# Patient Record
Sex: MALE | Race: BLACK OR AFRICAN AMERICAN | NOT HISPANIC OR LATINO | Employment: OTHER | URBAN - METROPOLITAN AREA
[De-identification: names, ages, dates, MRNs, and addresses within clinical notes are randomized per-mention and may not be internally consistent; named-entity substitution may affect disease eponyms.]

---

## 2019-07-31 ENCOUNTER — APPOINTMENT (INPATIENT)
Dept: RADIOLOGY | Facility: HOSPITAL | Age: 64
DRG: 064 | End: 2019-07-31
Payer: COMMERCIAL

## 2019-07-31 ENCOUNTER — APPOINTMENT (EMERGENCY)
Dept: RADIOLOGY | Facility: HOSPITAL | Age: 64
DRG: 064 | End: 2019-07-31
Payer: COMMERCIAL

## 2019-07-31 ENCOUNTER — HOSPITAL ENCOUNTER (INPATIENT)
Facility: HOSPITAL | Age: 64
LOS: 6 days | DRG: 064 | End: 2019-08-06
Attending: EMERGENCY MEDICINE | Admitting: ANESTHESIOLOGY
Payer: COMMERCIAL

## 2019-07-31 DIAGNOSIS — R77.8 ELEVATED TROPONIN: ICD-10-CM

## 2019-07-31 DIAGNOSIS — I16.1 HYPERTENSIVE EMERGENCY: ICD-10-CM

## 2019-07-31 DIAGNOSIS — N39.0 UTI (URINARY TRACT INFECTION): ICD-10-CM

## 2019-07-31 DIAGNOSIS — K59.00 CONSTIPATED: ICD-10-CM

## 2019-07-31 DIAGNOSIS — I63.81 LACUNAR INFARCT, ACUTE (HCC): ICD-10-CM

## 2019-07-31 DIAGNOSIS — I10 HYPERTENSION: ICD-10-CM

## 2019-07-31 DIAGNOSIS — N17.9 AKI (ACUTE KIDNEY INJURY) (HCC): ICD-10-CM

## 2019-07-31 DIAGNOSIS — K21.9 GERD (GASTROESOPHAGEAL REFLUX DISEASE): ICD-10-CM

## 2019-07-31 DIAGNOSIS — N18.9 CKD (CHRONIC KIDNEY DISEASE): ICD-10-CM

## 2019-07-31 DIAGNOSIS — K56.7 ILEUS (HCC): ICD-10-CM

## 2019-07-31 DIAGNOSIS — Z78.9 DEEP VEIN THROMBOSIS (DVT) PROPHYLAXIS PRESCRIBED AT DISCHARGE: ICD-10-CM

## 2019-07-31 DIAGNOSIS — R33.9 URINARY RETENTION: ICD-10-CM

## 2019-07-31 DIAGNOSIS — R52 PAIN: ICD-10-CM

## 2019-07-31 DIAGNOSIS — I63.9 CVA (CEREBRAL VASCULAR ACCIDENT) (HCC): Primary | ICD-10-CM

## 2019-07-31 PROBLEM — E78.5 HLD (HYPERLIPIDEMIA): Status: ACTIVE | Noted: 2019-07-31

## 2019-07-31 LAB
ABO GROUP BLD: NORMAL
ALBUMIN SERPL BCP-MCNC: 3.9 G/DL (ref 3.5–5)
ALP SERPL-CCNC: 79 U/L (ref 46–116)
ALT SERPL W P-5'-P-CCNC: 50 U/L (ref 12–78)
ANION GAP SERPL CALCULATED.3IONS-SCNC: 11 MMOL/L (ref 4–13)
APTT PPP: 29 SECONDS (ref 24–33)
AST SERPL W P-5'-P-CCNC: 29 U/L (ref 5–45)
BILIRUB DIRECT SERPL-MCNC: 0.2 MG/DL (ref 0–0.2)
BILIRUB SERPL-MCNC: 0.8 MG/DL (ref 0.2–1)
BLD GP AB SCN SERPL QL: NEGATIVE
BUN SERPL-MCNC: 13 MG/DL (ref 5–25)
CALCIUM SERPL-MCNC: 8.9 MG/DL (ref 8.3–10.1)
CHLORIDE SERPL-SCNC: 103 MMOL/L (ref 100–108)
CO2 SERPL-SCNC: 26 MMOL/L (ref 21–32)
CREAT SERPL-MCNC: 1.41 MG/DL (ref 0.6–1.3)
ERYTHROCYTE [DISTWIDTH] IN BLOOD BY AUTOMATED COUNT: 13.6 % (ref 11.6–15.1)
GFR SERPL CREATININE-BSD FRML MDRD: 61 ML/MIN/1.73SQ M
GLUCOSE SERPL-MCNC: 91 MG/DL (ref 65–140)
GLUCOSE SERPL-MCNC: 96 MG/DL (ref 65–140)
HCT VFR BLD AUTO: 50.2 % (ref 36.5–49.3)
HGB BLD-MCNC: 16.4 G/DL (ref 12–17)
INR PPP: 1.09 (ref 0.86–1.16)
MCH RBC QN AUTO: 30.8 PG (ref 26.8–34.3)
MCHC RBC AUTO-ENTMCNC: 32.7 G/DL (ref 31.4–37.4)
MCV RBC AUTO: 94 FL (ref 82–98)
PLATELET # BLD AUTO: 221 THOUSANDS/UL (ref 149–390)
PMV BLD AUTO: 12.1 FL (ref 8.9–12.7)
POTASSIUM SERPL-SCNC: 3.7 MMOL/L (ref 3.5–5.3)
PROT SERPL-MCNC: 7.8 G/DL (ref 6.4–8.2)
PROTHROMBIN TIME: 11.4 SECONDS (ref 9.4–11.7)
RBC # BLD AUTO: 5.33 MILLION/UL (ref 3.88–5.62)
RH BLD: POSITIVE
SODIUM SERPL-SCNC: 140 MMOL/L (ref 136–145)
SPECIMEN EXPIRATION DATE: NORMAL
TROPONIN I SERPL-MCNC: 0.3 NG/ML
TROPONIN I SERPL-MCNC: 0.32 NG/ML
WBC # BLD AUTO: 7.16 THOUSAND/UL (ref 4.31–10.16)

## 2019-07-31 PROCEDURE — 71045 X-RAY EXAM CHEST 1 VIEW: CPT

## 2019-07-31 PROCEDURE — 70496 CT ANGIOGRAPHY HEAD: CPT

## 2019-07-31 PROCEDURE — 99285 EMERGENCY DEPT VISIT HI MDM: CPT

## 2019-07-31 PROCEDURE — 96375 TX/PRO/DX INJ NEW DRUG ADDON: CPT

## 2019-07-31 PROCEDURE — 85610 PROTHROMBIN TIME: CPT | Performed by: EMERGENCY MEDICINE

## 2019-07-31 PROCEDURE — 80076 HEPATIC FUNCTION PANEL: CPT | Performed by: EMERGENCY MEDICINE

## 2019-07-31 PROCEDURE — 93005 ELECTROCARDIOGRAM TRACING: CPT

## 2019-07-31 PROCEDURE — 70551 MRI BRAIN STEM W/O DYE: CPT

## 2019-07-31 PROCEDURE — 70498 CT ANGIOGRAPHY NECK: CPT

## 2019-07-31 PROCEDURE — 85730 THROMBOPLASTIN TIME PARTIAL: CPT | Performed by: EMERGENCY MEDICINE

## 2019-07-31 PROCEDURE — 86850 RBC ANTIBODY SCREEN: CPT | Performed by: EMERGENCY MEDICINE

## 2019-07-31 PROCEDURE — 36415 COLL VENOUS BLD VENIPUNCTURE: CPT | Performed by: EMERGENCY MEDICINE

## 2019-07-31 PROCEDURE — 86900 BLOOD TYPING SEROLOGIC ABO: CPT | Performed by: EMERGENCY MEDICINE

## 2019-07-31 PROCEDURE — 84484 ASSAY OF TROPONIN QUANT: CPT | Performed by: EMERGENCY MEDICINE

## 2019-07-31 PROCEDURE — 99291 CRITICAL CARE FIRST HOUR: CPT | Performed by: ANESTHESIOLOGY

## 2019-07-31 PROCEDURE — 80048 BASIC METABOLIC PNL TOTAL CA: CPT | Performed by: EMERGENCY MEDICINE

## 2019-07-31 PROCEDURE — 85027 COMPLETE CBC AUTOMATED: CPT | Performed by: EMERGENCY MEDICINE

## 2019-07-31 PROCEDURE — 82948 REAGENT STRIP/BLOOD GLUCOSE: CPT

## 2019-07-31 PROCEDURE — 86901 BLOOD TYPING SEROLOGIC RH(D): CPT | Performed by: EMERGENCY MEDICINE

## 2019-07-31 PROCEDURE — 87081 CULTURE SCREEN ONLY: CPT | Performed by: FAMILY MEDICINE

## 2019-07-31 PROCEDURE — 96374 THER/PROPH/DIAG INJ IV PUSH: CPT

## 2019-07-31 RX ORDER — NEBIVOLOL 20 MG/1
20 TABLET ORAL DAILY
COMMUNITY

## 2019-07-31 RX ORDER — ASPIRIN 325 MG
325 TABLET ORAL ONCE
Status: COMPLETED | OUTPATIENT
Start: 2019-07-31 | End: 2019-07-31

## 2019-07-31 RX ORDER — ASPIRIN 81 MG/1
81 TABLET ORAL DAILY
COMMUNITY

## 2019-07-31 RX ORDER — ATORVASTATIN CALCIUM 80 MG/1
80 TABLET, FILM COATED ORAL
Status: DISCONTINUED | OUTPATIENT
Start: 2019-07-31 | End: 2019-08-06 | Stop reason: HOSPADM

## 2019-07-31 RX ORDER — ONDANSETRON 2 MG/ML
4 INJECTION INTRAMUSCULAR; INTRAVENOUS ONCE
Status: COMPLETED | OUTPATIENT
Start: 2019-07-31 | End: 2019-07-31

## 2019-07-31 RX ORDER — HYDRALAZINE HYDROCHLORIDE 20 MG/ML
10 INJECTION INTRAMUSCULAR; INTRAVENOUS ONCE
Status: COMPLETED | OUTPATIENT
Start: 2019-07-31 | End: 2019-07-31

## 2019-07-31 RX ORDER — SIMVASTATIN 10 MG
10 TABLET ORAL
COMMUNITY
End: 2019-08-06 | Stop reason: HOSPADM

## 2019-07-31 RX ORDER — MULTIVITAMIN
1 TABLET ORAL DAILY
COMMUNITY

## 2019-07-31 RX ORDER — AMLODIPINE AND OLMESARTAN MEDOXOMIL 10; 40 MG/1; MG/1
1 TABLET ORAL DAILY
COMMUNITY
End: 2019-08-06 | Stop reason: HOSPADM

## 2019-07-31 RX ORDER — LABETALOL 20 MG/4 ML (5 MG/ML) INTRAVENOUS SYRINGE
10 ONCE
Status: COMPLETED | OUTPATIENT
Start: 2019-07-31 | End: 2019-07-31

## 2019-07-31 RX ORDER — ASPIRIN 81 MG/1
81 TABLET ORAL DAILY
Status: DISCONTINUED | OUTPATIENT
Start: 2019-08-01 | End: 2019-08-06 | Stop reason: HOSPADM

## 2019-07-31 RX ORDER — SPIRONOLACTONE 25 MG/1
TABLET ORAL DAILY
COMMUNITY
End: 2019-08-06 | Stop reason: HOSPADM

## 2019-07-31 RX ORDER — NEBIVOLOL 10 MG/1
20 TABLET ORAL
Status: DISCONTINUED | OUTPATIENT
Start: 2019-07-31 | End: 2019-08-06 | Stop reason: HOSPADM

## 2019-07-31 RX ORDER — LOSARTAN POTASSIUM 50 MG/1
100 TABLET ORAL
Status: DISCONTINUED | OUTPATIENT
Start: 2019-07-31 | End: 2019-08-03

## 2019-07-31 RX ORDER — AMLODIPINE BESYLATE 10 MG/1
10 TABLET ORAL
Status: DISCONTINUED | OUTPATIENT
Start: 2019-07-31 | End: 2019-08-03

## 2019-07-31 RX ADMIN — ATORVASTATIN CALCIUM 80 MG: 80 TABLET ORAL at 18:59

## 2019-07-31 RX ADMIN — NEBIVOLOL HYDROCHLORIDE 20 MG: 10 TABLET ORAL at 21:24

## 2019-07-31 RX ADMIN — LABETALOL 20 MG/4 ML (5 MG/ML) INTRAVENOUS SYRINGE 10 MG: at 14:03

## 2019-07-31 RX ADMIN — AMLODIPINE BESYLATE 10 MG: 10 TABLET ORAL at 21:23

## 2019-07-31 RX ADMIN — SODIUM CHLORIDE 5 MG/HR: 0.9 INJECTION, SOLUTION INTRAVENOUS at 18:27

## 2019-07-31 RX ADMIN — LOSARTAN POTASSIUM 100 MG: 50 TABLET ORAL at 21:24

## 2019-07-31 RX ADMIN — IOHEXOL 85 ML: 350 INJECTION, SOLUTION INTRAVENOUS at 13:33

## 2019-07-31 RX ADMIN — ONDANSETRON 4 MG: 2 INJECTION INTRAMUSCULAR; INTRAVENOUS at 18:03

## 2019-07-31 RX ADMIN — ASPIRIN 325 MG: 325 TABLET, FILM COATED ORAL at 14:44

## 2019-07-31 RX ADMIN — HYDRALAZINE HYDROCHLORIDE 10 MG: 20 INJECTION INTRAMUSCULAR; INTRAVENOUS at 14:40

## 2019-07-31 RX ADMIN — HYDRALAZINE HYDROCHLORIDE 10 MG: 20 INJECTION INTRAMUSCULAR; INTRAVENOUS at 15:06

## 2019-07-31 NOTE — ASSESSMENT & PLAN NOTE
· No complaints of chest pain, EKG with LVH   Will need further work-up likely prior to discharge  · Echocardiogram ordered  · Cardiology consulted

## 2019-07-31 NOTE — QUICK NOTE
Stroke alert called: 116 PM  Neurology called back: 117 pm  Patient's last known normal: 5 pm yesterday refused to come to ED per family and then came today due to worsening symptoms  NIHSS per ED exam : at least a 5 with left UE And LE paresis, dysmetria    CT head not acute with remote right BG infarct noted  CTA H/N not acute- no LVO  IVtPA no- out of time window    Admit under stroke care protocol  Permissive HTN upto 180s /100-120s for 24 hours then gradually lower to 120-140 SBP   now PO or AR if fails swallow screen  MRI Brain routine- concern for acute ischemic brainstem infarct vs other subcortical ischemic infarction with vascular risk factors including untreated HTN  Lipitor 80 daily at least for a month then 40  ECHO

## 2019-07-31 NOTE — ED NOTES
Patient was just vomiting in room  Vomit yellow in color, thin consistency  Patient states feels better after vomiting        202 Stephanie Andino, RN  07/31/19 1800

## 2019-07-31 NOTE — ED PROVIDER NOTES
History  Chief Complaint   Patient presents with    Extremity Weakness     right sided weakness since last night at 160     43-year-old male with past medical history of hypertension, hyperlipidemia, presents to the ER with complaint of right upper arm and leg weakness that started around 5:00 p m  Last night  Patient refused to come to the ED last night  Weakness persistent patient is now having difficulty walking as a result  Patient came to the ER for further evaluation  Upon arrival to the ER stroke alert was activated as symptom onset is less than 24 hours  History provided by:  Patient and spouse      Prior to Admission Medications   Prescriptions Last Dose Informant Patient Reported? Taking? Multiple Vitamin (MULTIVITAMIN) tablet 7/30/2019 at Unknown time  Yes Yes   Sig: Take 1 tablet by mouth daily   amlodipine-olmesartan (TED) 10-40 MG 7/30/2019 at Unknown time  Yes Yes   Sig: Take 1 tablet by mouth daily   aspirin (ECOTRIN LOW STRENGTH) 81 mg EC tablet 7/30/2019 at Unknown time  Yes Yes   Sig: Take 81 mg by mouth daily   nebivolol (BYSTOLIC) 20 MG tablet 9/01/0985 at Unknown time  Yes Yes   Sig: Take 20 mg by mouth daily   simvastatin (ZOCOR) 10 mg tablet 7/30/2019 at Unknown time  Yes Yes   Sig: Take 10 mg by mouth daily at bedtime   spironolactone (ALDACTONE) 25 mg tablet 7/30/2019 at Unknown time  Yes Yes   Sig: Take by mouth daily      Facility-Administered Medications: None       Past Medical History:   Diagnosis Date    Hyperlipidemia     Hypertension        History reviewed  No pertinent surgical history  History reviewed  No pertinent family history  I have reviewed and agree with the history as documented      Social History     Tobacco Use    Smoking status: Never Smoker    Smokeless tobacco: Never Used   Substance Use Topics    Alcohol use: Never     Frequency: Never    Drug use: Never        Review of Systems   Constitutional: Negative for activity change, fatigue and fever    HENT: Negative for congestion, ear discharge and sore throat  Eyes: Negative for pain and redness  Respiratory: Negative for cough, chest tightness, shortness of breath and wheezing  Cardiovascular: Negative for chest pain  Gastrointestinal: Negative for abdominal pain, diarrhea, nausea and vomiting  Endocrine: Negative for cold intolerance  Genitourinary: Negative for dysuria and urgency  Musculoskeletal: Negative for arthralgias and back pain  Neurological: Positive for weakness  Negative for dizziness and headaches  Psychiatric/Behavioral: Negative for agitation and behavioral problems  Physical Exam  Physical Exam   Constitutional: He is oriented to person, place, and time  He appears well-developed and well-nourished  HENT:   Head: Normocephalic and atraumatic  Nose: Nose normal    Mouth/Throat: Oropharynx is clear and moist    Eyes: Conjunctivae and EOM are normal    Neck: Normal range of motion  Neck supple  Cardiovascular: Normal rate, regular rhythm and normal heart sounds  Pulmonary/Chest: Effort normal and breath sounds normal    Abdominal: Soft  Bowel sounds are normal  He exhibits no distension  There is no tenderness  Musculoskeletal: Normal range of motion  Neurological: He is alert and oriented to person, place, and time  Patient is alert and oriented x3  Visual field intact bilateral   Finger-to-nose is weak on the right side  Heel-to-shin is weak on the right side  Sensory and motor strength intact bilateral   No focal neuro deficits noted  Skin: Skin is warm  Psychiatric: He has a normal mood and affect  His behavior is normal  Judgment and thought content normal    Nursing note and vitals reviewed        Vital Signs  ED Triage Vitals   Temperature Pulse Respirations Blood Pressure SpO2   07/31/19 1312 07/31/19 1312 07/31/19 1312 07/31/19 1312 07/31/19 1312   (!) 97 °F (36 1 °C) 88 20 (!) 260/127 98 %      Temp Source Heart Rate Source Patient Position - Orthostatic VS BP Location FiO2 (%)   07/31/19 1451 07/31/19 1327 07/31/19 1327 07/31/19 1451 --   Temporal Monitor Lying Left arm       Pain Score       07/31/19 1312       No Pain           Vitals:    07/31/19 1515 07/31/19 1545 07/31/19 1600 07/31/19 1615   BP: (!) 181/99 (!) 211/105 (!) 194/102 (!) 222/110   Pulse: 68 66 64 66   Patient Position - Orthostatic VS: Sitting  Lying Lying         Visual Acuity  Visual Acuity      Most Recent Value   L Pupil Size (mm)  3   R Pupil Size (mm)  3          ED Medications  Medications   niCARdipine (CARDENE) 25 mg (STANDARD CONCENTRATION) in sodium chloride 0 9% 250 mL (has no administration in time range)   iohexol (OMNIPAQUE) 350 MG/ML injection (MULTI-DOSE) 85 mL (85 mL Intravenous Given 7/31/19 1333)   Labetalol HCl (NORMODYNE) injection 10 mg (10 mg Intravenous Given 7/31/19 1403)   aspirin tablet 325 mg (325 mg Oral Given 7/31/19 1444)   hydrALAZINE (APRESOLINE) injection 10 mg (10 mg Intravenous Given 7/31/19 1440)   hydrALAZINE (APRESOLINE) injection 10 mg (10 mg Intravenous Given 7/31/19 1506)       Diagnostic Studies  Results Reviewed     Procedure Component Value Units Date/Time    Troponin I [[id number]]  (Abnormal) Collected:  07/31/19 1557    Lab Status:  Final result Specimen:  Blood from Arm, Right Updated:  07/31/19 1624     Troponin I 0 30 ng/mL     Troponin I [[id number]]  (Abnormal) Collected:  07/31/19 1324    Lab Status:  Final result Specimen:  Blood from Arm, Right Updated:  07/31/19 1355     Troponin I 0 32 ng/mL     Basic metabolic panel [[id number]]  (Abnormal) Collected:  07/31/19 1324    Lab Status:  Final result Specimen:  Blood from Arm, Right Updated:  07/31/19 1354     Sodium 140 mmol/L      Potassium 3 7 mmol/L      Chloride 103 mmol/L      CO2 26 mmol/L      ANION GAP 11 mmol/L      BUN 13 mg/dL      Creatinine 1 41 mg/dL      Glucose 96 mg/dL      Calcium 8 9 mg/dL      eGFR 61 ml/min/1 73sq m     Narrative:       National Kid[employer] Disease Foundation guidelines for Chronic Kidney Disease (CKD):     Stage 1 with normal or high GFR (GFR > 90 mL/min/1 73 square meters)    Stage 2 Mild CKD (GFR = 60-89 mL/min/1 73 square meters)    Stage 3A Moderate CKD (GFR = 45-59 mL/min/1 73 square meters)    Stage 3B Moderate CKD (GFR = 30-44 mL/min/1 73 square meters)    Stage 4 Severe CKD (GFR = 15-29 mL/min/1 73 square meters)    Stage 5 End Stage CKD (GFR <15 mL/min/1 73 square meters)  Note: GFR calculation is accurate only with a steady state creatinine    Hepatic function panel [[id number]]  (Normal) Collected:  07/31/19 1324    Lab Status:  Final result Specimen:  Blood from Arm, Right Updated:  07/31/19 1354     Total Bilirubin 0 80 mg/dL      Bilirubin, Direct 0 20 mg/dL      Alkaline Phosphatase 79 U/L      AST 29 U/L      ALT 50 U/L      Total Protein 7 8 g/dL      Albumin 3 9 g/dL     Protime-INR [[id number]]  (Normal) Collected:  07/31/19 1324    Lab Status:  Final result Specimen:  Blood from Arm, Right Updated:  07/31/19 1346     Protime 11 4 seconds      INR 1 09    APTT [[id number]]  (Normal) Collected:  07/31/19 1324    Lab Status:  Final result Specimen:  Blood from Arm, Right Updated:  07/31/19 1346     PTT 29 seconds     CBC and Platelet [[id number]]  (Abnormal) Collected:  07/31/19 1324    Lab Status:  Final result Specimen:  Blood from Arm, Right Updated:  07/31/19 1335     WBC 7 16 Thousand/uL      RBC 5 33 Million/uL      Hemoglobin 16 4 g/dL      Hematocrit 50 2 %      MCV 94 fL      MCH 30 8 pg      MCHC 32 7 g/dL      RDW 13 6 %      Platelets 960 Thousands/uL      MPV 12 1 fL     Fingerstick Glucose (POCT) [[id number]]  (Normal) Collected:  07/31/19 1314    Lab Status:  Final result Updated:  07/31/19 1317     POC Glucose 91 mg/dl                  X-ray chest 1 view portable   Final Result by Osiris Wang MD (07/31 1557)      No acute cardiopulmonary disease              Workstation performed: [id number]         CTA stroke alert (head/neck)   Final Result by Sergio Torres MD (07/31 8830)         1  No hemodynamically significant stenosis in the major arteries of the neck  2   No intracranial aneurysm or major intracranial arterial stenosis  3   Frothy soft tissue abnormality in the nondependent portion of the trachea with imaging features favoring aspiration  Differential diagnosis could include tracheal polyp or other soft tissue lesion  4   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  Findings were directly discussed with Dr Honey Weinberg on 7/31/2019 1:32 PM                      Workstation performed: [id number]         CT stroke alert brain   Final Result by Sergio Torres MD (07/31 0586)      No acute intracranial hemorrhage, mass effect or edema  Microangiopathic changes  Chronic appearing right basal ganglia lacunar infarctions  Findings were directly discussed with Dr Honey Weinberg on 7/31/2019 1:32 PM       Workstation performed: [id number]         MRI brain wo contrast    (Results Pending)              Procedures  ECG 12 Lead Documentation Only  Date/Time: 7/31/2019 1:30 PM  Performed by: Bradley Lozoya DO  Authorized by: Bradley Lozoya DO     Indications / Diagnosis:  Stroke  ECG reviewed by me, the ED Provider: yes    Patient location:  ED  Previous ECG:     Previous ECG:  Unavailable    Comparison to cardiac monitor: Yes    Interpretation:     Interpretation: abnormal    Comments:      Sinus rhythm, rate 58, normal axis, normal intervals, no acute ST elevations noted, left ventricular hypertrophy noted, T-wave inversions noted to V4 to V6 as well as lead 1 and aVL that is concerning for lateral ischemia  No previous EKG available for comparison    ECG 12 Lead Documentation Only  Date/Time: 7/31/2019 3:50 PM  Performed by: Bradley Lozoya DO  Authorized by: Bradley Lozoya DO     Indications / Diagnosis:  Weakness  ECG reviewed by me, the ED Provider: yes Patient location:  ED  Previous ECG:     Previous ECG:  Compared to current    Similarity:  No change    Comparison to cardiac monitor: Yes    Interpretation:     Interpretation: abnormal    Comments:      Sinus rhythm, rate 63, normal axis, normal intervals, left ventricular hypertrophy, T-wave inversions noted to lead for through lead 6, lead 1, and aVL showing concern for lateral ischemia that is unchanged from earlier EKG  CriticalCare Time  Performed by: Dwayne Blancas DO  Authorized by: Dwayne Blancas DO     Critical care provider statement:     Critical care time (minutes):  125    Critical care was necessary to treat or prevent imminent or life-threatening deterioration of the following conditions:  CNS failure or compromise    Critical care was time spent personally by me on the following activities:  Blood draw for specimens, obtaining history from patient or surrogate, development of treatment plan with patient or surrogate, discussions with consultants, discussions with primary provider, evaluation of patient's response to treatment, examination of patient, review of old charts, re-evaluation of patient's condition, ordering and review of radiographic studies, ordering and review of laboratory studies, ordering and performing treatments and interventions and interpretation of cardiac output measurements  Comments:      Stroke           ED Course  ED Course as of Jul 31 1709   Wed Jul 31, 2019   1317 Case discussed with neurologist on call, Dr Johanny Avelar, who agrees that patient is not a tPA candidate however if there is any large occlusions then patient may be and endovascular candidate  Dr Johanny Avelar will follow up with CT results  0 Neurologist reviewed CT brain as well as CTA which were negative for any acute events  At this time it is recommended to admit patient for further stroke workup with MRI brain  Full-dose aspirin for now        1429 Case discussed with cardiologist,   Jacobo Reinoso, who agrees the patient has elevated troponin may be a component of CVA versus LASHA  Patient does have ischemic changes in his EKG however patient is chest pain-free  At this time will hold off on heparin and trend troponin  Heparin can be started if troponin continues to rise  1433 Case discussed with hospitalist, Dr Vicki Lira, who will admit patient once blood pressure is stabilized  24-20-52-61 Patient re-examined at bedside  Patient complains of worsening right upper and lower extremity weakness  Patient is also diaphoretic however denies any chest pain  Patient's blood pressure is elevated again at 211/105  At this time will obtain 2nd EKG and troponin  Will start Cardene drip  ICU team notify who will, evaluate patient at bedside  [id number] Patient examined by ICU team and at this time he will be accepted to ICU with Cardene drip  80 Discussed patient's progress with neurologist again who agrees with Thai santamaria and recommends MRI brain today to assess stroke burden  Called and spoke to MRI tech who will be able to scan patient today  1705 Case discussed with radiologist who notes acute to subacute lacunar infarct in the left corona radiata  ICU team notified of MRI findings                  Stroke Assessment     Row Name 07/31/19 1350             NIH Stroke Scale    Interval  Baseline      Level of Consciousness (1a )  0      LOC Questions (1b )  0      LOC Commands (1c )  0      Best Gaze (2 )  0      Visual (3 )  0      Facial Palsy (4 )  0      Motor Arm, Left (5a )  0      Motor Arm, Right (5b )  1      Motor Leg, Left (6a )  0      Motor Leg, Right (6b )  0      Limb Ataxia (7 )  2      Sensory (8 )  0      Best Language (9 )  0      Dysarthria (10 )  0      Extinction and Inattention (11 ) (Formerly Neglect)  0      Total  3                          MDM  Number of Diagnoses or Management Options  LASHA (acute kidney injury) [facility]): new and requires workup  CVA (cerebral vascular accident) [facility]): new and requires workup  Elevated troponin: new and requires workup  Hypertension: new and requires workup  Diagnosis management comments: Stroke alert activated, blood work and CT brain, CTA head/neck ordered  Continue to monitor patient closely  Patient is hypertensive in the ED  Give labetalol and monitor BP  Amount and/or Complexity of Data Reviewed  Clinical lab tests: ordered and reviewed  Tests in the radiology section of CPT®: ordered and reviewed  Tests in the medicine section of CPT®: ordered and reviewed  Review and summarize past medical records: yes  Independent visualization of images, tracings, or specimens: yes    Risk of Complications, Morbidity, and/or Mortality  General comments: Patient's CT brain and CTA head/neck did not show any acute intracranial events  At this time patient was admitted for stroke workup with follow-up MRI brain  Patient's blood pressure was persistently elevated despite multiple doses of hydralazine and labetalol  Patient also complained of worsening right upper and lower extremity weakness throughout his ED stay  At this time patient's symptoms may be a component of uncontrolled hypertensive emergency as well as CVA symptoms  Patient was started on Cardene drip for better blood pressure control and evaluated by ICU team   At this time patient's admission was upgraded from telemetry to ICU for further monitoring  Patient went to MRI prior to going to ICU  MRI shows acute to subacute lacunar infarct in the left corona radiata  ICU team notified of these findings      Patient Progress  Patient progress: stable      Disposition  Final diagnoses:   CVA (cerebral vascular accident) ([address] )   Hypertension   LASHA (acute kidney injury) ([facility] [address] )   Elevated troponin     Time reflects when diagnosis was documented in both MDM as applicable and the Disposition within this note     Time User Action Codes Description Comment    7/31/2019  3:30 PM Stuart Richards Add [I63 9] CVA (cerebral vascular accident) ([facility] [address] )     7/31/2019  3:30 PM SoniyaAshleighone Add [I10] Hypertension     7/31/2019  3:30 PM Crista DennisonAshleigh Add [N17 9] LASHA (acute kidney injury) ([facility] [address] )     7/31/2019  3:31 PM Stuart Richards Add [R74 8] Elevated troponin       ED Disposition     ED Disposition Condition Date/Time Comment    Admit Stable Wed Jul 31, 2019  3:31 PM Case was discussed with Dr Sebastien Estrella and the patient's admission status was agreed to be Admission Status: inpatient status to the service of Dr Sebastien Estrella  Follow-up Information    None         Patient's Medications   Discharge Prescriptions    No medications on file     No discharge procedures on file      ED Provider  Electronically Signed by           Sagrario Mcgee, DO  07/31/19 409 [facility], DO  07/31/19 9930

## 2019-07-31 NOTE — H&P
History and Physical - Critical Care/ Stepdown   Silas Williamson 60 y o  male MRN: [id number]  Unit/Bed#: ED 04 Encounter: [id number]    Reason for Admission / Chief Complaint: stroke    History of Present Illness:  Silas Williamson is a 60 y o  male his reported history of hypertension hyperlipidemia who presents to the emergency department with right-sided weakness  Wife states that this began to start around 17:00 yesterday however the patient refused to come to the emergency department  The weakness worsened the patient was unable to ambulate and, prompting the wife to bring him to the emergency department for further evaluation  In the emergency department, given his late presentation, he was not a candidate for tPA  CT of his head did not show any acute bleed  Patient was hypertensive in the emergency department was given labetalol as well as hydralazine with minimal fact  His extremity weakness was reportedly getting worse while in the emergency department depending on his blood pressure  Given this, the patient was started on cardene and will be admitted to ICU  History obtained from chart review and the patient  Past Medical History:  Past Medical History:   Diagnosis Date    Hyperlipidemia     Hypertension        Past Surgical History:  History reviewed  No pertinent surgical history  Past Family History:  History reviewed  No pertinent family history      Social History:  Social History     Tobacco Use   Smoking Status Never Smoker   Smokeless Tobacco Never Used      Social History     Substance and Sexual Activity   Alcohol Use Never    Frequency: Never     Social History     Substance and Sexual Activity   Drug Use Never     Marital Status: /Civil Union  Exercise History:     Medications:  Current Facility-Administered Medications   Medication Dose Route Frequency    [START ON 8/1/2019] aspirin (ECOTRIN LOW STRENGTH) EC tablet 81 mg  81 mg Oral Daily    atorvastatin (LIPITOR) tablet 80 mg  80 mg Oral Daily With Dinner    [START ON 2019] enoxaparin (LOVENOX) subcutaneous injection 40 mg  40 mg Subcutaneous Daily    niCARdipine (CARDENE) 25 mg (STANDARD CONCENTRATION) in sodium chloride 0 9% 250 mL  1-15 mg/hr Intravenous Titrated     Home medications:  Prior to Admission medications    Medication Sig Start Date End Date Taking? Authorizing Provider   amlodipine-olmesartan (TED) 10-40 MG Take 1 tablet by mouth daily   Yes Historical Provider, MD   aspirin (ECOTRIN LOW STRENGTH) 81 mg EC tablet Take 81 mg by mouth daily   Yes Historical Provider, MD   Multiple Vitamin (MULTIVITAMIN) tablet Take 1 tablet by mouth daily   Yes Historical Provider, MD   nebivolol (BYSTOLIC) 20 MG tablet Take 20 mg by mouth daily   Yes Historical Provider, MD   simvastatin (ZOCOR) 10 mg tablet Take 10 mg by mouth daily at bedtime   Yes Historical Provider, MD   spironolactone (ALDACTONE) 25 mg tablet Take by mouth daily   Yes Historical Provider, MD     Allergies:  No Known Allergies    ROS:   Review of Systems   Endocrine: Positive for heat intolerance  Neurological: Positive for speech difficulty and weakness  All other systems reviewed and are negative  Vitals:  Vitals:    19 1645 19 1700 19 1708 19 1712   BP: (!) 176/114 (!) 175/111 (!) 210/97 (!) 172/99   BP Location: Right arm Left arm Left arm Right arm   Pulse: 72 73 72    Resp: 18 18 17    Temp:       TempSrc:       SpO2: 100% 98% 99%    Weight:         Temperature:   Temp (24hrs), Av 5 °F (36 4 °C), Min:97 °F (36 1 °C), Max:98 °F (36 7 °C)    Current Temperature: 98 °F (36 7 °C)    Weights:   IBW: -88 kg  There is no height or weight on file to calculate BMI      Hemodynamic Monitoring:  N/A     Non-Invasive/Invasive Ventilation Settings:  Respiratory    Lab Data (Last 4 hours)    None         O2/Vent Data (Last 4 hours)    None              No results found for: PHART, PSY5OUZ, PO2ART, ZZB9COA, S2UGNLJY, BEART, SOURCE  SpO2: SpO2: 97 %, SpO2 Activity: SpO2 Activity: At Rest, SpO2 Device: O2 Device: None (Room air), Capnography:       Physical Exam:  Physical Exam   Constitutional: He is oriented to person, place, and time  He appears well-developed and well-nourished  No distress  HENT:   Head: Normocephalic and atraumatic  Eyes: Pupils are equal, round, and reactive to light  Neck: Neck supple  Cardiovascular: Normal rate and regular rhythm  No murmur heard  Pulmonary/Chest: Effort normal and breath sounds normal  No respiratory distress  Abdominal: Soft  Bowel sounds are normal  He exhibits no distension  Musculoskeletal: He exhibits no edema  Neurological: He is alert and oriented to person, place, and time  GCS eye subscore is 4  GCS verbal subscore is 5  GCS motor subscore is 6    0/5 strength of right side, 5/5 of left side  Sensation intact  Right sided facial droop  Speech garbled but not confused   Nursing note and vitals reviewed  Labs:  Results from last 7 days   Lab Units 07/31/19  1324   WBC Thousand/uL 7 16   HEMOGLOBIN g/dL 16 4   HEMATOCRIT % 50 2*   PLATELETS Thousands/uL 221      Results from last 7 days   Lab Units 07/31/19  1324   SODIUM mmol/L 140   POTASSIUM mmol/L 3 7   CHLORIDE mmol/L 103   CO2 mmol/L 26   BUN mg/dL 13   CREATININE mg/dL 1 41*   CALCIUM mg/dL 8 9   ALK PHOS U/L 79   ALT U/L 50   AST U/L 29              Results from last 7 days   Lab Units 07/31/19  1324   INR  1 09   PTT seconds 29         0   Lab Value Date/Time    TROPONINI 0 30 (H) 07/31/2019 1557    TROPONINI 0 32 (H) 07/31/2019 1324       Imaging:   CT:  I have personally reviewed pertinent reports  and I have personally reviewed pertinent films in PACS  EKG: This was personally reviewed by myself       Micro:  Blood Culture: No results found for: BLOODCX  Urine Culture: No results found for: URINECX  Sputum Culture: No components found for: SPUTUMCX  Wound Culure: No results found for: WOUNDCULT  ______________________________________________________________________    Assessment:   Principal Problem:    Lacunar infarct, acute ([address] )  Active Problems:    Hypertensive emergency    Elevated troponin    HLD (hyperlipidemia)  Resolved Problems:    * No resolved hospital problems  *      * Lacunar infarct, acute ([address] )  Assessment & Plan  · Patient with delayed presentation to ER after stroke symptoms, not a candidate for tPA at that time  · CT scan without evidence of bleeding  · Permissive HTN to 617 systolic  · Stroke work-up  · Echocardiogram  · Lipids, TSH, HbA1c  · Begin high dose statin  · Neurology consult  · MRI    Hypertensive emergency  Assessment & Plan  · Likely chronically uncontrolled, now with end organ damage  · Continue cardene for goal SBP <180  · Restart home antihypertensives    Elevated troponin  Assessment & Plan  · Continue to trend  · No complaints of chest pain, EKG with T wave inversions  Will need further work-up likely  · Echocardiogram ordered    HLD (hyperlipidemia)  Assessment & Plan  · High dose statin  · Lipid panel      Disposition: Admit to ICU    Counseling / Coordination of Care  Total Critical Care time spent 43 minutes excluding procedures, teaching and family updates  ______________________________________________________________________    VTE Pharmacologic Prophylaxis: Enoxaparin (Lovenox)  VTE Mechanical Prophylaxis: sequential compression device    Invasive lines and devices: Invasive Devices     Peripheral Intravenous Line            Peripheral IV 07/31/19 Right Antecubital less than 1 day                Code Status: Level 1 - Full Code  POA:    POLST:      Given critical illness, patient length of stay will require greater than two midnights      Signature: Dionisio Santiago PA-C  Date: 7/31/2019

## 2019-07-31 NOTE — ASSESSMENT & PLAN NOTE
· Patient with delayed presentation to ER after stroke symptoms, not a candidate for tPA at that time  · CT scan without evidence of bleeding  · Blood pressure improved, off of Nicardipine  Continue home medications, may need additional agents  · Stroke work-up  · Echocardiogram pending  · Lipids, TSH, HbA1c  · Continue high dose statin  · Neurology consult  · MRI with acute appearing lacunar infarct at the posterior left corona radiata  ? possibility of demyelinating disease  Old right basal ganglia lacunar infarct

## 2019-07-31 NOTE — ED NOTES
Patient c/o numbness right face  States is unable to move right arm or leg  Dr Amy Cantor is aware       Arian Hernandez RN  07/31/19 6959

## 2019-08-01 ENCOUNTER — APPOINTMENT (INPATIENT)
Dept: NON INVASIVE DIAGNOSTICS | Facility: HOSPITAL | Age: 64
DRG: 064 | End: 2019-08-01
Payer: COMMERCIAL

## 2019-08-01 LAB
ANION GAP SERPL CALCULATED.3IONS-SCNC: 11 MMOL/L (ref 4–13)
ATRIAL RATE: 58 BPM
ATRIAL RATE: 63 BPM
BASOPHILS # BLD AUTO: 0.03 THOUSANDS/ΜL (ref 0–0.1)
BASOPHILS NFR BLD AUTO: 0 % (ref 0–1)
BUN SERPL-MCNC: 13 MG/DL (ref 5–25)
CALCIUM SERPL-MCNC: 9 MG/DL (ref 8.3–10.1)
CHLORIDE SERPL-SCNC: 103 MMOL/L (ref 100–108)
CHOLEST SERPL-MCNC: 193 MG/DL (ref 50–200)
CO2 SERPL-SCNC: 26 MMOL/L (ref 21–32)
CREAT SERPL-MCNC: 1.43 MG/DL (ref 0.6–1.3)
EOSINOPHIL # BLD AUTO: 0.06 THOUSAND/ΜL (ref 0–0.61)
EOSINOPHIL NFR BLD AUTO: 1 % (ref 0–6)
ERYTHROCYTE [DISTWIDTH] IN BLOOD BY AUTOMATED COUNT: 13.7 % (ref 11.6–15.1)
EST. AVERAGE GLUCOSE BLD GHB EST-MCNC: 120 MG/DL
GFR SERPL CREATININE-BSD FRML MDRD: 60 ML/MIN/1.73SQ M
GLUCOSE SERPL-MCNC: 96 MG/DL (ref 65–140)
HBA1C MFR BLD: 5.8 % (ref 4.2–6.3)
HCT VFR BLD AUTO: 48.6 % (ref 36.5–49.3)
HDLC SERPL-MCNC: 44 MG/DL (ref 40–60)
HGB BLD-MCNC: 16.3 G/DL (ref 12–17)
IMM GRANULOCYTES # BLD AUTO: 0.04 THOUSAND/UL (ref 0–0.2)
IMM GRANULOCYTES NFR BLD AUTO: 0 % (ref 0–2)
LDLC SERPL CALC-MCNC: 130 MG/DL (ref 0–100)
LYMPHOCYTES # BLD AUTO: 2.73 THOUSANDS/ΜL (ref 0.6–4.47)
LYMPHOCYTES NFR BLD AUTO: 31 % (ref 14–44)
MAGNESIUM SERPL-MCNC: 2.1 MG/DL (ref 1.6–2.6)
MCH RBC QN AUTO: 31.2 PG (ref 26.8–34.3)
MCHC RBC AUTO-ENTMCNC: 33.5 G/DL (ref 31.4–37.4)
MCV RBC AUTO: 93 FL (ref 82–98)
MONOCYTES # BLD AUTO: 0.75 THOUSAND/ΜL (ref 0.17–1.22)
MONOCYTES NFR BLD AUTO: 8 % (ref 4–12)
NEUTROPHILS # BLD AUTO: 5.35 THOUSANDS/ΜL (ref 1.85–7.62)
NEUTS SEG NFR BLD AUTO: 60 % (ref 43–75)
NONHDLC SERPL-MCNC: 149 MG/DL
NRBC BLD AUTO-RTO: 0 /100 WBCS
P AXIS: 27 DEGREES
P AXIS: 42 DEGREES
PHOSPHATE SERPL-MCNC: 3.5 MG/DL (ref 2.3–4.1)
PLATELET # BLD AUTO: 229 THOUSANDS/UL (ref 149–390)
PMV BLD AUTO: 11.6 FL (ref 8.9–12.7)
POTASSIUM SERPL-SCNC: 3.7 MMOL/L (ref 3.5–5.3)
PR INTERVAL: 180 MS
PR INTERVAL: 196 MS
QRS AXIS: -10 DEGREES
QRS AXIS: -4 DEGREES
QRSD INTERVAL: 100 MS
QRSD INTERVAL: 100 MS
QT INTERVAL: 458 MS
QT INTERVAL: 478 MS
QTC INTERVAL: 468 MS
QTC INTERVAL: 469 MS
RBC # BLD AUTO: 5.22 MILLION/UL (ref 3.88–5.62)
SODIUM SERPL-SCNC: 140 MMOL/L (ref 136–145)
T WAVE AXIS: 146 DEGREES
T WAVE AXIS: 153 DEGREES
TRIGL SERPL-MCNC: 97 MG/DL
TSH SERPL DL<=0.05 MIU/L-ACNC: 1.24 UIU/ML (ref 0.36–3.74)
VENTRICULAR RATE: 58 BPM
VENTRICULAR RATE: 63 BPM
WBC # BLD AUTO: 8.96 THOUSAND/UL (ref 4.31–10.16)

## 2019-08-01 PROCEDURE — 99254 IP/OBS CNSLTJ NEW/EST MOD 60: CPT | Performed by: INTERNAL MEDICINE

## 2019-08-01 PROCEDURE — 83735 ASSAY OF MAGNESIUM: CPT | Performed by: PHYSICIAN ASSISTANT

## 2019-08-01 PROCEDURE — 99232 SBSQ HOSP IP/OBS MODERATE 35: CPT | Performed by: ANESTHESIOLOGY

## 2019-08-01 PROCEDURE — 84443 ASSAY THYROID STIM HORMONE: CPT | Performed by: PHYSICIAN ASSISTANT

## 2019-08-01 PROCEDURE — 93010 ELECTROCARDIOGRAM REPORT: CPT | Performed by: INTERNAL MEDICINE

## 2019-08-01 PROCEDURE — 80048 BASIC METABOLIC PNL TOTAL CA: CPT | Performed by: PHYSICIAN ASSISTANT

## 2019-08-01 PROCEDURE — 97116 GAIT TRAINING THERAPY: CPT

## 2019-08-01 PROCEDURE — 97163 PT EVAL HIGH COMPLEX 45 MIN: CPT

## 2019-08-01 PROCEDURE — 97167 OT EVAL HIGH COMPLEX 60 MIN: CPT

## 2019-08-01 PROCEDURE — 84100 ASSAY OF PHOSPHORUS: CPT | Performed by: PHYSICIAN ASSISTANT

## 2019-08-01 PROCEDURE — 99255 IP/OBS CONSLTJ NEW/EST HI 80: CPT | Performed by: PSYCHIATRY & NEUROLOGY

## 2019-08-01 PROCEDURE — G8988 SELF CARE GOAL STATUS: HCPCS

## 2019-08-01 PROCEDURE — G8998 SWALLOW D/C STATUS: HCPCS

## 2019-08-01 PROCEDURE — 85025 COMPLETE CBC W/AUTO DIFF WBC: CPT | Performed by: PHYSICIAN ASSISTANT

## 2019-08-01 PROCEDURE — 93306 TTE W/DOPPLER COMPLETE: CPT

## 2019-08-01 PROCEDURE — 80061 LIPID PANEL: CPT | Performed by: PHYSICIAN ASSISTANT

## 2019-08-01 PROCEDURE — 83036 HEMOGLOBIN GLYCOSYLATED A1C: CPT | Performed by: PHYSICIAN ASSISTANT

## 2019-08-01 PROCEDURE — NC001 PR NO CHARGE: Performed by: PHYSICIAN ASSISTANT

## 2019-08-01 PROCEDURE — 93306 TTE W/DOPPLER COMPLETE: CPT | Performed by: INTERNAL MEDICINE

## 2019-08-01 PROCEDURE — G8979 MOBILITY GOAL STATUS: HCPCS

## 2019-08-01 PROCEDURE — 97110 THERAPEUTIC EXERCISES: CPT

## 2019-08-01 PROCEDURE — G8978 MOBILITY CURRENT STATUS: HCPCS

## 2019-08-01 PROCEDURE — G8996 SWALLOW CURRENT STATUS: HCPCS

## 2019-08-01 PROCEDURE — G8987 SELF CARE CURRENT STATUS: HCPCS

## 2019-08-01 PROCEDURE — 92610 EVALUATE SWALLOWING FUNCTION: CPT

## 2019-08-01 PROCEDURE — G8997 SWALLOW GOAL STATUS: HCPCS

## 2019-08-01 RX ORDER — HYDRALAZINE HYDROCHLORIDE 25 MG/1
25 TABLET, FILM COATED ORAL EVERY 8 HOURS SCHEDULED
Status: DISCONTINUED | OUTPATIENT
Start: 2019-08-01 | End: 2019-08-03

## 2019-08-01 RX ORDER — HYDRALAZINE HYDROCHLORIDE 20 MG/ML
10 INJECTION INTRAMUSCULAR; INTRAVENOUS EVERY 4 HOURS PRN
Status: DISCONTINUED | OUTPATIENT
Start: 2019-08-01 | End: 2019-08-06 | Stop reason: HOSPADM

## 2019-08-01 RX ORDER — HYDROMORPHONE HCL/PF 1 MG/ML
0.2 SYRINGE (ML) INJECTION ONCE
Status: DISCONTINUED | OUTPATIENT
Start: 2019-08-02 | End: 2019-08-02

## 2019-08-01 RX ORDER — CLOPIDOGREL BISULFATE 75 MG/1
75 TABLET ORAL DAILY
Status: DISCONTINUED | OUTPATIENT
Start: 2019-08-02 | End: 2019-08-06 | Stop reason: HOSPADM

## 2019-08-01 RX ORDER — CLOPIDOGREL BISULFATE 75 MG/1
300 TABLET ORAL ONCE
Status: COMPLETED | OUTPATIENT
Start: 2019-08-01 | End: 2019-08-01

## 2019-08-01 RX ORDER — HYDRALAZINE HYDROCHLORIDE 20 MG/ML
10 INJECTION INTRAMUSCULAR; INTRAVENOUS ONCE
Status: COMPLETED | OUTPATIENT
Start: 2019-08-01 | End: 2019-08-01

## 2019-08-01 RX ORDER — CLONIDINE HYDROCHLORIDE 0.1 MG/1
0.2 TABLET ORAL 3 TIMES DAILY PRN
Status: DISCONTINUED | OUTPATIENT
Start: 2019-08-01 | End: 2019-08-01

## 2019-08-01 RX ADMIN — LOSARTAN POTASSIUM 100 MG: 50 TABLET ORAL at 21:05

## 2019-08-01 RX ADMIN — AMLODIPINE BESYLATE 10 MG: 10 TABLET ORAL at 21:05

## 2019-08-01 RX ADMIN — ASPIRIN 81 MG: 81 TABLET, COATED ORAL at 08:21

## 2019-08-01 RX ADMIN — ATORVASTATIN CALCIUM 80 MG: 80 TABLET ORAL at 15:53

## 2019-08-01 RX ADMIN — HYDRALAZINE HYDROCHLORIDE 25 MG: 25 TABLET ORAL at 14:25

## 2019-08-01 RX ADMIN — ENOXAPARIN SODIUM 40 MG: 40 INJECTION SUBCUTANEOUS at 08:21

## 2019-08-01 RX ADMIN — NEBIVOLOL HYDROCHLORIDE 20 MG: 10 TABLET ORAL at 21:03

## 2019-08-01 RX ADMIN — CLOPIDOGREL BISULFATE 300 MG: 75 TABLET ORAL at 19:12

## 2019-08-01 RX ADMIN — HYDRALAZINE HYDROCHLORIDE 10 MG: 20 INJECTION INTRAMUSCULAR; INTRAVENOUS at 08:26

## 2019-08-01 RX ADMIN — HYDRALAZINE HYDROCHLORIDE 10 MG: 20 INJECTION INTRAMUSCULAR; INTRAVENOUS at 17:41

## 2019-08-01 RX ADMIN — HYDRALAZINE HYDROCHLORIDE 25 MG: 25 TABLET ORAL at 09:56

## 2019-08-01 RX ADMIN — HYDRALAZINE HYDROCHLORIDE 25 MG: 25 TABLET ORAL at 21:04

## 2019-08-01 NOTE — UTILIZATION REVIEW
Initial Clinical Review    Admission: Date/Time/Statement: 7/31/19 @ 1531     Orders Placed This Encounter   Procedures    Inpatient Admission     Standing Status:   Standing     Number of Occurrences:   1     Order Specific Question:   Admitting Physician     Answer:   Roshan Fernandez [[id number]]     Order Specific Question:   Level of Care     Answer:   Critical Care [15]     Order Specific Question:   Estimated length of stay     Answer:   More than 2 Midnights     Order Specific Question:   Certification     Answer:   I certify that inpatient services are medically necessary for this patient for a duration of greater than two midnights  See H&P and MD Progress Notes for additional information about the patient's course of treatment  ED Arrival Information     Expected Arrival Acuity Means of Arrival Escorted By Service Admission Type    - 7/31/2019 13:05 Emergent Wheelchair Spouse Critical Care/ICU Emergency    Arrival Complaint    right side weakness        Chief Complaint   Patient presents with    Extremity Weakness     right sided weakness since last night at 1730     Assessment/Plan:   61 YO MALE TO ER VIA W/C BY FAMILY FOR R SIDED WEAKNESS STARTED LAST NIGHT, HOWEVER REFUSED TO COME TO ER, NOW WITH DIFFICULTY WALKING  HX HTN, HLD  PRESENTS HYPERTENSIVE WITH R SIDED WEAKNESS, WORSENING WHILE IN ER TO FLACCID  ADMISSION WORK-UP SHOWING +INFARCT  ADMITTED TO INPATIENT STATUS FOR LACUNAR INFARCT, STARTED IV CARDENE GTT FOR PERSISTENT HTN, NEURO CHECKS, NEURO CONSULTED     ED Triage Vitals   Temperature Pulse Respirations Blood Pressure SpO2   07/31/19 1312 07/31/19 1312 07/31/19 1312 07/31/19 1312 07/31/19 1312   (!) 97 °F (36 1 °C) 88 20 (!) 260/127 98 %      Temp Source Heart Rate Source Patient Position - Orthostatic VS BP Location FiO2 (%)   07/31/19 1451 07/31/19 1327 07/31/19 1327 07/31/19 1451 --   Temporal Monitor Lying Left arm       Pain Score       07/31/19 1312       No Pain        Wt Readings from Last 1 Encounters:   08/01/19 95 8 kg (211 lb 3 2 oz)     Additional Vital Signs:   07/31/19 1342    54Abnormal   18  234/110Abnormal     97 %    Sitting   07/31/19 13:40:08    56  22      96 %       07/31/19 13:38:44        251/114Abnormal            07/31/19 13:35:08    56  20             07/31/19 1330              None (Room air)     07/31/19 1327    59  18  258/115Abnormal     99 %    Lying   07/31/19 1312  97 °F (36 1 °C)Abnormal   88  20  260/127Abnormal     98 %       Pertinent Labs/Diagnostic Test Results:   Results from last 7 days   Lab Units 07/31/19  1324   WBC Thousand/uL 7 16   HEMOGLOBIN g/dL 16 4   HEMATOCRIT % 50 2*   PLATELETS Thousands/uL 221   NEUTROS ABS Thousands/µL  --      Results from last 7 days   Lab Units 07/31/19  1324   SODIUM mmol/L 140   POTASSIUM mmol/L 3 7   CHLORIDE mmol/L 103   CO2 mmol/L 26   ANION GAP mmol/L 11   BUN mg/dL 13   CREATININE mg/dL 1 41*   EGFR ml/min/1 73sq m 61   CALCIUM mg/dL 8 9   MAGNESIUM mg/dL  --    PHOSPHORUS mg/dL  --      Results from last 7 days   Lab Units 07/31/19  1324   AST U/L 29   ALT U/L 50   ALK PHOS U/L 79   TOTAL PROTEIN g/dL 7 8   ALBUMIN g/dL 3 9   TOTAL BILIRUBIN mg/dL 0 80   BILIRUBIN DIRECT mg/dL 0 20     Results from last 7 days   Lab Units 07/31/19  1314   POC GLUCOSE mg/dl 91     Results from last 7 days   Lab Units 07/31/19  1324   GLUCOSE RANDOM mg/dL 96     Results from last 7 days   Lab Units 07/31/19  1557 07/31/19  1324   TROPONIN I ng/mL 0 30* 0 32*     Results from last 7 days   Lab Units 07/31/19  1324   PROTIME seconds 11 4   INR  1 09   PTT seconds 29     CT BRAIN=No acute intracranial hemorrhage, mass effect or edema  Microangiopathic changes  Chronic appearing right basal ganglia lacunar infarctions  CTA HEAD & NECK=1  No hemodynamically significant stenosis in the major arteries of the neck  2   No intracranial aneurysm or major intracranial arterial stenosis    3   Frothy soft tissue abnormality in the nondependent portion of the trachea with imaging features favoring aspiration  Differential diagnosis could include tracheal polyp or other soft tissue lesion  4   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  CXR=No acute cardiopulmonary disease  MRI BRAIN=Acute appearing lacunar infarct at the posterior left corona radiata  Moderate periventricular and subcortical foci of white matter T2 hyperintensity which is nonspecific and most likely related to chronic small vessel ischemic changes  A few of these white matter lesions when perpendicular to the callosal septal   interface raising the possibility of demyelinating disease  Other less likely etiologies include vasculitis, Lyme and migraines  Old right basal ganglia lacunar infarct  EKG=Sinus rhythm, rate 58, normal axis, normal intervals, no acute ST elevations noted, left ventricular hypertrophy noted, T-wave inversions noted to V4 to V6 as well as lead 1 and aVL that is concerning for lateral ischemia  No previous EKG available for comparison    ED Treatment:   Medication Administration from 07/31/2019 1305 to 07/31/2019 1825       Date/Time Order Dose Route Action     07/31/2019 1333 iohexol (OMNIPAQUE) 350 MG/ML injection (MULTI-DOSE) 85 mL 85 mL Intravenous Given     07/31/2019 1403 Labetalol HCl (NORMODYNE) injection 10 mg 10 mg Intravenous Given     07/31/2019 1444 aspirin tablet 325 mg 325 mg Oral Given     07/31/2019 1440 hydrALAZINE (APRESOLINE) injection 10 mg 10 mg Intravenous Given     07/31/2019 1506 hydrALAZINE (APRESOLINE) injection 10 mg 10 mg Intravenous Given     07/31/2019 1803 ondansetron (ZOFRAN) injection 4 mg 4 mg Intravenous Given        Past Medical History:   Diagnosis Date    Hyperlipidemia     Hypertension      Admitting Diagnosis: Weakness generalized [R53 1]  Hypertension [I10]  CVA (cerebral vascular accident) ([facility] [address] ) [I63 9]  Elevated troponin [R74 8]  LASHA (acute kidney injury) [address] [address] ) [N17 9]  Age/Sex: 60 y o  male  Admission Orders:  ICU  FALL PRECAUTIONS  PT/OT/ST EVAL & TX  NURSING DYSPHAGIA ASSESSMENT  NEURO CHECKS Q2H  VENODYNES  NPO  L IJ CVC CARE  CONSULT NEURO  O2 TO KEEP SATS>92%  Current Facility-Administered Medications:  amLODIPine 10 mg Oral HS   aspirin 81 mg Oral Daily   atorvastatin 80 mg Oral Daily With Dinner   enoxaparin 40 mg Subcutaneous Daily   losartan 100 mg Oral HS   nebivolol 20 mg Oral HS   niCARdipine 1-15 mg/hr Intravenous Titrated     Network Utilization Review Department  Phone: [phone]; Fax [phone]  [email or web address]  ATTENTION: Please call with any questions or concerns to [phone]  and carefully listen to the prompts so that you are directed to the right person  Send all requests for admission clinical reviews, approved or denied determinations and any other requests to fax [phone]   All voicemails are confidential

## 2019-08-01 NOTE — SOCIAL WORK
SW following to assist with DCP  Acute rehab placement is being considered by pt and wife  Referral was made to [address] [place] [address]  Response received that [address] is out of network and pt does not have out of network benefits  However per [facility] admissions the  said that [facility] in [place] is in network  SW spoke with pt's wife to discuss above  Wife agreeable with referral being made to [facility] for acute rehab consideration  Referral has been made  SW will continue to follow to monitor progress and assist as needed

## 2019-08-01 NOTE — SPEECH THERAPY NOTE
Speech-Language Pathology Bedside Swallow Evaluation        Patient Name: Jamie Davis    BHSJW'K Date: 8/1/2019     Problem List  Patient Active Problem List   Diagnosis    Lacunar infarct, acute ([address] )    Hypertensive emergency    Elevated troponin    HLD (hyperlipidemia)       Past Medical History  Past Medical History:   Diagnosis Date    Hyperlipidemia     Hypertension        Past Surgical History  History reviewed  No pertinent surgical history  Summary    Oral and pharyngeal stages of swallowing appeared WNL  No risk for aspiration identified  Recommendations:   Diet: regular diet and thin liquids   Meds: whole with liquid   Frequent Oral care: 2x/day  Other Recommendations/ considerations: No follow up tx needed  Current Medical Status  Pt is a 60 y o  male who presented to [address] [place] [address] w/ lacunar infarct  Pt presents to the emergency department with right-sided weakness  Wife states that this began to start around 17:00 yesterday however the patient refused to come to the emergency department  The weakness worsened the patient was unable to ambulate and, prompting the wife to bring him to the emergency department for further evaluation  In the emergency department, given his late presentation, he was not a candidate for tPA  CT of his head did not show any acute bleed  Patient was hypertensive in the emergency department was given labetalol as well as hydralazine with minimal fact  His extremity weakness was reportedly getting worse while in the emergency department depending on his blood pressure  Given this, the patient was started on cardene and will be admitted to ICU  Past medical history:   Please see H&P for details    Special Studies:  MRI: 7/31/19 Acute appearing lacunar infarct at the posterior left corona radiata; Old right basal ganglia lacunar infarct    CXR: 7/31/19 no acute cardiopulmonary disease    Social/Education/Vocational Hx:  Pt lives with family    Swallow Information   Current Risks for Dysphagia & Aspiration: CVA  Current Symptoms/Concerns: new neuro event; CVA  Current Diet: NPO   Baseline Diet: regular diet and thin liquids    Baseline Assessment   Behavior/Cognition: alert  Speech/Language Status: able to participate in conversation and able to follow commands  Patient Positioning: upright in chair     Swallow Mechanism Exam   Facial: symmetrical  Labial: WFL  Lingual: WFL  Velum: unable to visualize  Mandible: adequate ROM  Dentition: adequate  Vocal quality:clear/adequate   Volitional Cough: strong/productive   Respiratory: RA    Consistencies Assessed and Performance   Consistencies Administered: thin liquids, puree, mechanical soft solids and hard solids    Oral Stage: pt took large bites, per wife, pt usually eats fast  Pt w/ adequate mastication, manipulation, oral control of liquids, and transfer were WNL; no oral residue or pocketing  Pharyngeal Stage: Swallows were timely and complete w/ no overt s/s aspiration  Voice remained clear  Pt denied any food dysphagia symtpoms or difficulty swallowing pills         Esophageal Concerns: none reported      Results Reviewed with: patient, RN, PA and family   Dysphagia Goals: none at this time      April Ad Marcelino CCC-SLP  Speech Patholgist  [place] license # [address] [facility] [id number]  [place] license # [id number]  Available via [employer]

## 2019-08-01 NOTE — UTILIZATION REVIEW
Continued Stay Review  Date: 8/1/19                       Current Patient Class: INPATIENT  Current Level of Care: ICU  Assessment/Plan: S/P LACUNAR INFARCT  R SIDED FACIAL DROOP WITH R SIDED WEAKNESS NOTED  IV CARDENE GTT D/C 'D THIS AM  IV HYDRAZINE GIVEN X1 THENTO START ON PO Q8H     Pertinent Labs/Diagnostic Results:   Results from last 7 days   Lab Units 08/01/19  0530 07/31/19  1324   WBC Thousand/uL 8 96 7 16   HEMOGLOBIN g/dL 16 3 16 4   HEMATOCRIT % 48 6 50 2*   PLATELETS Thousands/uL 229 221   NEUTROS ABS Thousands/µL 5 35  --      Results from last 7 days   Lab Units 08/01/19  0530 07/31/19  1324   SODIUM mmol/L 140 140   POTASSIUM mmol/L 3 7 3 7   CHLORIDE mmol/L 103 103   CO2 mmol/L 26 26   ANION GAP mmol/L 11 11   BUN mg/dL 13 13   CREATININE mg/dL 1 43* 1 41*   EGFR ml/min/1 73sq m 60 61   CALCIUM mg/dL 9 0 8 9   MAGNESIUM mg/dL 2 1  --    PHOSPHORUS mg/dL 3 5  --      Results from last 7 days   Lab Units 07/31/19  1324   AST U/L 29   ALT U/L 50   ALK PHOS U/L 79   TOTAL PROTEIN g/dL 7 8   ALBUMIN g/dL 3 9   TOTAL BILIRUBIN mg/dL 0 80   BILIRUBIN DIRECT mg/dL 0 20     Results from last 7 days   Lab Units 07/31/19  1314   POC GLUCOSE mg/dl 91     Results from last 7 days   Lab Units 08/01/19  0530 07/31/19  1324   GLUCOSE RANDOM mg/dL 96 96     Results from last 7 days   Lab Units 07/31/19  1557 07/31/19  1324   TROPONIN I ng/mL 0 30* 0 32*     Results from last 7 days   Lab Units 07/31/19  1324   PROTIME seconds 11 4   INR  1 09   PTT seconds 29     Results from last 7 days   Lab Units 08/01/19  0530   TSH 3RD GENERATON uIU/mL 1 244   Vital Signs: BP (!) 183/104 (BP Location: Left arm)   Pulse 62   Temp 97 5 °F (36 4 °C) (Temporal)   Resp 12   Ht 6' 1" (1 854 m)   Wt 95 8 kg (211 lb 3 2 oz)   SpO2 97%   BMI 27 86 kg/m²   Medications:   Scheduled Meds:  Current Facility-Administered Medications:  amLODIPine 10 mg Oral HS   aspirin 81 mg Oral Daily   atorvastatin 80 mg Oral Daily With [employer] enoxaparin 40 mg Subcutaneous Daily   hydrALAZINE 25 mg Oral Q8H [address]   losartan 100 mg Oral HS   nebivolol 20 mg Oral HS     Discharge Plan: TBD  Network Utilization Review Department  Phone: [phone]; Fax [phone]  [email or web address]  ATTENTION: Please call with any questions or concerns to [phone]  and carefully listen to the prompts so that you are directed to the right person  Send all requests for admission clinical reviews, approved or denied determinations and any other requests to fax [phone]   All voicemails are confidential

## 2019-08-01 NOTE — OCCUPATIONAL THERAPY NOTE
Occupational Therapy Evaluation/Treatment       08/01/19 1410   Note Type   Note type Eval/Treat   Restrictions/Precautions   Other Precautions Fall Risk; Chair Alarm; Bed Alarm  (R hemiparesis )   Pain Assessment   Pain Assessment No/denies pain   Home Living   Type of Home House  (1 GAB)   Home Layout Two level; Able to live on main level with bedroom/bathroom  (full bath on both floors of home)   Bathroom Shower/Tub Tub/shower unit   Additional Comments pt reports having medical equipment from a family member, cane, RW and a transport chair    Prior Function   Level of [place] Independent with ADLs and functional mobility   Lives With [place] Help From Family   ADL Assistance Independent   IADLs Independent   Vocational Retired   Lifestyle   Intrinsic Gratification likes to travel and camp, and watch westerns   Subjective   Subjective "I want to go to the beach in September"   ADL   Eating Assistance 4  Minimal Assistance   Eating Deficit Setup   Grooming Assistance 4  Minimal Assistance   Grooming Deficit Setup   UB [address] 3  Moderate Assistance   LB [address] 2  Maximal [address] 3  Moderate Assistance    [address]  2  Maximal Assistance   Transfers   Sit to Stand 3  Moderate assistance   Additional items Assist x 1;Verbal cues  (R knee blocked, hemiwalker and gait belt)   Stand to Sit 3  Moderate assistance   Additional items Assist x 1;Verbal cues   Functional Mobility   Functional Mobility   (did not assess due to R knee weakness )   Balance   Static Sitting Fair +   Dynamic Sitting Fair   Static Standing Poor +   Dynamic Standing Poor   Activity Tolerance   Activity Tolerance Patient limited by fatigue   Medical Staff Made Aware yes aide    RUE Overall PROM   R Shoulder Flexion PROM WFL   R Elbow Flexion PROM WFL   R Wrist Flexion PROM WFL   R Mass Grasp PROM WFL, slight  with 3,4,5 digit active, MMT 2/5   LUE Assessment   LUE Assessment WNL   Hand Function   Gross Motor Coordination Impaired  (RUE, RLE)   Fine Motor Coordination Impaired  (RUE)   Sensation   Light Touch Partial deficits in the RUE   Cognition   Overall Cognitive Status WFL   Arousal/Participation Cooperative   Attention Within functional limits   Orientation Level Oriented X4   Following Commands Follows all commands and directions without difficulty   Assessment   Limitation Decreased ADL status; Decreased UE strength;Decreased Safe judgement during ADL;Decreased endurance;Decreased self-care trans;Decreased high-level ADLs; Decreased UE ROM; Decreased sensation;Decreased fine motor control  (decreased balance and mobility )   Prognosis Good   Assessment Patient evaluated by Occupational Therapy  Patient admitted with Lacunar infarct, acute ([facility] [address] )  The patients occupational profile, medical and therapy history includes a extensive additional review of physical, cognitive, or psychosocial history related to current functional performance  Comorbidities affecting functional mobility and ADLS include: hypertension  Prior to admission, patient was independent with functional mobility without assistive device, independent with ADLS and independent with IADLS  The evaluation identifies the following performance deficits: weakness, decreased ROM, impaired balance, decreased endurance, decreased coordination, increased fall risk, new onset of impairment of functional mobility, decreased ADLS, decreased IADLS, decreased activity tolerance, decreased safety awareness, impaired judgement, decreased sensation and decreased strength, that result in activity limitations and/or participation restrictions   This evaluation requires clinical decision making of high complexity, because the patient presents with comorbidites that affect occupational performance and required significant modification of tasks or assistance with consideration of multiple treatment options  The Barthel Index was used as a functional outcome tool presenting with a score of 35, indicating marked limitations of functional mobility and ADLS  Patient will benefit from skilled Occupational Therapy services to address above deficits and facilitate a safe return to prior level of function  Goals   Patient Goals going to the beach in September    STG Time Frame   (1-7 days)   Short Term Goal  Goals established to promote patient goal of going to the beach in September:  Patient will increase standing tolerance to 3 minutes during ADL task to decrease assistance level and decrease fall risk; Patient will increase bed mobility to min assist in preparation for ADLS and transfers; Patient will increase functional mobility to and from bathroom with nikhil-walker with mod assist of 2 to increase performance with ADLS and to use a toilet; Patient will tolerate 10 minutes of UE ROM/strengthening to increase general activity tolerance and performance in ADLS/IADLS; Patient will improve functional activity tolerance to 10 minutes of sustained functional tasks to increase participation in basic self-care and decrease assistance level;   Patient will increase dynamic sitting balance to fair+ to improve the ability to sit at edge of bed or on a chair for ADLS;  Patient will increase dynamic standing balance to poor+ to improve postural stability and decrease fall risk during standing ADLS and transfers  LTG Time Frame   (8-14 days)   Long Term Goal Goals established to promote patient goal of going to the beach in September:  Patient will increase standing tolerance to 6 minutes during ADL task to decrease assistance level and decrease fall risk; Patient will increase bed mobility to supervision in preparation for ADLS and transfers;  Patient will increase functional mobility to and from bathroom with nikhil-walker with mod assist to increase performance with ADLS and to use a toilet; Patient will tolerate 20 minutes of UE ROM/strengthening to increase general activity tolerance and performance in ADLS/IADLS; Patient will improve functional activity tolerance to 20 minutes of sustained functional tasks to increase participation in basic self-care and decrease assistance level;   Patient will increase dynamic sitting balance to good to improve the ability to sit at edge of bed or on a chair for ADLS;  Patient will increase dynamic standing balance to fair+ to improve postural stability and decrease fall risk during standing ADLS and transfers  Functional Transfer Goals   Pt Will Perform All Functional Transfers   (STG mod assist LTG Min assist)   ADL Goals   Pt Will Perform Eating   (STG supervision LTG independent )   Pt Will Perform Grooming   (STG supervision LTG independent )   Pt Will Perform Bathing   (STG mod assist LTG min assist )   Pt Will Perform UE Dressing   (STG min assist LTG supervision )   Pt Will Perform LE Dressing   (STG mod assist LTG min assist )   Pt Will Perform Toileting   (STG mod assist LTG min assist )   Plan   Treatment Interventions ADL retraining;Functional transfer training;UE strengthening/ROM; Endurance training;Patient/family training;Equipment evaluation/education; Fine motor coordination activities; Neuromuscular reeducation; Compensatory technique education;Continued evaluation; Activityengagement   Goal Expiration Date 08/15/19   OT Frequency 5x/wk   Additional Treatment Session   Start Time 3466   End Time 1410   Treatment Assessment Patient seen for therapeutic exercise  Sit to stand mod assist with verbal cues  Static standing with gait belt, hemiwalker and R knee blocked with mod assist x 1 minute  Stand to sit mod assist with verbal cues  Completed PROM R shoulder, elbow, wrist and hand all planes all motions x 10 seated in recliner  Slight  x 10 supported R  Instructed pt on self ROM    Patient verbalized and demonstrated for elbow flexion and finger extension  Patient instructed on positioning of RUE on pillow for support and wife present  Verbalized understanding  Patient given sponge for gripping  Patient with limited ability to  sponge, slight with R 3,4,5 digits with assist to place in hand  Patient was receptive and motivated to participate in session  Patient would benefit from acute rehab     Recommendation   OT Discharge Recommendation Short Term Rehab   Barthel Index   Feeding 5   Bathing 0   Grooming Score 0   Dressing Score 0   Bladder Score 10   Bowels Score 10   Toilet Use Score 5   Transfers (Bed/Chair) Score 5   Mobility (Level Surface) Score 0   Stairs Score 0   Barthel Index Score 35   Modified [place] Scale   Modified Arun Scale 4   Licensure   [place] License Number  Charanpreston Spivey Tashi [address] OTR/L [id number]

## 2019-08-01 NOTE — PHYSICAL THERAPY NOTE
PT EVALUATION       08/01/19 1055   Note Type   Note type Eval/Treat   Pain Assessment   Pain Assessment No/denies pain   Home Living   Type of Home House  (no GAB)   Home Layout Two level; Able to live on main level with bedroom/bathroom  (bed and full bath on both 1st and 2nd floors)   Bathroom Shower/Tub Tub/shower unit   [address] [place] chair  (pt's wife they can use a "lawn chair")   Additional Comments No DME but pt's wife says they can get a cane, RW and transport chair  Prior Function   Level of [place] Independent with ADLs and functional mobility   Lives With Spouse; Family   Receives Help From Family   ADL Assistance Independent   IADLs Independent   Vocational Retired   Restrictions/Precautions   Other Precautions Fall Risk;Multiple lines  (right hemiparesis)   General   Additional Pertinent History Pt adm with right sided weakness  Family/Caregiver Present Yes  (pt's wife)   Cognition   Overall Cognitive Status WFL   Arousal/Participation Cooperative   Orientation Level Oriented X4   Following Commands Follows all commands and directions without difficulty   LLE Assessment   LLE Assessment WFL  (AA/Arom;hip and knee 3-/5, ankle 2/5)   Transfers   Sit to Stand 3  Moderate assistance   Additional items Assist x 1;Verbal cues  (right knee blocking)   Stand to Sit 3  Moderate assistance   Additional items Assist x 1;Verbal cues   Ambulation/Elevation   Gait Assistance   (min/mod A)   Additional items Assist x 1;Verbal cues; Tactile cues   Assistive Device Francisco J-walker  (Gait belt)   Distance Pt stood with hemiwalker and min/mod A x 1 minute x 2 reps working on standing balance  Balance   Static Sitting Fair +   Static Standing Poor +   Dynamic Standing Poor   Activity Tolerance   Activity Tolerance Patient limited by fatigue;Patient tolerated treatment well   Assessment   Prognosis Good   Problem List Decreased strength;Decreased range of motion;Decreased endurance; Impaired balance;Decreased mobility; Decreased coordination; Impaired judgement;Decreased safety awareness; Impaired tone   Assessment Patient seen for Physical Therapy evaluation  Patient admitted with Lacunar infarct, acute ([facility] [address] )  Comorbidities affecting patient's physical performance include: HLD, HTN  Personal factors affecting patient at time of initial evaluation include: lives in two story house, inability to ambulate household distances, inability to navigate community distances, inability to navigate level surfaces without external assistance, inability to perform dynamic tasks in community, inability to perform ADLS, inability to perform IADLS  and inability to live alone  Prior to admission, patient was independent with functional mobility without assistive device, independent with ADLS, living with spouse and family in a two level home with no steps to enter, ambulating household distance and ambulating community distances  Please find objective findings from Physical Therapy assessment regarding body systems outlined above with impairments and limitations including weakness, decreased ROM, impaired balance, decreased endurance, impaired coordination, gait deviations, decreased activity tolerance, decreased functional mobility tolerance, decreased safety awareness, impaired judgement, fall risk and impaired tone  The Barthel Index was used as a functional outcome tool presenting with a score of 35 today indicating marked limitations of functional mobility and ADLS  Patient's clinical presentation is currently unstable/unpredictable as seen in patient's presentation of vital sign response, increased fall risk, new onset of impairment of functional mobility, decreased endurance and new onset of weakness  Pt would benefit from continued Physical Therapy treatment to address deficits as defined above and maximize level of functional mobility   As demonstrated by objective findings, the assigned level of complexity for this evaluation is high  Goals   Patient Goals go home   STG Expiration Date 08/08/19   Short Term Goal #1 bed mob - min A; trans - min A   Short Term Goal #2 pt will amb with hemiwalker with mod A + 2 functional household distances; Balance for gait and mobility - increase to P+   LTG Expiration Date 08/15/19   Long Term Goal #1 bed mob - I; trans - S/I   Long Term Goal #2 pt will amb with hemiwalker functional household distances - min A; balance with hemiwalker - F/F+ for safe mobility and to decrease fall risk; strength RLE - increase to at least 3+/5   Plan   Treatment/Interventions ADL retraining;Functional transfer training;LE strengthening/ROM; Elevations; Therapeutic exercise; Endurance training;Patient/family training;Equipment eval/education; Bed mobility;Gait training; Compensatory technique education   PT Frequency Once a day   Recommendation   Recommendation Short-term skilled PT  (Acute Rehab;CM aware)   Equipment Recommended   (possible hemiwalker or quad cane pending progress)   Modified [place] Scale   Modified Arun Scale 4   Barthel Index   Feeding 5   Bathing 0   Grooming Score 0   Dressing Score 0   Bladder Score 10   Bowels Score 10   Toilet Use Score 5   Transfers (Bed/Chair) Score 5   Mobility (Level Surface) Score 0   Stairs Score 0   Barthel Index Score 28   Licensure   [place] License Number  [id number] [place] [address] [id number]     Time In:1055  Time Out:1010  Total Time: 15 mins      S:  "I feel like my right leg won't hold me "  O:  Pt trans sit to stand with mod A  Pt stood with hemiwalker with min/mod A + 1 minute working on standing balance  Pt then performed lateral weight shifting with min/mod A with right knee blocked, 5 reps to each side x 2 sets;seated rest;pt stood again with mod A and stood for 1 minute with hemiwalker, then pt stepped with LLE fwd and back x 5 reps with PT blocking right knee  Pt then trans to sitting with mod A   With attempts at further amb fwd, pt will need skilled assist of 2 therapists  A:  Pt presents to PT with right hemiparesis  Pt will benefit from cont skilled PT services to increase pt's strength, balance, endurance, gait and mobility  P:  Cont per PT POC  DCP - acute rehab      Bob Mcnamara [place]   [id number]

## 2019-08-01 NOTE — PLAN OF CARE
Problem: Prexisting or High Potential for Compromised Skin Integrity  Goal: Skin integrity is maintained or improved  Description  INTERVENTIONS:  - Identify patients at risk for skin breakdown  - Assess and monitor skin integrity  - Assess and monitor nutrition and hydration status  - Monitor labs (i e  albumin)  - Assess for incontinence   - Turn and reposition patient  - Assist with mobility/ambulation  - Relieve pressure over bony prominences  - Avoid friction and shearing  - Provide appropriate hygiene as needed including keeping skin clean and dry  - Evaluate need for skin moisturizer/barrier cream  - Collaborate with interdisciplinary team (i e  Nutrition, Rehabilitation, etc )   - Patient/family teaching  Outcome: Progressing     Problem: NEUROSENSORY - ADULT  Goal: Achieves stable or improved neurological status  Description  INTERVENTIONS  - Monitor and report changes in neurological status  - Initiate measures to prevent increased intracranial pressure  - Maintain blood pressure and fluid volume within ordered parameters to optimize cerebral perfusion  - Monitor temperature, glucose, and sodium or any other associated labs   Initiate appropriate interventions as ordered  - Monitor for seizure activity   - Administer anti-seizure medications as ordered  Outcome: Progressing  Goal: Achieves maximal functionality and self care  Description  INTERVENTIONS  - Monitor swallowing and airway patency with patient fatigue and changes in neurological status  - Encourage and assist patient to increase activity and self care with guidance from rehab services  - Encourage visually impaired, hearing impaired and aphasic patients to use assistive/communication devices  Outcome: Progressing     Problem: MUSCULOSKELETAL - ADULT  Goal: Maintain or return mobility to safest level of function  Description  INTERVENTIONS:  - Assess patient's ability to carry out ADLs; assess patient's baseline for ADL function and identify physical deficits which impact ability to perform ADLs (bathing, care of mouth/teeth, toileting, grooming, dressing, etc )  - Assess/evaluate cause of self-care deficits   - Assess range of motion  - Assess patient's mobility; develop plan if impaired  - Assess patient's need for assistive devices and provide as appropriate  - Encourage maximum independence but intervene and supervise when necessary  - Involve family in performance of ADLs  - Assess for home care needs following discharge   - Request OT consult to assist with ADL evaluation and planning for discharge  - Provide patient education as appropriate  Outcome: Progressing  Goal: Maintain proper alignment of affected body part  Description  INTERVENTIONS:  - Support, maintain and protect limb and body alignment  - Provide pt/fam with appropriate education  Outcome: Progressing     Problem: CARDIOVASCULAR - ADULT  Goal: Maintains optimal cardiac output and hemodynamic stability  Description  INTERVENTIONS:  - Monitor I/O, vital signs and rhythm  - Monitor for S/S and trends of decreased cardiac output i e  bleeding, hypotension  - Administer and titrate ordered vasoactive medications to optimize hemodynamic stability  - Assess quality of pulses, skin color and temperature  - Assess for signs of decreased coronary artery perfusion - ex  Angina  - Instruct patient to report change in severity of symptoms  Outcome: Progressing     Problem: Potential for Falls  Goal: Patient will remain free of falls  Description  INTERVENTIONS:  - Assess patient frequently for physical needs  -  Identify cognitive and physical deficits and behaviors that affect risk of falls    -  [place] fall precautions as indicated by assessment   - Educate patient/family on patient safety including physical limitations  - Instruct patient to call for assistance with activity based on assessment  - Modify environment to reduce risk of injury  - Consider OT/PT consult to assist with strengthening/mobility  Outcome: Progressing

## 2019-08-01 NOTE — PROGRESS NOTES
Daily Progress Note - Critical Care/ Stepdown   Sarthak Damico 60 y o  male MRN: [id number]  Unit/Bed#: ICU 02 Encounter: [id number]    ______________________________________________________________________  Assessment:   Principal Problem:    Lacunar infarct, acute ([address] )  Active Problems:    Hypertensive emergency    Elevated troponin    HLD (hyperlipidemia)  Resolved Problems:    * No resolved hospital problems  *      * Lacunar infarct, acute ([address] )  Assessment & Plan  · Patient with delayed presentation to ER after stroke symptoms, not a candidate for tPA at that time  · CT scan without evidence of bleeding  · Blood pressure improved, off of Nicardipine  Continue home medications, may need additional agents  · Stroke work-up  · Echocardiogram pending  · Lipids, TSH, HbA1c  · Continue high dose statin  · Neurology consult  · MRI with acute appearing lacunar infarct at the posterior left corona radiata  ? possibility of demyelinating disease  Old right basal ganglia lacunar infarct  Hypertensive emergency  Assessment & Plan  · Cardene discontinued and blood pressure improved  · Continue home antihypertensive medications    Elevated troponin  Assessment & Plan    · No complaints of chest pain, EKG with LVH   Will need further work-up likely prior to discharge  · Echocardiogram ordered  · Cardiology consulted    HLD (hyperlipidemia)  Assessment & Plan  · High dose statin  · Lipid panel      Plan:    Neuro:   · Delirium: CAM ICU positive no   · If yes, intervention:   · Pain controlled with: no pain  · Pain score: none  · Regulate sleep/wake cycle    CV:   · Rhythm: NSR  · Follow rhythm on telemetry    Pulm:   · IS    GI:   · Nutrition/diet plan: speech eval to advance diet  · Stress ulcer prophylaxis: No prophylaxis needed  · Bowel regimen: None currently  · Last BM:     FEN:   · Fluid/Diuretic plan: No intervention  · Goal 24 hour fluid balance:   · Electrolytes repleted: yes  · Goal: K >4 0, Mag >2 0, and Phos >3 0    :   · Indwelling Siddiqui present: no     ID:  · Trend temps and WBC count    Heme:   · Trend hgb and plts    Endo:   · Glycemic control plan: Blood glucose controlled on current regimen    Msk/Skin:  · Mobility goal:   · PT consult: yes  · OT consult: yes  · Frequent turning and off-loading    Family:  · Family updated within 24 hours: yes   · Family meeting planned today: no     Lines:  · PIV    VTE Prophylaxis:  · Pharmacologic Prophylaxis: Enoxaparin (Lovenox)  · Mechanical Prophylaxis: sequential compression device    Disposition: Transfer to telemetry    Code Status: Level 1 - Full Code    Counseling / Coordination of Care  Total time spent today 31 minutes  Greater than 50% of total time was spent with the patient and / or family counseling and / or coordination of care  A description of the counseling / coordination of care: reviewing imaging, discussing with consultants  ______________________________________________________________________    HPI/24hr events: no acute events    ______________________________________________________________________    Physical Exam:   Physical Exam   Constitutional: He is oriented to person, place, and time  He appears well-developed and well-nourished  No distress  HENT:   Head: Normocephalic and atraumatic  Eyes: Pupils are equal, round, and reactive to light  Cardiovascular: Normal rate and regular rhythm  No murmur heard  Pulmonary/Chest: Effort normal and breath sounds normal  No respiratory distress  Abdominal: Soft  Bowel sounds are normal  He exhibits no distension  Musculoskeletal: He exhibits no edema  Neurological: He is alert and oriented to person, place, and time  2/5 RUE strength, 4/5 RLE strength, 5/5 of the left side  Speech clear, no notable facial droop   Skin: Skin is warm and dry  Nursing note and vitals reviewed        ______________________________________________________________________  Vitals:    08/01/19 0300 08/01/19 0400 19 0500 19 0533   BP: 133/75 155/78 156/88    Pulse: 63 63 67    Resp: 15 12 16    Temp:  (!) 97 °F (36 1 °C)     TempSrc:  Temporal     SpO2: 95% 95% 96%    Weight:    95 8 kg (211 lb 3 2 oz)   Height:                  Temperature:   Temp (24hrs), Av 4 °F (36 3 °C), Min:97 °F (36 1 °C), Max:98 °F (36 7 °C)    Current Temperature: (!) 97 °F (36 1 °C)    Weights:   IBW: 79 9 kg    Body mass index is 27 86 kg/m²  Weight (last 2 days)     Date/Time   Weight    19 0533   95 8 (211 2)    19 1826   96 6 (212 96)    19 1341   99 1 (218 4)    19 1312   99 3 (219)              Hemodynamic Monitoring:  N/A       Non-Invasive/Invasive Ventilation Settings:  Respiratory    Lab Data (Last 4 hours)    None         O2/Vent Data (Last 4 hours)    None              No results found for: PHART, RIK6UBA, PO2ART, TSW8GRL, R0LYXWMM, BEART, SOURCE  SpO2: SpO2: 97 %, SpO2 Activity: SpO2 Activity: At Rest, SpO2 Device: O2 Device: None (Room air), Capnography:      Intake and Outputs:  I/O        07 -  0700  07 -  0700    Urine (mL/kg/hr)  1650    Total Output  1650    Net  -1650                  Nutrition:        Diet Orders   (From admission, onward)             Start     Ordered    19 1833  Room Service  Once     Question:  Type of Service  Answer:  Room Service - Appropriate with Assistance    19 1833    19 1721  Diet NPO  Diet effective now     Question Answer Comment   Diet Type NPO    RD to adjust diet per protocol?  Yes        19 1720                  Labs:   Results from last 7 days   Lab Units 19  0530 19  1324   WBC Thousand/uL 8 96 7 16   HEMOGLOBIN g/dL 16 3 16 4   HEMATOCRIT % 48 6 50 2*   PLATELETS Thousands/uL 229 221   NEUTROS PCT % 60  --    MONOS PCT % 8  --      Results from last 7 days   Lab Units 19  1324   SODIUM mmol/L 140   POTASSIUM mmol/L 3 7   CHLORIDE mmol/L 103   CO2 mmol/L 26   BUN mg/dL 13   CREATININE mg/dL 1 41*   CALCIUM mg/dL 8 9   ALK PHOS U/L 79   ALT U/L 50   AST U/L 29         No results found for: PHOS   Results from last 7 days   Lab Units 07/31/19  1324   INR  1 09   PTT seconds 29     0   Lab Value Date/Time    TROPONINI 0 30 (H) 07/31/2019 1557    TROPONINI 0 32 (H) 07/31/2019 1324         ABG:No results found for: PHART, WHB5IHF, PO2ART, WZI4EEF, P3FYKVEM, BEART, SOURCE    Imaging:   EKG: nsr    Micro:  No results found for: Sae Seat, WOUNDCULT, SPUTUMCULTUR    Allergies: No Known Allergies  Medications:   Scheduled Meds:  Current Facility-Administered Medications:  amLODIPine 10 mg Oral HS Felisha Agarwal PA-C    aspirin 81 mg Oral Daily Felisha Agarwal PA-C    atorvastatin 80 mg Oral Daily With [employer] A BELLO Agarwal    enoxaparin 40 mg Subcutaneous Daily Felisha Agarwal PA-C    losartan 100 mg Oral HS PRICILLA Burden-IRMA    nebivolol 20 mg Oral HS Felisha Agarwal PA-C    niCARdipine 1-15 mg/hr Intravenous Titrated Felisha Agarwal PA-C Last Rate: Stopped (07/31/19 2305)     Continuous Infusions:  niCARdipine 1-15 mg/hr Last Rate: Stopped (07/31/19 2305)     PRN Meds:       Invasive lines and devices:   Invasive Devices     Peripheral Intravenous Line            Peripheral IV 07/31/19 Right Antecubital less than 1 day                   SIGNATURE: Aravind Torres PA-C  DATE: August 1, 2019

## 2019-08-01 NOTE — PROGRESS NOTES
Transfer Note - ICU/Stepdown Transfer to [facility]/MS tele   Kyler Cardenas 60 y o  male MRN: [id number]  [facility]   Unit/Bed#: ICU 02 Encounter: [id number]    Code Status: Level 1 - Full Code    Reason for ICU/Stepdown admission: CVA, hypertensive emergency    Active problems: Principal Problem:    Lac[address]ar infarct, acute ([facility] [address] )  Active Problems:    Hypertensive emergency    Elevated troponin    HLD (hyperlipidemia)  Resolved Problems:    * No resolved hospital problems  *      * Lacunar infarct, acute ([facility] [address] )  Assessment & Plan  · Patient with delayed presentation to ER after stroke symptoms, not a candidate for tPA at that time  · CT scan without evidence of bleeding  · Blood pressure improved, off of Nicardipine  Continue home medications, may need additional agents  · Stroke work-up  · Echocardiogram pending  · Lipids, TSH, HbA1c  · Continue high dose statin  · Neurology consult  · MRI with acute appearing lacunar infarct at the posterior left corona radiata  ? possibility of demyelinating disease  Old right basal ganglia lacunar infarct  Hypertensive emergency  Assessment & Plan  · Cardene discontinued and blood pressure improved  · Continue home antihypertensive medications    Elevated troponin  Assessment & Plan    · No complaints of chest pain, EKG with LVH  Will need further work-up likely prior to discharge  · Echocardiogram ordered  · Cardiology consulted    HLD (hyperlipidemia)  Assessment & Plan  · High dose statin  · Lipid panel        Consultants:   · Cardiology, neurology    History of Present Illness/Summary of clinical course: "Kyler Cardenas is a 60 y o  male his reported history of hypertension hyperlipidemia who presents to the emergency department with right-sided weakness  Wife states that this began to start around 17:00 yesterday however the patient refused to come to the emergency department    The weakness worsened the patient was unable to ambulate and, prompting the wife to bring him to the emergency department for further evaluation  In the emergency department, given his late presentation, he was not a candidate for tPA  CT of his head did not show any acute bleed  Patient was hypertensive in the emergency department was given labetalol as well as hydralazine with minimal fact  His extremity weakness was reportedly getting worse while in the emergency department depending on his blood pressure  Given this, the patient was started on cardene and will be admitted to ICU "     Admitted to ICU on cardene infusion for better blood pressure control  Deficits improved as well as blood pressure control  New oral agents added as patient has had longstanding uncontrolled blood pressure  Cardiology consulted  Benjamen Cancer scheduled for tomorrow  Please refer to today's progress note for further clinical details  Recent or scheduled procedures: lexiscan, possible Loop recorder implant depending on full neurology consult    Outstanding/pending diagnostics: lexiscan       Mobilization Plan: oob    Nutrition Plan: regular diet    Discharge Plan: Patient should be ready for discharge to home after lexiscan, possible loop, better blood pressure control        [  ] Family aware of transfer out of critical care: yes   [  ] Home medications that are not reordered and reason why: aldactone - do not want to joya currently      Spoke with Dr Lebron Pham regarding transfer @    Patient accepted to their service      Divya Carrillo PA-C

## 2019-08-01 NOTE — CONSULTS
[address]   Neurology Initial Consult    Kennedi Santana is a 60 y o  male  ICU 02/ICU 02        Information obtained from:   Chief Complaint   Patient presents with    Extremity Weakness     right sided weakness since last night at 1730       Assessment/Plan:  1  Acute posterior, left-sided infarct of the corona radiata  2  Chronic right basal ganglia infarct  3  Hypertension  4  LASHA  5  Elevated troponin    60year-old very pleasant male admitted for acute left-sided stroke in the corona radiata which has caused right hemiparesis worse in the right upper extremity, minimal facial droop and dysarthria in the setting hypertensive urgency with elevated creatinine and troponins  CTA head and neck without occlusion or significant stenosis  Transthoracic echo is being done and depending on the results loop monitor may be a good idea to rule out AFib  Hemoglobin A1c pending  Continue 81 mg aspirin daily  Continue Lovenox injections  Continue 80 mg atorvastatin  Continue blood pressure control and try for < 200 SYS per Dr Cate Silvestre (elevation permitted due to no tPA given)  He may need further nephrology workup  Continue neuro checks  Aggressive PT/ OT  Speech therapy  Will discuss with Dr Cate Silvestre the utility/ benefits of repeating brain/ cervical MRI w/wo with my observation of perpendicular brain lesions to the corpus suzan  R/o demyelination, however likely not correlated to acute stroke  HPI:    Mr Kennedi Santana is a very pleasant 59 yo right handed  male here with his wife in the ICU, admitted for acute stoke  The patients last known normal was at 5:00 p m  On 7/30/2019  His wife tells me that the patient has drove 6 hours and arrived home  He was sitting in his car in the parking lot and honked the car horn at his wife to come out because he was having right arm weakness  He states that at that point he was able to ambulate into the house    He did not want to go to the hospital at that time because he thought that he was just fatigued from the long drive  He went to bed and he woke up with worsening symptoms:  Worsening right arm weakness any states that the low right leg was so weak that he could not bear weight  He states he could not get out of bed  Briefly he did feel lightheaded per his wife  The patient does not remember how long he felt lightheaded  He did not have loss of consciousness  He did not have loss of vision  She his symptoms progressed throughout the morning while his wife was at work  When she came home for lunch he mentions that he was worse and that is when they presented to the hospital   His wife did note dysarthria, which is improving over time per her history  She did note a very mild right perioral droop but the patient does not have difficulty chewing or swallowing  Stroke alert was called at 1:16 p m   Dr Julio C Marroquin did the stroke alert and NIHSS per ED exam was at least 5 with right upper extremity and right lower extremity paresis, dysmetria, ataxia  CTA head and neck was not acute, no LVO, IV tPA not given as this was out of time window symptom start  His blood pressure was noted to be extremely are has high, at one point:  260/127, heart rate 88, RR 20, O2 98%, temp 97° F   He was placed on a Cardene drip and blood pressure sore has responded  Troponins were elevated and ICU is monitoring this trend  Currently /104 on losartan, amlodipine and nebivolol  The patient does not remember having a right basal ganglia stroke in the past, although this was noted on head CT as a chronic stroke  He states that few years ago he was in [facility] with his wife any thought that he was low in glucose and he felt dizzy and states that he blacked out    He did lose consciousness for about 5 minutes per his history but had no sequelae event    He does remember any other symptoms that would be consistent with stroke in the past     At the time of his stroke he was supposed to be taking 81 mg aspirin and 10 mg simvastatin but I am unsure if he was compliant with these medications  His wife states that he does not smoke, he does not drink alcohol, but "he does not need the right things      He did have documented elevated troponins and possible lateral cardiac ischemia, however no chest pain  He denies a family history of stroke  He does have a family history of cancer but no personal history of cancer  Past Medical History:   Diagnosis Date    Hyperlipidemia     Hypertension        History reviewed  No pertinent surgical history      No Known Allergies      Current Facility-Administered Medications:     amLODIPine (NORVASC) tablet 10 mg, 10 mg, Oral, HS, Felisha Agarwal PA-C, 10 mg at 07/31/19 2123    aspirin (ECOTRIN LOW STRENGTH) EC tablet 81 mg, 81 mg, Oral, Daily, PRICILLA Burden-C, 81 mg at 08/01/19 8871    atorvastatin (LIPITOR) tablet 80 mg, 80 mg, Oral, Daily With Dinner, PRICILLA Burden-C, 80 mg at 07/31/19 1859    enoxaparin (LOVENOX) subcutaneous injection 40 mg, 40 mg, Subcutaneous, Daily, Felisha Agarwal PA-C, 40 mg at 08/01/19 4996    hydrALAZINE (APRESOLINE) tablet 25 mg, 25 mg, Oral, Q8H POP, Felisha Agarwal PA-C    losartan (COZAAR) tablet 100 mg, 100 mg, Oral, HS, Felisha Agarwal, PA-C, 100 mg at 07/31/19 2124    nebivolol (BYSTOLIC) tablet 20 mg, 20 mg, Oral, HS, Felisha Agarwal PA-C, 20 mg at 07/31/19 2124    Social History     Socioeconomic History    Marital status: /Civil Union     Spouse name: Not on file    Number of children: Not on file    Years of education: Not on file    Highest education level: Not on file   Occupational History    Not on file   Social Needs    Financial resource strain: Not on file    Food insecurity:     Worry: Not on file     Inability: Not on file    Transportation needs:     Medical: Not on file     Non-medical: Not on file   Tobacco Use    Smoking status: Never Smoker    Smokeless tobacco: Never Used   Substance and Sexual Activity    Alcohol use: Never     Frequency: Never    Drug use: Never    Sexual activity: Yes     Partners: Female   Lifestyle    Physical activity:     Days per week: Not on file     Minutes per session: Not on file    Stress: Not on file   Relationships    Social connections:     Talks on phone: Not on file     Gets together: Not on file     Attends Buddhist service: Not on file     Active member of club or organization: Not on file     Attends meetings of clubs or organizations: Not on file     Relationship status: Not on file    Intimate partner violence:     Fear of current or ex partner: Not on file     Emotionally abused: Not on file     Physically abused: Not on file     Forced sexual activity: Not on file   Other Topics Concern    Not on file   Social History Narrative    Not on file       History reviewed  No pertinent family history  Review of systems:  Please see HPI for positive symptoms  No fever, no chills, no weight change  Ocular: No drainage, no blurred vision  HEENT:  No sore throat, earache, or congestion  No neck pain  COR:  No chest pain  No palpitations  Lungs:  no sob, wheezing,  GI:  no  nausea, no vomiting, no diarrhea, no constipation, no anorexia  :  No dysuria, frequency, or urgency  No hematuria  Musculoskeletal:  No joint pain or swelling or edema  Skin:  No rash or itching  Psychiatric:  no anxiety, no depression  Endocrine:  No polyuria or polydipsia  Physical examination:  Vitals:    08/01/19 0900   BP: (!) 171/84   Pulse: 67   Resp: 17   Temp:    SpO2: 97%       GENERAL APPEARANCE:  The patient is alert, oriented  He is in no acute distress  HEENT:  Head is normocephalic  The sinuses are otherwise nontender  Pupils are equal and reactive  NECK:  Supple without lymphadenopathy  HEART:  Regular rate and rhythm  LUNGS:  clear to auscultation   No crackles or wheezes are heard   ABDOMEN:  Soft, nontender, nondistended with good bowel sounds heard  EXTREMITIES:  Without cyanosis, clubbing or edema  Mental status: The patient is alert, attentive, and oriented  Speech is mildly dysarthric, but good repetition, comprehension, and naming  he recalls 3/3 objects at 5 minutes  Cranial nerves:  CN II: Visual fields are full to confrontation  Fundoscopic exam is normal with sharp discs and no vascular changes    Pupils are 3 mm and briskly reactive to light  CN III, IV, VI: At primary gaze, there is no eye deviation  CN V: Facial sensation is intact to pinprick in all 3 divisions bilaterally  Corneal responses are intact  CN VII: Mild right perioral droop, and cannot make a perfect seal with lips; normal eye closure, eyebrows raise symmetrically  CN VIII: Hearing is normal to rubbing fingers  CN IX, X: Palate elevates symmetrically  Phonation is normal   CN XI: Head turning and shoulder shrug are intact  CN XII: Tongue is midline with normal movements and no atrophy  Motor:  PD not tested as he cannot elevate RUE against gravity  Muscle bulk and tone are normal      Muscle exam  Arm Right Left Leg Right Left   Deltoid 2/5 5/5 Iliopsoas 3/5 5/5   Biceps 2/5 5/5 Quads 3/5 5/5   Triceps 2/5 5/5 Hamstrings 3/5 5/5   Wrist Extension 2/5 5/5 Ankle Dorsi Flexion 3/5 5/5   Wrist Flexion 2/5 5/5 Ankle Plantar Flexion 3/5 5/5   Interossei 2/5 5/5 Ankle Eversion 3/5 5/5   APB 3/5 5/5 Ankle Inversion 3/5 5/5       Reflexes   RJ BJ TJ KJ AJ Plantars Silveira's   Right 3+ 3+ 3+ 3+ 2+ Upgoing Not present   Left 2+ 2+ 2+ 2+ 2+ Equivocal Not present     Sensory:  Light touch, pinprick, position sense, and vibration sense are intact in fingers and toes  Coordination:  Rapid alternating movements and fine finger movements are intact on LEFT  There is no dysmetria on finger-to-nose and heel-knee-shin on the LEFT  There are no abnormal or extraneous movements   RIGHT side cannot be accurately assessed due to hemiparesis  Gait/Stance:  Antalgic with right foot drop/ RLE weakness  Lab Results   Component Value Date    WBC 8 96 08/01/2019    HGB 16 3 08/01/2019    HCT 48 6 08/01/2019    MCV 93 08/01/2019     08/01/2019     Lab Results   Component Value Date    HGBA1C 5 8 08/01/2019     Lab Results   Component Value Date    ALT 50 07/31/2019    AST 29 07/31/2019    ALKPHOS 79 07/31/2019     Lab Results   Component Value Date    CALCIUM 9 0 08/01/2019    K 3 7 08/01/2019    CO2 26 08/01/2019     08/01/2019    BUN 13 08/01/2019    CREATININE 1 43 (H) 08/01/2019         Radiology        Review of reports and notes reveal:    Independent Interpretation of images or specimens:  X-ray Chest 1 View Portable    Result Date: 7/31/2019  No acute cardiopulmonary disease  Workstation performed: [id number]     Mri Brain Wo Contrast    Result Date: 7/31/2019  Acute appearing lacunar infarct at the posterior left corona radiata  Moderate periventricular and subcortical foci of white matter T2 hyperintensity which is nonspecific and most likely related to chronic small vessel ischemic changes  A few of these white matter lesions when perpendicular to the callosal septal interface raising the possibility of demyelinating disease  Other less likely etiologies include vasculitis, Lyme and migraines  Old right basal ganglia lacunar infarct  I personally discussed this study with Chiki Martinez on 7/31/2019 at 5:05 PM  Workstation performed: [id number]     Ct Stroke Alert Brain    Result Date: 7/31/2019  No acute intracranial hemorrhage, mass effect or edema  Microangiopathic changes  Chronic appearing right basal ganglia lacunar infarctions  Findings were directly discussed with Dr Amarjit Willson on 7/31/2019 1:32 PM  Workstation performed: [id number]     Cta Stroke Alert (head/neck)    Result Date: 7/31/2019  1  No hemodynamically significant stenosis in the major arteries of the neck   2   No intracranial aneurysm or major intracranial arterial stenosis  3   Frothy soft tissue abnormality in the nondependent portion of the trachea with imaging features favoring aspiration  Differential diagnosis could include tracheal polyp or other soft tissue lesion  4   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  Findings were directly discussed with Dr Honey Weinberg on 7/31/2019 1:32 PM  Workstation performed: [id number]               Thank you for this consult  Total time of encounter: 70+ minutes  More than 50% of time was spent in counseling and coordination of care of patient  FRANTZ Conley [facility]  [address]  [place] [address]

## 2019-08-01 NOTE — SOCIAL WORK
LOS - 2 days    SW met with pt and wife to assess needs and discuss plans  Discussed goals of making sure pt's needs are met upon discharge  Pt lives with wife  Independent, driving, retired but working odd jobs  No DME or HHC needs in the past   Pt's PCP is Dr Bree Richards MD   Per pt's wife they are using [facility] for prescriptions at this time  Wife was able to get most of his medications stocked up before transitioning from COBRA to the new insurance they have through the [address]  Typically they use [employer] or [employer] with [facility] card  Pt does not currently have POA/advanced directives  Offered information on and assistance with completing advanced directive  Pt and wife declined at this time  Wife is very concerned about hospital coverage with the "[employer]"  Due to CVA discharge recommendation at this time is acute rehab  Pt and wife willing to consider rehab however final decision will be based on continued need and coverage by insurance  Pt and wife agreeable with referral being made and chose [facility] Acute Rehab  Referral will be made  Wife is also willing to do outpatient therapy and work with pt at home herself  Offered ongoing support and assistance  SW will continue to follow to monitor progress and assist with planning as needed

## 2019-08-01 NOTE — CONSULTS
Consultation - Cardiology   [facility]     Sheyla Pimentel 60 y o  male MRN: [id number]  : 1955  Unit/Bed#: ICU 02 Encounter: [id number]      Assessment & Plan     1  Acute CVA  Involving left-sided infarct of coronary radiata and chronic right basal ganglia infarct    2  Hypertensive emergency with evidence of target organ damage as manifested by stroke and elevated troponin    3  Non ST elevation MI  Secondary to extremely high blood pressure and stroke  Troponin has been flat    4  Abnormal EKG with LVH and strain, certainly cannot rule out ischemia    5  History of uncontrolled hypertension    6  Dyslipidemia     7  Moderate to severe L left ventricle hypertrophy with normal LV systolic function and no significant valvular disease and grade 2 diastolic dysfunction consistent with hypertensive heart disease    Summary of Recommendations:        Spoke to patient's wife, ICU team and Neurology  Continue Rx for stroke as per Neurology  Will add clonidine p r n  To keep blood pressure around 160 at this time  Will need further adjustment of medications  Echo reviewed with the patient shows severe LVH  Lexiscan stress test tomorrow  There was a question of loop recorder  I will let neuro to decide whether patient need loop recorder to rule out any occult atrial fibrillation I believe his stroke symptoms and stroke may be due to underlying severe hypertensive heart disease  Thank you for your consultation  Physician Requesting Consult: Prakash Oliver DO    Reason for Consult / Principal Problem:  Abnormal EKG, elevated troponin, uncontrolled hypertension  Inpatient consult to Cardiology  Consult performed by: Elver Galvin MD  Consult ordered by: Dionisio Santiago PA-C          HPI: Sheyla Pimentel is a 60y o  year old male who presents with right-sided weakness since yesterday  Cardiology consult is called as patient has abnormal EKG and very high blood pressure    Patient has past medical history significant for longstanding essential hypertension, dyslipidemia who felt weakness of right upper extremity with minimal facial drop and dysarthria  His blood pressure was extremely high when he came in  It was running more than 200/110  Patient has longstanding history of essential hypertension not very well controlled  As per patient's wife his blood pressure generally runs 160s  He has been on medication for some time  Initially it was under diagnosed many years ago  When he came in he had abnormal EKG which shows LVH with strain certainly cannot rule out ischemia along with minimally elevated troponin  Blood pressure is still high  Patient denies any chest pain shortness of breath  He does not smoke  He was pretty active before this episode no other issues at this time  Review of Systems   Constitutional: Positive for activity change  Negative for chills, diaphoresis, fever and unexpected weight change  HENT: Negative for congestion  Eyes: Negative for discharge and redness  Respiratory: Negative for cough, chest tightness, shortness of breath and wheezing  Cardiovascular: Negative  Negative for chest pain, palpitations and leg swelling  Uncontrolled hypertension   Gastrointestinal: Negative for abdominal pain, diarrhea and nausea  Endocrine: Negative  Genitourinary: Negative for decreased urine volume and urgency  Musculoskeletal: Positive for gait problem  Negative for arthralgias and back pain  Skin: Negative for rash and wound  Allergic/Immunologic: Negative  Neurological: Positive for facial asymmetry and weakness  Negative for dizziness, seizures, syncope, light-headedness and headaches  Right arm   Hematological: Negative  Psychiatric/Behavioral: Negative for agitation and confusion  The patient is nervous/anxious          Historical Information   Past Medical History:   Diagnosis Date    Hyperlipidemia     Hypertension History reviewed  No pertinent surgical history  Social History     Substance and Sexual Activity   Alcohol Use Never    Frequency: Never     Social History     Substance and Sexual Activity   Drug Use Never     Social History     Tobacco Use   Smoking Status Never Smoker   Smokeless Tobacco Never Used     Family History: History reviewed  No pertinent family history  Meds/Allergies    PTA meds:    Medications Prior to Admission   Medication    amlodipine-olmesartan (TED) 10-40 MG    aspirin (ECOTRIN LOW STRENGTH) 81 mg EC tablet    Multiple Vitamin (MULTIVITAMIN) tablet    nebivolol (BYSTOLIC) 20 MG tablet    simvastatin (ZOCOR) 10 mg tablet    spironolactone (ALDACTONE) 25 mg tablet      No Known Allergies    Current Facility-Administered Medications:     amLODIPine (NORVASC) tablet 10 mg, 10 mg, Oral, HS, Felisha Agarwal PA-C, 10 mg at 07/31/19 2123    aspirin (ECOTRIN LOW STRENGTH) EC tablet 81 mg, 81 mg, Oral, Daily, Felisha Agarwal PA-C, 81 mg at 08/01/19 5476    atorvastatin (LIPITOR) tablet 80 mg, 80 mg, Oral, Daily With Dinner, Felisha Agarwal PA-C, 80 mg at 07/31/19 1859    cloNIDine (CATAPRES) tablet 0 2 mg, 0 2 mg, Oral, TID PRN, Noemi James MD    enoxaparin (LOVENOX) subcutaneous injection 40 mg, 40 mg, Subcutaneous, Daily, Felisha Agarwal PA-C, 40 mg at 08/01/19 5005    hydrALAZINE (APRESOLINE) injection 10 mg, 10 mg, Intravenous, Q4H PRN, Felisha Agarwal PA-C    hydrALAZINE (APRESOLINE) tablet 25 mg, 25 mg, Oral, Q8H POP, Felisha Agarwal PA-C, 25 mg at 08/01/19 0956    losartan (COZAAR) tablet 100 mg, 100 mg, Oral, HS, Felisha Agarwal, PA-C, 100 mg at 07/31/19 2124    nebivolol (BYSTOLIC) tablet 20 mg, 20 mg, Oral, HS, Felishavenessa Agarwal, PA-C, 20 mg at 07/31/19 2124    VTE Pharmacologic Prophylaxis:   Lovenox    Objective:   Vitals: Blood pressure (!) 195/108, pulse 72, temperature 97 5 °F (36 4 °C), temperature source Temporal, resp   rate (!) 25, height 6' 1" (1 854 m), weight 95 8 kg (211 lb 3 2 oz), SpO2 96 %  Body mass index is 27 86 kg/m²  BP Readings from Last 3 Encounters:   08/01/19 (!) 195/108     Orthostatic Blood Pressures      Most Recent Value   Blood Pressure  (!) 195/108 filed at 08/01/2019 1000   Patient Position - Orthostatic VS  Sitting filed at 08/01/2019 0900          Intake/Output Summary (Last 24 hours) at 8/1/2019 1037  Last data filed at 8/1/2019 0601  Gross per 24 hour   Intake 164 58 ml   Output 1650 ml   Net -1485 42 ml       Invasive Devices     Peripheral Intravenous Line            Peripheral IV 07/31/19 Right Antecubital less than 1 day                  Physical Exam:   Physical Exam    Neurologic:  Alert & oriented x 3, cannot move right arm  No other new focal neurological loss  Constitutional:  Well developed, well nourished, non-toxic appearance   Eyes:  Pupil equal and reacting to light, conjunctiva normal   HENT:  Atraumatic, oropharynx moist, Neck- normal range of motion, no tenderness, supple   Respiratory:  Bilateral air entry, mostly clear to auscultation  Cardiovascular: S1-S2 regular with a 2/6 ejection systolic murmur and S4 is present  GI:  Soft, nondistended, normal bowel sounds, nontender, no hepatosplenomegaly appreciated  Musculoskeletal:  No edema, no tenderness, no deformities     Skin:  Well hydrated, no rash   Lymphatic:  No lymphadenopathy noted   Extremities:  No edema    Labs:   Troponins:   Results from last 7 days   Lab Units 07/31/19  1557 07/31/19  1324   TROPONIN I ng/mL 0 30* 0 32*       CBC with diff:   Results from last 7 days   Lab Units 08/01/19  0530 07/31/19  1324   WBC Thousand/uL 8 96 7 16   HEMOGLOBIN g/dL 16 3 16 4   HEMATOCRIT % 48 6 50 2*   MCV fL 93 94   PLATELETS Thousands/uL 229 221   MCH pg 31 2 30 8   MCHC g/dL 33 5 32 7   RDW % 13 7 13 6   MPV fL 11 6 12 1   NRBC AUTO /100 WBCs 0  --        CMP:   Results from last 7 days   Lab Units 08/01/19  0530 07/31/19  1324   SODIUM mmol/L 140 140   POTASSIUM mmol/L 3 7 3 7 CHLORIDE mmol/L 103 103   CO2 mmol/L 26 26   ANION GAP mmol/L 11 11   BUN mg/dL 13 13   CREATININE mg/dL 1 43* 1 41*   CALCIUM mg/dL 9 0 8 9   AST U/L  --  29   ALT U/L  --  50   ALK PHOS U/L  --  79   TOTAL PROTEIN g/dL  --  7 8   ALBUMIN g/dL  --  3 9   TOTAL BILIRUBIN mg/dL  --  0 80   EGFR ml/min/1 73sq m 60 61   GLUCOSE RANDOM mg/dL 96 96       Magnesium:   Results from last 7 days   Lab Units 19  0530   MAGNESIUM mg/dL 2 1     Coags:   Results from last 7 days   Lab Units 19  1324   PTT seconds 29   INR  1 09     TSH:    Results from last 7 days   Lab Units 19  0530   TSH 3RD GENERATON uIU/mL 1 244     Lipid Profile:   Results from last 7 days   Lab Units 19  0530   CHOLESTEROL mg/dL 193   TRIGLYCERIDES mg/dL 97   HDL mg/dL 44   LDL CALC mg/dL 130*     Lipid Profile:   Lab Results   Component Value Date    CHOLESTEROL 193 2019    HDL 44 2019    LDLCALC 130 (H) 2019    TRIG 97 2019     Hgb A1c:   Results from last 7 days   Lab Units 19  0530   HEMOGLOBIN A1C % 5 8         Imaging & Testing   Cardiac testing:   Results for orders placed during the hospital encounter of 19   Echo complete with contrast if indicated    [address]  [phone]    Transthoracic Echocardiogram  2D, M-mode, Doppler, and Color Doppler    Study date:  01-Aug-2019    Patient: Noah Chou  MR number: [id number]  Account number: [de-identified]  : 1955  Age: 60 years  Gender: Male  Status: Inpatient  Location: Bedside  Height: 73 in  Weight: 210 5 lb  BP: 208/ 110 mmHg    Indications: Cerebral Thrombosis    Diagnoses: I67 9 - Cerebrovascular disease, unspecified    Sonographer:  JUSTIN Damon  Primary Physician:  Cinda Bhagat MD  Referring Physician:  Moisés Ballard MD  Group:  [facility]  Interpreting Physician:  Noemi James MD    SUMMARY    LEFT VENTRICLE:  Systolic function was normal  Ejection fraction was estimated in the range of 60 % to 65 % to be 65 %  There were no regional wall motion abnormalities  Wall thickness was moderately increased  There was severe concentric hypertrophy  Doppler parameters were consistent with abnormal left ventricular relaxation (grade 1 diastolic dysfunction)  Doppler parameters were consistent with high ventricular filling pressure  LEFT ATRIUM:  The atrium was mildly dilated  ATRIAL SEPTUM:  No defect or patent foramen ovale was identified by bubble study  MITRAL VALVE:  There was trace regurgitation  TRICUSPID VALVE:  There was mild regurgitation  Estimated peak PA pressure was 35 mmHg  IVC, HEPATIC VEINS:  The respirophasic change in diameter was more than 50%  HISTORY: PRIOR HISTORY: HTN, Hyperlipidemia    PROCEDURE: The procedure was performed at the bedside  This was a routine study  The transthoracic approach was used  The study included complete 2D imaging, M-mode, complete spectral Doppler, and color Doppler  The heart rate was 70 bpm,  at the start of the study  Images were not obtained from the subcostal acoustic windows  Intravenous contrast (agitated saline, 10 ml) was administered to evaluate shunting  Image quality was adequate  LEFT VENTRICLE: Size was normal  Systolic function was normal  Ejection fraction was estimated in the range of 60 % to 65 % to be 65 %  There were no regional wall motion abnormalities  Wall thickness was moderately increased  There was  severe concentric hypertrophy  DOPPLER: Doppler parameters were consistent with abnormal left ventricular relaxation (grade 1 diastolic dysfunction)  Doppler parameters were consistent with high ventricular filling pressure  RIGHT VENTRICLE: The size was normal  Systolic function was normal     LEFT ATRIUM: The atrium was mildly dilated      ATRIAL SEPTUM: No defect or patent foramen ovale was identified by bubble study     RIGHT ATRIUM: Size was normal     MITRAL VALVE: There was normal leaflet separation  DOPPLER: The transmitral velocity was within the normal range  There was no evidence for stenosis  There was trace regurgitation  AORTIC VALVE: The valve was trileaflet  Leaflets exhibited mildly increased thickness and normal cuspal separation  DOPPLER: Transaortic velocity was minimally increased  There was no evidence for stenosis  There was no regurgitation  TRICUSPID VALVE: DOPPLER: There was mild regurgitation  Estimated peak PA pressure was 35 mmHg  PULMONIC VALVE: DOPPLER: There was no significant regurgitation  PERICARDIUM: There was no thickening or calcification  There was no pericardial effusion  AORTA: The root exhibited normal size  SYSTEMIC VEINS: IVC: The inferior vena cava was normal in size  The respirophasic change in diameter was more than 50%  SYSTEM MEASUREMENT TABLES    2D mode  AoR Diam 2D: 3 7 cm  LA Diam (2D): 4 cm  LA/Ao (2D): 1 08  FS (2D Teich): 28 8 %  IVSd (2D): 1 52 cm  LVDEV: 99 8 cmï¾³  LVESV: 44 5 cmï¾³  LVIDd(2D): 4 65 cm  LVISd (2D): 3 31 cm  LVPWd (2D): 1 65 cm  SV (Teich): 55 3 cmï¾³    Apical four chamber  LVEF A4C: 55 %    Unspecified Scan Mode  MV Peak A Jc: 816 mm/s  MV Peak E Jc  Mean: 569 mm/s  MVA (PHT): 3 93 cmï¾²  PHT: 56 ms  Max P mm[Hg]  V Max: 2730 mm/s  Vmax: 2730 mm/s  RA Area: 14 9 cmï¾²  RA Volume: 35 6 cmï¾³  TAPSE: 2 1 cm    [employer] Accredited Echocardiography Laboratory    Prepared and electronically signed by    Kristine Mckeon MD  Signed 01-Aug-2019 10:03:30           Imaging: I have personally reviewed pertinent reports  X-ray Chest 1 View Portable    Result Date: 2019  Narrative: CHEST INDICATION:   Stroke  COMPARISON:  None EXAM PERFORMED/VIEWS:  XR CHEST PORTABLE FINDINGS: Cardiomediastinal silhouette appears unremarkable  The lungs are clear  No pneumothorax or pleural effusion   Osseous structures appear within normal limits for patient age  Impression: No acute cardiopulmonary disease  Workstation performed: [id number]     Mri Brain Wo Contrast    Result Date: 7/31/2019  Narrative: MRI BRAIN WITHOUT CONTRAST INDICATION: CVA  COMPARISON:   None  TECHNIQUE:  Sagittal T1, axial T2, axial FLAIR, axial T1, axial [place] and axial diffusion imaging  IMAGE QUALITY:  Diagnostic  FINDINGS: BRAIN PARENCHYMA:  There is no discrete mass, mass effect or midline shift  There is no intracranial hemorrhage  There is a small area of restricted diffusion measuring 15 x 8 mm at the posterior left corona radiata compatible with an acute focus of ischemia  There are are moderate periventricular and subcortical foci of white matter T2 hyperintensity which is nonspecific and most likely related to chronic small vessel ischemic changes  A few of these white matter lesions when perpendicular to the  callosal septal interface raising the possibility of demyelinating disease  There is an old right basal ganglia lacunar infarct with minimal hemosiderin deposition  VENTRICLES:  The ventricles are normal in size and contour  SELLA AND PITUITARY GLAND:  Normal  ORBITS:  Normal  PARANASAL SINUSES:  There is mucosal thickening of the left maxillary sinus  There is minimal mucosal thickening of the bilateral ethmoid air cells  VASCULATURE:  Evaluation of the major intracranial vasculature demonstrates appropriate flow voids  CALVARIUM AND SKULL BASE:  Normal  EXTRACRANIAL SOFT TISSUES:  Normal      Impression: Acute appearing lacunar infarct at the posterior left corona radiata  Moderate periventricular and subcortical foci of white matter T2 hyperintensity which is nonspecific and most likely related to chronic small vessel ischemic changes  A few of these white matter lesions when perpendicular to the callosal septal interface raising the possibility of demyelinating disease    Other less likely etiologies include vasculitis, Lyme and migraines  Old right basal ganglia lacunar infarct  I personally discussed this study with Shasha Vasquez on 7/31/2019 at 5:05 PM  Workstation performed: [id number]     Ct Stroke Alert Brain    Result Date: 7/31/2019  Narrative: CT BRAIN - STROKE ALERT PROTOCOL INDICATION:   Right-sided weakness and limb ataxia  Symptoms started 5:00 PM last evening  Dysmetria  COMPARISON:  None  TECHNIQUE:  CT examination of the brain was performed  In addition to axial images, coronal reformatted images were created and submitted for interpretation  Radiation dose length product (DLP) for this visit:  972 mGy-cm   This examination, like all CT scans performed in the [facility], was performed utilizing techniques to minimize radiation dose exposure, including the use of iterative reconstruction and automated exposure control  IMAGE QUALITY:  Diagnostic  FINDINGS:  PARENCHYMA:  Decreased attenuation is noted in the supratentorial white matter demonstrating an appearance most consistent with moderate microangiopathic change  No intracranial mass, mass effect or midline shift  No acute intracranial hemorrhage  No CT signs of acute infarction  Chronic appearing right basal ganglia lacunar infarctions  Normal intracranial vasculature  VENTRICLES AND EXTRA-AXIAL SPACES:  Normal for patient's age  VISUALIZED ORBITS AND PARANASAL SINUSES:  Mild left and minimal right maxillary mucosal thickening  CALVARIUM AND EXTRACRANIAL SOFT TISSUES:   Normal      Impression: No acute intracranial hemorrhage, mass effect or edema  Microangiopathic changes  Chronic appearing right basal ganglia lacunar infarctions   Findings were directly discussed with Dr Ras Xiao on 7/31/2019 1:32 PM  Workstation performed: [id number]     Cta Stroke Alert (head/neck)    Result Date: 7/31/2019  Narrative: CTA NECK AND BRAIN WITH CONTRAST INDICATION: Right-sided weakness and limb ataxia COMPARISON:   Concurrent head CT TECHNIQUE:   Post contrast imaging was performed after administration of iodinated contrast through the neck and brain  Post contrast axial 0 625 mm images timed to opacify the arterial system  3D rendering was performed on an independent workstation  MIP reconstructions performed  Coronal reconstructions were performed of the noncontrast portion of the brain  Radiation dose length product (DLP) for this visit:  382 mGy-cm   This examination, like all CT scans performed in the [facility], was performed utilizing techniques to minimize radiation dose exposure, including the use of iterative reconstruction and automated exposure control  IV Contrast:  85 mL of iohexol (OMNIPAQUE)  IMAGE QUALITY:   Diagnostic FINDINGS: CERVICAL VASCULATURE AORTIC ARCH AND GREAT VESSELS:  Mild athersclerotic disease of the arch, proximal great vessels and visualized subclavian vessels  No significant stenosis  RIGHT VERTEBRAL ARTERY CERVICAL SEGMENT:  Normal origin  The vessel is normal in caliber throughout the neck  LEFT VERTEBRAL ARTERY CERVICAL SEGMENT:  Normal origin  The vessel is normal in caliber throughout the neck  RIGHT EXTRACRANIAL CAROTID SEGMENT:  Mild atherosclerotic disease of the distal common carotid artery and proximal cervical internal carotid artery without significant stenosis compared to the more distal ICA  LEFT EXTRACRANIAL CAROTID SEGMENT:  Mild atherosclerotic disease of the distal common carotid artery and proximal cervical internal carotid artery without significant stenosis compared to the more distal ICA  NASCET criteria was used to determine the degree of internal carotid artery diameter stenosis  INTRACRANIAL VASCULATURE INTERNAL CAROTID ARTERIES:  Normal enhancement of the intracranial portions of the internal carotid arteries  Normal ophthalmic artery origins  Normal ICA terminus  ANTERIOR CIRCULATION:  Symmetric A1 segments and anterior cerebral arteries with normal enhancement    Normal anterior communicating artery  MIDDLE CEREBRAL ARTERY CIRCULATION:  M1 segment and middle cerebral artery branches demonstrate normal enhancement bilaterally  DISTAL VERTEBRAL ARTERIES:  Normal distal vertebral arteries  Posterior inferior cerebellar artery origins are normal  Normal vertebral basilar junction  BASILAR ARTERY:  Basilar artery is normal in caliber  Normal superior cerebellar arteries  POSTERIOR CEREBRAL ARTERIES: Both posterior cerebral arteries arises from the basilar tip  Both arteries demonstrate normal enhancement  Normal posterior communicating arteries  DURAL VENOUS SINUSES:  Not well opacified on this arterial phase scan NON VASCULAR ANATOMY BONY STRUCTURES:  Scattered spondylotic changes noted  Ossification of posterior longitudinal ligament C4-C7 noted  This ossification causes moderate to severe spinal stenosis  SOFT TISSUES OF THE NECK:  Unremarkable  THORACIC INLET:  There is appearance frothy soft tissue abnormality in the nondependent portion of the trachea on image 267, series 2 likely aspirated material   Tracheal polyp could be considered in the differential diagnosis although the presence of air lucencies admixed with soft tissue density argues for aspiration  Incidental note is made of a right paratracheal air cyst posteriorly on image 227, series 2 at the thoracic inlet  Tiny 2 mm subpleural nodule image 253, series 2 in the left upper lobe noted  Impression: 1  No hemodynamically significant stenosis in the major arteries of the neck  2   No intracranial aneurysm or major intracranial arterial stenosis  3   Frothy soft tissue abnormality in the nondependent portion of the trachea with imaging features favoring aspiration  Differential diagnosis could include tracheal polyp or other soft tissue lesion  4   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   Findings were directly discussed with Dr Eusebio Cheema on 7/31/2019 1:32 PM  Workstation performed: [id number]     EKG/ Monitor: Personally reviewed  Normal sinus rhythm LVH with strain  Heart rate around 65 beats per minute  Currently monitor heart rate around 75 beats per minute  Code Status: Level 1 - Full Code  Advance Directive and Living Will:      POLST:        Yana Blackwood MD MD, [facility] [place]      "This note has been constructed using a voice recognition system  Therefore there may be syntax, spelling, and/or grammatical errors   Please call if you have any questions  "

## 2019-08-02 ENCOUNTER — APPOINTMENT (INPATIENT)
Dept: NON INVASIVE DIAGNOSTICS | Facility: HOSPITAL | Age: 64
DRG: 064 | End: 2019-08-02
Payer: COMMERCIAL

## 2019-08-02 ENCOUNTER — APPOINTMENT (INPATIENT)
Dept: RADIOLOGY | Facility: HOSPITAL | Age: 64
DRG: 064 | End: 2019-08-02
Payer: COMMERCIAL

## 2019-08-02 PROBLEM — E78.5 HLD (HYPERLIPIDEMIA): Chronic | Status: ACTIVE | Noted: 2019-07-31

## 2019-08-02 LAB
ANION GAP SERPL CALCULATED.3IONS-SCNC: 7 MMOL/L (ref 4–13)
BACTERIA UR QL AUTO: ABNORMAL /HPF
BASOPHILS # BLD AUTO: 0.06 THOUSANDS/ΜL (ref 0–0.1)
BASOPHILS NFR BLD AUTO: 1 % (ref 0–1)
BILIRUB UR QL STRIP: NEGATIVE
BUN SERPL-MCNC: 18 MG/DL (ref 5–25)
CALCIUM SERPL-MCNC: 8.9 MG/DL (ref 8.3–10.1)
CHLORIDE SERPL-SCNC: 103 MMOL/L (ref 100–108)
CLARITY UR: CLEAR
CO2 SERPL-SCNC: 29 MMOL/L (ref 21–32)
COLOR UR: ABNORMAL
CREAT SERPL-MCNC: 1.53 MG/DL (ref 0.6–1.3)
EOSINOPHIL # BLD AUTO: 0.1 THOUSAND/ΜL (ref 0–0.61)
EOSINOPHIL NFR BLD AUTO: 1 % (ref 0–6)
ERYTHROCYTE [DISTWIDTH] IN BLOOD BY AUTOMATED COUNT: 14.1 % (ref 11.6–15.1)
GFR SERPL CREATININE-BSD FRML MDRD: 56 ML/MIN/1.73SQ M
GLUCOSE SERPL-MCNC: 98 MG/DL (ref 65–140)
GLUCOSE UR STRIP-MCNC: NEGATIVE MG/DL
HCT VFR BLD AUTO: 52.1 % (ref 36.5–49.3)
HGB BLD-MCNC: 17.2 G/DL (ref 12–17)
HGB UR QL STRIP.AUTO: NEGATIVE
HYALINE CASTS #/AREA URNS LPF: ABNORMAL /LPF
IMM GRANULOCYTES # BLD AUTO: 0.02 THOUSAND/UL (ref 0–0.2)
IMM GRANULOCYTES NFR BLD AUTO: 0 % (ref 0–2)
KETONES UR STRIP-MCNC: NEGATIVE MG/DL
LEUKOCYTE ESTERASE UR QL STRIP: NEGATIVE
LYMPHOCYTES # BLD AUTO: 3.49 THOUSANDS/ΜL (ref 0.6–4.47)
LYMPHOCYTES NFR BLD AUTO: 42 % (ref 14–44)
MCH RBC QN AUTO: 31.2 PG (ref 26.8–34.3)
MCHC RBC AUTO-ENTMCNC: 33 G/DL (ref 31.4–37.4)
MCV RBC AUTO: 94 FL (ref 82–98)
MONOCYTES # BLD AUTO: 0.62 THOUSAND/ΜL (ref 0.17–1.22)
MONOCYTES NFR BLD AUTO: 7 % (ref 4–12)
NEUTROPHILS # BLD AUTO: 4.06 THOUSANDS/ΜL (ref 1.85–7.62)
NEUTS SEG NFR BLD AUTO: 49 % (ref 43–75)
NITRITE UR QL STRIP: NEGATIVE
NON-SQ EPI CELLS URNS QL MICRO: ABNORMAL /HPF
NRBC BLD AUTO-RTO: 0 /100 WBCS
PH UR STRIP.AUTO: 6 [PH]
PLATELET # BLD AUTO: 241 THOUSANDS/UL (ref 149–390)
PMV BLD AUTO: 11.7 FL (ref 8.9–12.7)
POTASSIUM SERPL-SCNC: 4 MMOL/L (ref 3.5–5.3)
PROT UR STRIP-MCNC: ABNORMAL MG/DL
RBC # BLD AUTO: 5.52 MILLION/UL (ref 3.88–5.62)
RBC #/AREA URNS AUTO: ABNORMAL /HPF
SODIUM SERPL-SCNC: 139 MMOL/L (ref 136–145)
SP GR UR STRIP.AUTO: 1.01 (ref 1–1.03)
UROBILINOGEN UR QL STRIP.AUTO: 1 E.U./DL
WBC # BLD AUTO: 8.35 THOUSAND/UL (ref 4.31–10.16)
WBC #/AREA URNS AUTO: ABNORMAL /HPF

## 2019-08-02 PROCEDURE — 97110 THERAPEUTIC EXERCISES: CPT

## 2019-08-02 PROCEDURE — 78452 HT MUSCLE IMAGE SPECT MULT: CPT

## 2019-08-02 PROCEDURE — 85025 COMPLETE CBC W/AUTO DIFF WBC: CPT | Performed by: PHYSICIAN ASSISTANT

## 2019-08-02 PROCEDURE — 93975 VASCULAR STUDY: CPT

## 2019-08-02 PROCEDURE — 93975 VASCULAR STUDY: CPT | Performed by: SURGERY

## 2019-08-02 PROCEDURE — 81001 URINALYSIS AUTO W/SCOPE: CPT | Performed by: INTERNAL MEDICINE

## 2019-08-02 PROCEDURE — 99232 SBSQ HOSP IP/OBS MODERATE 35: CPT | Performed by: INTERNAL MEDICINE

## 2019-08-02 PROCEDURE — A9502 TC99M TETROFOSMIN: HCPCS

## 2019-08-02 PROCEDURE — 97530 THERAPEUTIC ACTIVITIES: CPT

## 2019-08-02 PROCEDURE — 93017 CV STRESS TEST TRACING ONLY: CPT

## 2019-08-02 PROCEDURE — 80048 BASIC METABOLIC PNL TOTAL CA: CPT | Performed by: PHYSICIAN ASSISTANT

## 2019-08-02 RX ORDER — SODIUM CHLORIDE 9 MG/ML
50 INJECTION, SOLUTION INTRAVENOUS CONTINUOUS
Status: DISCONTINUED | OUTPATIENT
Start: 2019-08-02 | End: 2019-08-03

## 2019-08-02 RX ORDER — ACETAMINOPHEN 325 MG/1
650 TABLET ORAL EVERY 6 HOURS PRN
Status: DISCONTINUED | OUTPATIENT
Start: 2019-08-02 | End: 2019-08-06 | Stop reason: HOSPADM

## 2019-08-02 RX ADMIN — HYDRALAZINE HYDROCHLORIDE 10 MG: 20 INJECTION INTRAMUSCULAR; INTRAVENOUS at 15:20

## 2019-08-02 RX ADMIN — REGADENOSON 0.4 MG: 0.08 INJECTION, SOLUTION INTRAVENOUS at 12:45

## 2019-08-02 RX ADMIN — HYDRALAZINE HYDROCHLORIDE 25 MG: 25 TABLET ORAL at 05:18

## 2019-08-02 RX ADMIN — LOSARTAN POTASSIUM 100 MG: 50 TABLET ORAL at 22:11

## 2019-08-02 RX ADMIN — SODIUM CHLORIDE 75 ML/HR: 0.9 INJECTION, SOLUTION INTRAVENOUS at 15:18

## 2019-08-02 RX ADMIN — NEBIVOLOL HYDROCHLORIDE 20 MG: 10 TABLET ORAL at 22:18

## 2019-08-02 RX ADMIN — AMLODIPINE BESYLATE 10 MG: 10 TABLET ORAL at 22:11

## 2019-08-02 RX ADMIN — ENOXAPARIN SODIUM 40 MG: 40 INJECTION SUBCUTANEOUS at 08:44

## 2019-08-02 RX ADMIN — CLOPIDOGREL BISULFATE 75 MG: 75 TABLET ORAL at 08:44

## 2019-08-02 RX ADMIN — HYDRALAZINE HYDROCHLORIDE 10 MG: 20 INJECTION INTRAMUSCULAR; INTRAVENOUS at 10:09

## 2019-08-02 RX ADMIN — ATORVASTATIN CALCIUM 80 MG: 80 TABLET ORAL at 17:50

## 2019-08-02 RX ADMIN — HYDRALAZINE HYDROCHLORIDE 25 MG: 25 TABLET ORAL at 22:11

## 2019-08-02 RX ADMIN — HYDRALAZINE HYDROCHLORIDE 25 MG: 25 TABLET ORAL at 14:18

## 2019-08-02 RX ADMIN — ASPIRIN 81 MG: 81 TABLET, COATED ORAL at 08:44

## 2019-08-02 NOTE — PROGRESS NOTES
Progress Note - Cardiology   [facility]     Dipika Anders 60 y o  male MRN: [id number]  : 1955  Unit/Bed#: ICU 02 Encounter: [id number]    Assessment and Plan:   1  Acute CVA  Involving left-sided infarct of coronary radiata and chronic right basal ganglia infarct  Had discussion with Neurology and Medicine  Most likely etiology is extremely uncontrolled hypertension  Patient does not need loop recorder to rule out occult atrial fibrillation at history allergy appears to be hypertension  Blood pressure controlled strongly recommended     2  Hypertensive emergency with evidence of target organ damage as manifested by stroke and elevated troponin  Blood pressure is significantly improved  May need to titrate the medication further  At some point we will change amlodipine to nifedipine XL       3  Non ST elevation MI  Secondary to extremely high blood pressure and stroke  Troponin has been flat  Nuclear stress test salt pending     4  Abnormal EKG with LVH and strain, awaiting Lexiscan stress test     5  History of uncontrolled hypertension  Blood pressure is slowly improving  Current blood pressure is acceptable  Can go up on hydralazine      6  Dyslipidemia   Continue statin     7  Moderate to severe L left ventricle hypertrophy with normal LV systolic function and no significant valvular disease and grade 2 diastolic dysfunction consistent with hypertensive heart disease    Plan  Will review stress test when done  Agree with adding hydralazine and continue other blood pressure medications of Bystolic, Cozaar  May need diuretics at some point, if creatinine comes to baseline    Subjective / Objective:   Patient seen and evaluated  No new complaints  Underwent stress test   Echo reviewed  Still has right hand weakness  Denies any chest pain or any shortness of breath      Vitals: Blood pressure 165/92, pulse 70, temperature 97 8 °F (36 6 °C), temperature source Tympanic, resp  rate 21, height 6' 1" (1 854 m), weight 95 8 kg (211 lb 3 2 oz), SpO2 97 %  Vitals:    07/31/19 1826 08/01/19 0533   Weight: 96 6 kg (212 lb 15 4 oz) 95 8 kg (211 lb 3 2 oz)     Body mass index is 27 86 kg/m²  BP Readings from Last 3 Encounters:   08/02/19 165/92     Orthostatic Blood Pressures      Most Recent Value   Blood Pressure  165/92 filed at 08/02/2019 1200   Patient Position - Orthostatic VS  Lying filed at 08/02/2019 0200        I/O       07/31 0701 - 08/01 0700 08/01 0701 - 08/02 0700 08/02 0701 - 08/03 0700    P  O   840     I V  (mL/kg) 164 6 (1 7)      Total Intake(mL/kg) 164 6 (1 7) 840 (8 8)     Urine (mL/kg/hr) 1650 1700 (0 7)     Total Output 1650 1700     Net -1485 4 -860                Invasive Devices     Peripheral Intravenous Line            Peripheral IV 07/31/19 Right Antecubital 2 days                  Intake/Output Summary (Last 24 hours) at 8/2/2019 1417  Last data filed at 8/2/2019 0501  Gross per 24 hour   Intake 360 ml   Output 1300 ml   Net -940 ml         Physical Exam:   Physical Exam    Neurologic:  Alert & oriented x 3,  no focal deficits noted , no new weakness  Constitutional:  Well developed, well nourished,  With no acute distress  Eyes:  PERRL, conjunctiva normal   HENT:  Atraumatic, external ears normal, nose normal,   NECK: Normal range of motion, no tenderness, neck is supple , No JVP  Respiratory:  Bilateral air entry mostly clear to auscultation  Cardiovascular: S1-S2 regular with a 2/6 ejection systolic murmur  S4 is heard  GI:  Soft, nondistended, normal bowel sounds, nontender, no hepatosplenomegaly appreciated  Musculoskeletal:  No tenderness, no deformities      Extremities:  No edema  Psychiatric:  Speech and behavior appropriate             Medications/ Allergies:       Current Facility-Administered Medications:  acetaminophen 650 mg Oral Q6H PRN Yoseph Araujo MD   amLODIPine 10 mg Oral HS Felisha Agarwal PA-C   aspirin 81 mg Oral Daily Felisha MARQUES BELLO Agarwal   atorvastatin 80 mg Oral Daily With BELLO Montez   clopidogrel 75 mg Oral Daily Rosa Lopez MD   enoxaparin 40 mg Subcutaneous Daily Felisha Agarwal PA-C   hydrALAZINE 10 mg Intravenous Q4H PRN Felisha Agarwal PA-C   hydrALAZINE 25 mg Oral Q8H [address] Felisha Agarwal PA-C   losartan 100 mg Oral HS Felisha Agarwal PA-C   nebivolol 20 mg Oral HS Felisha Agarwal PA-C       acetaminophen 650 mg Q6H PRN   hydrALAZINE 10 mg Q4H PRN     No Known Allergies        Labs:   Troponins:  Results from last 7 days   Lab Units 07/31/19  1557 07/31/19  1324   TROPONIN I ng/mL 0 30* 0 32*       CBC with diff:  Results from last 7 days   Lab Units 08/02/19  0514 08/01/19  0530 07/31/19  1324   WBC Thousand/uL 8 35 8 96 7 16   HEMOGLOBIN g/dL 17 2* 16 3 16 4   HEMATOCRIT % 52 1* 48 6 50 2*   MCV fL 94 93 94   PLATELETS Thousands/uL 241 229 221   MCH pg 31 2 31 2 30 8   MCHC g/dL 33 0 33 5 32 7   RDW % 14 1 13 7 13 6   MPV fL 11 7 11 6 12 1   NRBC AUTO /100 WBCs 0 0  --        CMP:  Results from last 7 days   Lab Units 08/02/19  0514 08/01/19  0530 07/31/19  1324   SODIUM mmol/L 139 140 140   POTASSIUM mmol/L 4 0 3 7 3 7   CHLORIDE mmol/L 103 103 103   CO2 mmol/L 29 26 26   ANION GAP mmol/L 7 11 11   BUN mg/dL 18 13 13   CREATININE mg/dL 1 53* 1 43* 1 41*   CALCIUM mg/dL 8 9 9 0 8 9   AST U/L  --   --  29   ALT U/L  --   --  50   ALK PHOS U/L  --   --  79   TOTAL PROTEIN g/dL  --   --  7 8   ALBUMIN g/dL  --   --  3 9   TOTAL BILIRUBIN mg/dL  --   --  0 80   EGFR ml/min/1 73sq m 56 60 61       Magnesium:  Results from last 7 days   Lab Units 08/01/19  0530   MAGNESIUM mg/dL 2 1     Coags:  Results from last 7 days   Lab Units 07/31/19  1324   PTT seconds 29   INR  1 09     TSH:  Results from last 7 days   Lab Units 08/01/19  0530   TSH 3RD GENERATON uIU/mL 1 244     Lipid Profile:  Results from last 7 days   Lab Units 08/01/19  0530   CHOLESTEROL mg/dL 193   TRIGLYCERIDES mg/dL 97   HDL mg/dL 44   LDL CALC mg/dL 130* Hgb A1c:  Results from last 7 days   Lab Units 08/01/19  0530   HEMOGLOBIN A1C % 5 8     NT-proBNP: No results for input(s): NTBNP in the last 72 hours  Imaging & Testing   I have personally reviewed pertinent reports  X-ray Chest 1 View Portable    Result Date: 7/31/2019  Narrative: CHEST INDICATION:   Stroke  COMPARISON:  None EXAM PERFORMED/VIEWS:  XR CHEST PORTABLE FINDINGS: Cardiomediastinal silhouette appears unremarkable  The lungs are clear  No pneumothorax or pleural effusion  Osseous structures appear within normal limits for patient age  Impression: No acute cardiopulmonary disease  Workstation performed: [id number]     Mri Brain Wo Contrast    Result Date: 7/31/2019  Narrative: MRI BRAIN WITHOUT CONTRAST INDICATION: CVA  COMPARISON:   None  TECHNIQUE:  Sagittal T1, axial T2, axial FLAIR, axial T1, axial [place] and axial diffusion imaging  IMAGE QUALITY:  Diagnostic  FINDINGS: BRAIN PARENCHYMA:  There is no discrete mass, mass effect or midline shift  There is no intracranial hemorrhage  There is a small area of restricted diffusion measuring 15 x 8 mm at the posterior left corona radiata compatible with an acute focus of ischemia  There are are moderate periventricular and subcortical foci of white matter T2 hyperintensity which is nonspecific and most likely related to chronic small vessel ischemic changes  A few of these white matter lesions when perpendicular to the  callosal septal interface raising the possibility of demyelinating disease  There is an old right basal ganglia lacunar infarct with minimal hemosiderin deposition  VENTRICLES:  The ventricles are normal in size and contour  SELLA AND PITUITARY GLAND:  Normal  ORBITS:  Normal  PARANASAL SINUSES:  There is mucosal thickening of the left maxillary sinus  There is minimal mucosal thickening of the bilateral ethmoid air cells   VASCULATURE:  Evaluation of the major intracranial vasculature demonstrates appropriate flow voids  CALVARIUM AND SKULL BASE:  Normal  EXTRACRANIAL SOFT TISSUES:  Normal      Impression: Acute appearing lacunar infarct at the posterior left corona radiata  Moderate periventricular and subcortical foci of white matter T2 hyperintensity which is nonspecific and most likely related to chronic small vessel ischemic changes  A few of these white matter lesions when perpendicular to the callosal septal interface raising the possibility of demyelinating disease  Other less likely etiologies include vasculitis, Lyme and migraines  Old right basal ganglia lacunar infarct  I personally discussed this study with Demetrius Cordero on 7/31/2019 at 5:05 PM  Workstation performed: [id number]     Ct Stroke Alert Brain    Result Date: 7/31/2019  Narrative: CT BRAIN - STROKE ALERT PROTOCOL INDICATION:   Right-sided weakness and limb ataxia  Symptoms started 5:00 PM last evening  Dysmetria  COMPARISON:  None  TECHNIQUE:  CT examination of the brain was performed  In addition to axial images, coronal reformatted images were created and submitted for interpretation  Radiation dose length product (DLP) for this visit:  972 mGy-cm   This examination, like all CT scans performed in the [facility], was performed utilizing techniques to minimize radiation dose exposure, including the use of iterative reconstruction and automated exposure control  IMAGE QUALITY:  Diagnostic  FINDINGS:  PARENCHYMA:  Decreased attenuation is noted in the supratentorial white matter demonstrating an appearance most consistent with moderate microangiopathic change  No intracranial mass, mass effect or midline shift  No acute intracranial hemorrhage  No CT signs of acute infarction  Chronic appearing right basal ganglia lacunar infarctions  Normal intracranial vasculature  VENTRICLES AND EXTRA-AXIAL SPACES:  Normal for patient's age   VISUALIZED ORBITS AND PARANASAL SINUSES:  Mild left and minimal right maxillary mucosal thickening  CALVARIUM AND EXTRACRANIAL SOFT TISSUES:   Normal      Impression: No acute intracranial hemorrhage, mass effect or edema  Microangiopathic changes  Chronic appearing right basal ganglia lacunar infarctions  Findings were directly discussed with Dr Bettie Lucio on 7/31/2019 1:32 PM  Workstation performed: [id number]     Vas Renal Artery Complete    Result Date: 8/2/2019  Narrative:  [facility] REPORT CLINICAL: Indications: Patient presents to evaluate the renal arteries secondary to uncontrolled HTN  Patient is currently on 4 medications for Blood pressure  Risk Factors: The patient has history of HTN and HLD  The patient current BMI is 27 84, Weight is 211 lb and height is 73 in  Clinical: Right Pressure:  153/79 mm Hg, Left Pressure:  / mm Hg  FINDINGS:  Unilateral       PSV (cm/s)  EDV (cm/s)  Sup-Jennie Ao              103              Sup Renal Aorta          89              Celiac                  142          27  Prox   SMA               138               Right         Impression  PSV (cm/s)  EDV (cm/s)   RAR    RI  Kidney (cm)  Ostial Renal                      89          27  0 87  0 70               Prox Renal                        79          25  0 77  0 68               Mid Renal                         60          15  0 58  0 75               Dist Renal                        59          17  0 57  0 71               Kidney                                                              11 50  Hilum 1                           19           4        0 76               Mid Pole                          18           6        0 66               Hilum 3                           18           6        0 68               Renal         Patent                                                        Left          Impression  PSV (cm/s)  EDV (cm/s)   RAR    RI  Kidney (cm)  Ostial Renal                      95          30  0 92  0 68               Prox Renal                       115          35 1  11  0 70               Mid Renal                         61          15  0 59  0 75               Dist Renal                        48          15  0 47  0 68               Kidney                                                              10 30  Hilum 1                           20           7        0 66               Mid Pole                          19           7        0 60               Hilum 3                           20           7        0 68               Renal         Patent                                                          CONCLUSION: The abdominal aorta is widely patent and normal caliber  RIGHT RENAL: No evidence of significant arterial occlusive disease in the main renal artery  Patent renal vein  Renovascular resistive index of 0 76  Renal/Aorta Ratio: 0 87  The Kidney measures 11 5 cm  LEFT RENAL: No evidence of significant arterial occlusive disease in the main renal artery  Patent renal vein  Renovascular resistive index of 0 68  Renal/Aorta Ratio: 1 11  The Kidney measures 10 3 cm  MESENTERIC: Celiac and superior mesenteric arteries are patent  Cta Stroke Alert (head/neck)    Result Date: 7/31/2019  Narrative: CTA NECK AND BRAIN WITH CONTRAST INDICATION: Right-sided weakness and limb ataxia COMPARISON:   Concurrent head CT TECHNIQUE:   Post contrast imaging was performed after administration of iodinated contrast through the neck and brain  Post contrast axial 0 625 mm images timed to opacify the arterial system  3D rendering was performed on an independent workstation  MIP reconstructions performed  Coronal reconstructions were performed of the noncontrast portion of the brain  Radiation dose length product (DLP) for this visit:  382 mGy-cm     This examination, like all CT scans performed in the [facility], was performed utilizing techniques to minimize radiation dose exposure, including the use of iterative reconstruction and automated exposure control  IV Contrast:  85 mL of iohexol (OMNIPAQUE)  IMAGE QUALITY:   Diagnostic FINDINGS: CERVICAL VASCULATURE AORTIC ARCH AND GREAT VESSELS:  Mild athersclerotic disease of the arch, proximal great vessels and visualized subclavian vessels  No significant stenosis  RIGHT VERTEBRAL ARTERY CERVICAL SEGMENT:  Normal origin  The vessel is normal in caliber throughout the neck  LEFT VERTEBRAL ARTERY CERVICAL SEGMENT:  Normal origin  The vessel is normal in caliber throughout the neck  RIGHT EXTRACRANIAL CAROTID SEGMENT:  Mild atherosclerotic disease of the distal common carotid artery and proximal cervical internal carotid artery without significant stenosis compared to the more distal ICA  LEFT EXTRACRANIAL CAROTID SEGMENT:  Mild atherosclerotic disease of the distal common carotid artery and proximal cervical internal carotid artery without significant stenosis compared to the more distal ICA  NASCET criteria was used to determine the degree of internal carotid artery diameter stenosis  INTRACRANIAL VASCULATURE INTERNAL CAROTID ARTERIES:  Normal enhancement of the intracranial portions of the internal carotid arteries  Normal ophthalmic artery origins  Normal ICA terminus  ANTERIOR CIRCULATION:  Symmetric A1 segments and anterior cerebral arteries with normal enhancement  Normal anterior communicating artery  MIDDLE CEREBRAL ARTERY CIRCULATION:  M1 segment and middle cerebral artery branches demonstrate normal enhancement bilaterally  DISTAL VERTEBRAL ARTERIES:  Normal distal vertebral arteries  Posterior inferior cerebellar artery origins are normal  Normal vertebral basilar junction  BASILAR ARTERY:  Basilar artery is normal in caliber  Normal superior cerebellar arteries  POSTERIOR CEREBRAL ARTERIES: Both posterior cerebral arteries arises from the basilar tip  Both arteries demonstrate normal enhancement  Normal posterior communicating arteries   DURAL VENOUS SINUSES:  Not well opacified on this arterial phase scan NON VASCULAR ANATOMY BONY STRUCTURES:  Scattered spondylotic changes noted  Ossification of posterior longitudinal ligament C4-C7 noted  This ossification causes moderate to severe spinal stenosis  SOFT TISSUES OF THE NECK:  Unremarkable  THORACIC INLET:  There is appearance frothy soft tissue abnormality in the nondependent portion of the trachea on image 267, series 2 likely aspirated material   Tracheal polyp could be considered in the differential diagnosis although the presence of air lucencies admixed with soft tissue density argues for aspiration  Incidental note is made of a right paratracheal air cyst posteriorly on image 227, series 2 at the thoracic inlet  Tiny 2 mm subpleural nodule image 253, series 2 in the left upper lobe noted  Impression: 1  No hemodynamically significant stenosis in the major arteries of the neck  2   No intracranial aneurysm or major intracranial arterial stenosis  3   Frothy soft tissue abnormality in the nondependent portion of the trachea with imaging features favoring aspiration  Differential diagnosis could include tracheal polyp or other soft tissue lesion  4   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  Findings were directly discussed with Dr Lawanda Torres on 2019 1:32 PM  Workstation performed: [id number]        EKG / Monitor: Personally reviewed  Monitor shows no evidence of any significant tachy-kathy arrhythmias      Cardiac testing:   Results for orders placed during the hospital encounter of 19   Echo complete with contrast if indicated    Narrative [address]  [address] [address]  [phone]    Transthoracic Echocardiogram  2D, M-mode, Doppler, and Color Doppler    Study date:  01-Aug-2019    Patient: Sanjana Spivey  MR number: [id number]  Account number: [de-identified]  : 1955  Age: 60 years  Gender: Male  Status: Inpatient  Location: Bedside  Height: 73 in  Weight: 210 5 lb  BP: 208/ 110 mmHg    Indications: Cerebral Thrombosis    Diagnoses: I67 9 - Cerebrovascular disease, unspecified    Sonographer:  JUSTIN Coles  Primary Physician:  Arian Oreilly MD  Referring Physician:  Cassy Cummings MD  Group:  [id number]  Interpreting Physician:  Kyrie Jim MD    SUMMARY    LEFT VENTRICLE:  Systolic function was normal  Ejection fraction was estimated in the range of 60 % to 65 % to be 65 %  There were no regional wall motion abnormalities  Wall thickness was moderately increased  There was severe concentric hypertrophy  Doppler parameters were consistent with abnormal left ventricular relaxation (grade 1 diastolic dysfunction)  Doppler parameters were consistent with high ventricular filling pressure  LEFT ATRIUM:  The atrium was mildly dilated  ATRIAL SEPTUM:  No defect or patent foramen ovale was identified by bubble study  MITRAL VALVE:  There was trace regurgitation  TRICUSPID VALVE:  There was mild regurgitation  Estimated peak PA pressure was 35 mmHg  IVC, HEPATIC VEINS:  The respirophasic change in diameter was more than 50%  HISTORY: PRIOR HISTORY: HTN, Hyperlipidemia    PROCEDURE: The procedure was performed at the bedside  This was a routine study  The transthoracic approach was used  The study included complete 2D imaging, M-mode, complete spectral Doppler, and color Doppler  The heart rate was 70 bpm,  at the start of the study  Images were not obtained from the subcostal acoustic windows  Intravenous contrast (agitated saline, 10 ml) was administered to evaluate shunting  Image quality was adequate  LEFT VENTRICLE: Size was normal  Systolic function was normal  Ejection fraction was estimated in the range of 60 % to 65 % to be 65 %  There were no regional wall motion abnormalities  Wall thickness was moderately increased   There was  severe concentric hypertrophy  DOPPLER: Doppler parameters were consistent with abnormal left ventricular relaxation (grade 1 diastolic dysfunction)  Doppler parameters were consistent with high ventricular filling pressure  RIGHT VENTRICLE: The size was normal  Systolic function was normal     LEFT ATRIUM: The atrium was mildly dilated  ATRIAL SEPTUM: No defect or patent foramen ovale was identified by bubble study  RIGHT ATRIUM: Size was normal     MITRAL VALVE: There was normal leaflet separation  DOPPLER: The transmitral velocity was within the normal range  There was no evidence for stenosis  There was trace regurgitation  AORTIC VALVE: The valve was trileaflet  Leaflets exhibited mildly increased thickness and normal cuspal separation  DOPPLER: Transaortic velocity was minimally increased  There was no evidence for stenosis  There was no regurgitation  TRICUSPID VALVE: DOPPLER: There was mild regurgitation  Estimated peak PA pressure was 35 mmHg  PULMONIC VALVE: DOPPLER: There was no significant regurgitation  PERICARDIUM: There was no thickening or calcification  There was no pericardial effusion  AORTA: The root exhibited normal size  SYSTEMIC VEINS: IVC: The inferior vena cava was normal in size  The respirophasic change in diameter was more than 50%  SYSTEM MEASUREMENT TABLES    2D mode  AoR Diam 2D: 3 7 cm  LA Diam (2D): 4 cm  LA/Ao (2D): 1 08  FS (2D Teich): 28 8 %  IVSd (2D): 1 52 cm  LVDEV: 99 8 cmï¾³  LVESV: 44 5 cmï¾³  LVIDd(2D): 4 65 cm  LVISd (2D): 3 31 cm  LVPWd (2D): 1 65 cm  SV (Teich): 55 3 cmï¾³    Apical four chamber  LVEF A4C: 55 %    Unspecified Scan Mode  MV Peak A Jc: 816 mm/s  MV Peak E Jc   Mean: 569 mm/s  MVA (PHT): 3 93 cmï¾²  PHT: 56 ms  Max P mm[Hg]  V Max: 2730 mm/s  Vmax: 2730 mm/s  RA Area: 14 9 cmï¾²  RA Volume: 35 6 cmï¾³  TAPSE: 2 1 cm    [employer] Accredited Echocardiography Laboratory    Prepared and electronically signed by    [employer] Gregg Richey MD  Signed 01-Aug-2019 10:03:30         Dr Марина Galvan MD [facility]      "This note has been constructed using a voice recognition system  Therefore there may be syntax, spelling, and/or grammatical errors   Please call if you have any questions  "

## 2019-08-02 NOTE — SOCIAL WORK
LOS - 3 days    SW following to assist with DCP  Acute rehab placement is planned  Pt has been clinically accepted by [facility] and can be admitted pending insurance authorization  Discussed above with pt's wife  Wife feels acute rehab will be best route for pt's recovery  SW contacted [facility] ([phone]) to request authorization for acute rehab  Clinical faxed to number provided by  ([phone])  Per physician discharge is being considered for this weekend  SW will continue to follow to assist with transfer to [facility] for acute rehab when ready and authorization is received

## 2019-08-02 NOTE — PLAN OF CARE
Problem: OCCUPATIONAL THERAPY ADULT  Goal: Performs self-care activities at highest level of function for planned discharge setting  See evaluation for individualized goals  Outcome: Progressing  Note:   Limitation: Decreased ADL status, Decreased UE strength, Decreased Safe judgement during ADL, Decreased endurance, Decreased self-care trans, Decreased high-level ADLs, Decreased UE ROM, Decreased sensation, Decreased fine motor control(decreased balance and mobility )  Prognosis: Good  Assessment: Patient is cooperative and plesasant  Patient unable to completed AROM R hand today  Instructed pt on self ROM, pt and wife receptive and demonstrated understanding         OT Discharge Recommendation: Short Term Rehab

## 2019-08-02 NOTE — PHYSICAL THERAPY NOTE
PT TREATMENT     08/02/19 1445   Pain Assessment   Pain Assessment No/denies pain   Restrictions/Precautions   Other Precautions Fall Risk;Bed Alarm; Chair Alarm;Telemetry   General   Chart Reviewed Yes   Cognition   Arousal/Participation Cooperative   Attention Attends with cues to redirect   Orientation Level Oriented X4   Following Commands Follows one step commands with increased time or repetition   Subjective   Subjective "I can't move my R side"   Transfers   Sit to Stand 3  Moderate assistance   Additional items Assist x 2  (gait belt, hemiwalker)   Stand to Sit 3  Moderate assistance   Additional items Assist x 2  (gait belt, hemiwalker)   Additional Comments Pt stood 2x5 minutes with verbal and tactile cues to keep center of mass over base of support  Ambulation/Elevation   Gait Assistance 1  Dependent  (R knee buckling/recurvatum when trying to take steps)   Balance   Static Sitting Fair +   Dynamic Sitting Fair   Static Standing Poor   Dynamic Standing Poor   Activity Tolerance   Activity Tolerance Patient limited by fatigue;Patient tolerated treatment well   Exercises   Neuro re-ed seated quad set 2x 10 with manual feedback, AAROM hip abd/add, hip flexion, knee extension; x20 PROM ankle DF/PF   Balance training  weight shifting laterally in standing with assist x 2 and UE support on hemiwalker x 10 reps;  TKE AROM in standing R LE x 10   Equipment Use   Comments R LE MMT:  hip 1/5, knee 1/-2/5, ankle 0/5   Assessment   Prognosis Good   Problem List Decreased strength;Decreased endurance; Impaired balance;Decreased mobility; Decreased coordination   Assessment Pt presents with minimal active movement in R hemibody limiting pt's ability to transfer and take steps  Encouraged pt to perform quad sets, and as much AROM hip abd/add/flex as possible when sitting and supine  Pt may benefit from sliding board or sit pivot transfer to good side for safety until R hemibody strength/function improves     Plan Treatment/Interventions ADL retraining;Functional transfer training;LE strengthening/ROM; Therapeutic exercise;Gait training;Bed mobility; Equipment eval/education;Patient/family training   Progress Progressing toward goals   PT Frequency Once a day   Recommendation   Recommendation Short-term skilled PT   Licensure   [place] License Number  Porfirio Bardales PT [id number]

## 2019-08-02 NOTE — OCCUPATIONAL THERAPY NOTE
OT TREATMENT       08/02/19 1510   Restrictions/Precautions   Other Precautions Chair Alarm; Bed Alarm; Fall Risk  (R hemiparesis)   General   Family/Caregiver Present spouse   Pain Assessment   Pain Assessment No/denies pain   Functional Standing Tolerance   Time 2 minute static standing   Activity static standing and weight shifting with mod assist of 2 with hemiwalker and assist of PT for safety due to patients significant weakness and assistance required  Comments hemiwalker, gait belt    Transfers   Sit to Stand 3  Moderate assistance   Additional items Assist x 1   Stand to Sit 3  Moderate assistance   Additional items Assist x 1   Functional Mobility   Functional Mobility   (weight shifting only, unable to take a step )   ROM- Right Upper Extremities   R Shoulder PROM; Flexion; Horizontal ABduction;ABduction   R Elbow PROM;Elbow flexion;Elbow extension   R Wrist PROM; Wrist extension;Wrist flexion   R Hand PROM; Thumb; Long finger; Index finger;Ring finger;Little finger   R Weight/Reps/Sets 10 times each seated in chair   RUE ROM Comment pt instructed in self ROM for elbow flexion, shoulder flexion and finger extension  Additional Activities   Additional Activities Comments pt with decreased light touch in R arm/hand    Assessment   Assessment Patient is cooperative and plesasant  Patient unable to completed AROM R hand today  Instructed pt on self ROM, pt and wife receptive and demonstrated understanding  Plan   Treatment Interventions Functional transfer training;UE strengthening/ROM; Endurance training;Patient/family training;Equipment evaluation/education; Activityengagement   Recommendation   OT Discharge Recommendation [id number] [facility] License Number  Sophie Dumont [facility] [address] OTR/L [id number]

## 2019-08-02 NOTE — PLAN OF CARE
Problem: Prexisting or High Potential for Compromised Skin Integrity  Goal: Skin integrity is maintained or improved  Description  INTERVENTIONS:  - Identify patients at risk for skin breakdown  - Assess and monitor skin integrity  - Assess and monitor nutrition and hydration status  - Monitor labs (i e  albumin)  - Assess for incontinence   - Turn and reposition patient  - Assist with mobility/ambulation  - Relieve pressure over bony prominences  - Avoid friction and shearing  - Provide appropriate hygiene as needed including keeping skin clean and dry  - Evaluate need for skin moisturizer/barrier cream  - Collaborate with interdisciplinary team (i e  Nutrition, Rehabilitation, etc )   - Patient/family teaching  Outcome: Progressing     Problem: NEUROSENSORY - ADULT  Goal: Achieves stable or improved neurological status  Description  INTERVENTIONS  - Monitor and report changes in neurological status  - Initiate measures to prevent increased intracranial pressure  - Maintain blood pressure and fluid volume within ordered parameters to optimize cerebral perfusion  - Monitor temperature, glucose, and sodium or any other associated labs   Initiate appropriate interventions as ordered  - Monitor for seizure activity   - Administer anti-seizure medications as ordered  Outcome: Progressing  Goal: Achieves maximal functionality and self care  Description  INTERVENTIONS  - Monitor swallowing and airway patency with patient fatigue and changes in neurological status  - Encourage and assist patient to increase activity and self care with guidance from rehab services  - Encourage visually impaired, hearing impaired and aphasic patients to use assistive/communication devices  Outcome: Progressing     Problem: MUSCULOSKELETAL - ADULT  Goal: Maintain or return mobility to safest level of function  Description  INTERVENTIONS:  - Assess patient's ability to carry out ADLs; assess patient's baseline for ADL function and identify physical deficits which impact ability to perform ADLs (bathing, care of mouth/teeth, toileting, grooming, dressing, etc )  - Assess/evaluate cause of self-care deficits   - Assess range of motion  - Assess patient's mobility; develop plan if impaired  - Assess patient's need for assistive devices and provide as appropriate  - Encourage maximum independence but intervene and supervise when necessary  - Involve family in performance of ADLs  - Assess for home care needs following discharge   - Request OT consult to assist with ADL evaluation and planning for discharge  - Provide patient education as appropriate  Outcome: Progressing  Goal: Maintain proper alignment of affected body part  Description  INTERVENTIONS:  - Support, maintain and protect limb and body alignment  - Provide pt/fam with appropriate education  Outcome: Progressing     Problem: CARDIOVASCULAR - ADULT  Goal: Maintains optimal cardiac output and hemodynamic stability  Description  INTERVENTIONS:  - Monitor I/O, vital signs and rhythm  - Monitor for S/S and trends of decreased cardiac output i e  bleeding, hypotension  - Administer and titrate ordered vasoactive medications to optimize hemodynamic stability  - Assess quality of pulses, skin color and temperature  - Assess for signs of decreased coronary artery perfusion - ex  Angina  - Instruct patient to report change in severity of symptoms  Outcome: Progressing     Problem: Potential for Falls  Goal: Patient will remain free of falls  Description  INTERVENTIONS:  - Assess patient frequently for physical needs  -  Identify cognitive and physical deficits and behaviors that affect risk of falls    -  Baring fall precautions as indicated by assessment   - Educate patient/family on patient safety including physical limitations  - Instruct patient to call for assistance with activity based on assessment  - Modify environment to reduce risk of injury  - Consider OT/PT consult to assist with strengthening/mobility  Outcome: Progressing     Problem: Neurological Deficit  Goal: Neurological status is stable or improving  Description  Interventions:  - Monitor and assess patient's level of consciousness, motor function, sensory function, and level of assistance needed for ADLs  - Monitor and report changes from baseline  Collaborate with interdisciplinary team to initiate plan and implement interventions as ordered  - Provide and maintain a safe environment  - Utilize seizure precautions  - Utilize fall precautions  - Utilize aspiration precautions  - Utilize bleeding precautions  Outcome: Progressing     Problem: Activity Intolerance/Impaired Mobility  Goal: Mobility/activity is maintained at optimum level for patient  Description  Interventions:  - Assess and monitor patient  barriers to mobility and need for assistive/adaptive devices  - Assess patient's emotional response to limitations  - Collaborate with interdisciplinary team and initiate plans and interventions as ordered  - Encourage independent activity per ability   - Maintain proper body alignment  - Perform active/passive rom as tolerated/ordered  - Plan activities to conserve energy   - Turn patient  Outcome: Progressing     Problem: Communication Impairment  Goal: Ability to express needs and understand communication  Description  Assess patient's communication skills and ability to understand information  Patient will demonstrate use of effective communication techniques, alternative methods of communication and understanding even if not able to speak  - Encourage communication and provide alternate methods of communication as needed  - Collaborate with case management/ for discharge needs  - Include patient/family/caregiver in decisions related to communication    Outcome: Progressing     Problem: Potential for Aspiration  Goal: Non-ventilated patient's risk of aspiration is minimized  Description  Assess and monitor vital signs, respiratory status, and labs (WBC)  Monitor for signs of aspiration (tachypnea, cough, rales, wheezing, cyanosis, fever)  - Assess and monitor patient's ability to swallow  - Place patient up in chair to eat if possible  - HOB up at 90 degrees to eat if unable to get patient up into chair   - Supervise patient during oral intake  - Instruct patient to take small bites  - Instruct patient to take small single sips when taking liquids  - Follow patient-specific strategies generated by speech pathologist   Outcome: Progressing     Problem: Nutrition/Hydration-ADULT  Goal: Nutrient/Hydration intake appropriate for improving, restoring or maintaining nutritional needs  Description  Monitor and assess patient's nutrition/hydration status for malnutrition (ex- brittle hair, bruises, dry skin, pale skin and conjunctiva, muscle wasting, smooth red tongue, and disorientation)  Collaborate with interdisciplinary team and initiate plan and interventions as ordered  Monitor patient's weight and dietary intake as ordered or per policy  Utilize nutrition screening tool and intervene per policy  Determine patient's food preferences and provide high-protein, high-caloric foods as appropriate       INTERVENTIONS:  - Monitor oral intake, urinary output, labs, and treatment plans  - Assess nutrition and hydration status and recommend course of action  - Evaluate amount of meals eaten  - Assist patient with eating if necessary   - Allow adequate time for meals  - Recommend/ encourage appropriate diets, oral nutritional supplements, and vitamin/mineral supplements  - Order, calculate, and assess calorie counts as needed  - Recommend, monitor, and adjust tube feedings and TPN/PPN based on assessed needs  - Assess need for intravenous fluids  - Provide specific nutrition/hydration education as appropriate  - Include patient/family/caregiver in decisions related to nutrition  Outcome: Progressing

## 2019-08-02 NOTE — UTILIZATION REVIEW
Continued Stay Review    Date: 8/2/2019                         Current Patient Class: inpatient   Current Level of Care: med surg     HPI:64 y o  male initially admitted on 7/31/2019 inpatient due to  Acute ischemic CVA/hypertensive emergency  Not a candidate for tPA given duration of symptoms before presentation  Patient has noted  right sided weakness  Hypertensive to 260/127 and treated with labetalol and nicardipine  On 8/1 nicardipine weaned to off  BP control in progress    Assessment/Plan: Today 8/2  Patient with acute CVA involving left sided infarct of coronary radiata and chronic right basal ganglia infarct with most likely etiology of hypertension  Per cardiology has non st elevation MI secondary to  high BP and mitchel  Has persistent right hand weakness and BP control in progress, has used IV hydralaize to control in addition to scheduled medication    Stress test in process      Pertinent Labs/Diagnostic Results:   Results from last 7 days   Lab Units 08/02/19  0514 08/01/19  0530 07/31/19  1324   WBC Thousand/uL 8 35 8 96 7 16   HEMOGLOBIN g/dL 17 2* 16 3 16 4   HEMATOCRIT % 52 1* 48 6 50 2*   PLATELETS Thousands/uL 241 229 221   NEUTROS ABS Thousands/µL 4 06 5 35  --          Results from last 7 days   Lab Units 08/02/19  0514 08/01/19  0530 07/31/19  1324   SODIUM mmol/L 139 140 140   POTASSIUM mmol/L 4 0 3 7 3 7   CHLORIDE mmol/L 103 103 103   CO2 mmol/L 29 26 26   ANION GAP mmol/L 7 11 11   BUN mg/dL 18 13 13   CREATININE mg/dL 1 53* 1 43* 1 41*   EGFR ml/min/1 73sq m 56 60 61   CALCIUM mg/dL 8 9 9 0 8 9   MAGNESIUM mg/dL  --  2 1  --    PHOSPHORUS mg/dL  --  3 5  --      Results from last 7 days   Lab Units 07/31/19  1324   AST U/L 29   ALT U/L 50   ALK PHOS U/L 79   TOTAL PROTEIN g/dL 7 8   ALBUMIN g/dL 3 9   TOTAL BILIRUBIN mg/dL 0 80   BILIRUBIN DIRECT mg/dL 0 20     Results from last 7 days   Lab Units 07/31/19  1314   POC GLUCOSE mg/dl 91     Results from last 7 days   Lab Units 08/02/19  0514 08/01/19  0530 07/31/19  1324   GLUCOSE RANDOM mg/dL 98 96 96     Results from last 7 days   Lab Units 08/01/19  0530   HEMOGLOBIN A1C % 5 8   EAG mg/dl 120     Results from last 7 days   Lab Units 07/31/19  1557 07/31/19  1324   TROPONIN I ng/mL 0 30* 0 32*     Results from last 7 days   Lab Units 07/31/19  1324   PROTIME seconds 11 4   INR  1 09   PTT seconds 29     Results from last 7 days   Lab Units 08/01/19  0530   TSH 3RD GENERATON uIU/mL 1 244       Vital Signs:   08/02/19 1200    70  21  165/92  125  97 %       08/02/19 1008    63  15  184/94Abnormal   132  98 %       08/02/19 0800    60  15  153/79  109  96 %       08/02/19 0400  97 5 °F (36 4 °C)  56  12  165/62  133  97 %       08/02/19 0300    64  0Abnormal   148/84  105  95 %       08/02/19 0200    62  13  165/62  118  96 %    Lying   08/02/19 0100    63  12  158/96  122  96 %       08/02/19 0000  97 8 °F (36 6 °C)  65  17  168/81  116  95 %           Medications:   Scheduled Meds:   Current Facility-Administered Medications:  acetaminophen 650 mg Oral Q6H PRN   amLODIPine 10 mg Oral HS   aspirin 81 mg Oral Daily   atorvastatin 80 mg Oral Daily With Dinner   clopidogrel 75 mg Oral Daily   enoxaparin 40 mg Subcutaneous Daily   hydrALAZINE - used x 1 (1009) 10 mg Intravenous Q4H PRN   hydrALAZINE 25 mg Oral Q8H [address]   losartan 100 mg Oral HS   nebivolol 20 mg Oral HS       Discharge Plan: to be determined    Network Utilization Review Department  Phone: [phone]; Fax [phone]  [email or web address]  ATTENTION: Please call with any questions or concerns to [phone]  and carefully listen to the prompts so that you are directed to the right person  Send all requests for admission clinical reviews, approved or denied determinations and any other requests to fax [phone]   All voicemails are confidential

## 2019-08-02 NOTE — PROGRESS NOTES
[facility] Internal Medicine Progress Note  Patient: Howie Leal 60 y o  male   MRN: [id number]  PCP: Alicia Silva MD  Unit/Bed#: ICU 02 Encounter: [id number]  Date Of Visit: 08/02/19    Problem List:    Principal Problem:    Lacunar infarct, acute [facility])  Active Problems:    Hypertensive emergency    Type 2 MI    Acute kidney injury ([facility] )    HLD (hyperlipidemia)    Abnormal MRI    Abnormal finding on CT scan      Assessment & Plan:    * Lacunar infarct, acute [facility])  Assessment & Plan  Patient presented with worsening right-sided weakness to ED, symptoms actually started evening earlier but patient did not present at that time  CT head and CTA of the head and neck without any acute abnormality or large vessel occlusion  NIHSS on presentation at least 5  Not a candidate for tPA due to out of window  Initially was admitted to ICU due to hypertensive emergency requiring Cardene drip  Patient reports history of long-term uncontrolled hypertension, likely etiology  · MRI of the brain- Acute appearing lacunar infarct at the posterior left corona radiata  · 2D echo-EF 20-94%, grade 1 diastolic dysfunction, negative bubble study, PA pressure 35  · LDL -130  · Hemoglobin A1c is 5 8  · Neurology input appreciated  · Dual antiplatelet aspirin and Plavix for at least 90 days, atorvastatin 80 mg daily  · PT/OT/ST  · Optimize blood pressure control  · Neurology follow-up    Acute kidney injury ([facility] )  Assessment & Plan  No prior baseline available but no history of renal insufficiency as per family  Creatinine 1 4 1 on presentation, slightly higher at 1 53 today  Status post CTA with contrast  Denies any urinary complaints  Will check UA, bladder scan  Obtain prior lab results from PMD  Monitor intake output  Hold nephrotoxins      Type 2 MI  Assessment & Plan  Presented with elevated troponin in setting of acute CVA  EKG normal sinus rhythm, LVH strain pattern  Troponin 0 32-0 30  Cardiology input noted  Patient is scheduled for stress test today  Continue medical management    Hypertensive emergency  Assessment & Plan  In setting of acute CVA with progressive symptoms  Initially admitted to ICU , continued on Cardene drip  Reports history of uncontrolled hypertension  · Resumed on Bystolic, losartan, hydralazine, amlodipine with improvement in blood pressure  · Cardiac drip was discontinued  · Follow-up renal duplex  · Monitor blood pressure  · Cardiology following    Abnormal MRI  Assessment & Plan  Moderate periventricular and subcortical foci of white matter T2 hyperintensity which is nonspecific and most likely related to chronic small vessel ischemic changes  A few of these white matter lesions when perpendicular to the callosal septal interface raising the possibility of demyelinating disease  Other less likely etiologies include vasculitis, Lyme and migraines  Neurology input appreciated, likely all changes related to chronic small vessel ischemic changes  Follow up with Neurology after discharge    HLD (hyperlipidemia)  Assessment & Plan    Atorvastatin 80 mg daily        VTE Pharmacologic Prophylaxis:   Pharmacologic: Enoxaparin (Lovenox)  Mechanical VTE Prophylaxis in Place: Yes    Patient Centered Rounds: I have performed bedside rounds with nursing staff today  Discussions with Specialists or Other Care Team Provider:  Dr Kang Cottrell and Dr Nadia Bee    Education and Discussions with Family / Patient:  Yes, family at bedside    Time Spent for Care: 45 minutes  More than 50% of total time spent on counseling and coordination of care as described above      Current Length of Stay: 2 day(s)    Current Patient Status: Inpatient   Certification Statement: The patient will continue to require additional inpatient hospital stay due to CVA, uncontrolled hypertension    Discharge Plan:  Anticipate rehab, acute    Code Status: Level 1 - Full Code      Subjective:   Stable right-sided weakness, upper extremity more than lower extremity  Speech is at baseline as per wife  Denies any headache or dizziness  Denies chest pain shortness of breath  Awaiting stress test today    Objective:   Comfortable  Vitals:   Temp (24hrs), Av 9 °F (36 6 °C), Min:97 5 °F (36 4 °C), Max:98 6 °F (37 °C)    Temp:  [97 5 °F (36 4 °C)-98 6 °F (37 °C)] 97 8 °F (36 6 °C)  HR:  [56-78] 63  Resp:  [0-28] 15  BP: (139-207)/() 184/94  SpO2:  [95 %-98 %] 98 %  Body mass index is 27 86 kg/m²  Input and Output Summary (last 24 hours): Intake/Output Summary (Last 24 hours) at 2019 1008  Last data filed at 2019 0501  Gross per 24 hour   Intake 600 ml   Output 1700 ml   Net -1100 ml       Physical Exam:     Physical Exam   Constitutional: He is oriented to person, place, and time  He appears well-developed  No distress  HENT:   Head: Normocephalic and atraumatic  Eyes: Pupils are equal, round, and reactive to light  Conjunctivae are normal    Neck: Normal range of motion  Neck supple  Cardiovascular: Normal rate, regular rhythm and normal heart sounds  Pulmonary/Chest: Effort normal  No respiratory distress  He has no wheezes  He has no rhonchi  He has no rales  He exhibits no tenderness  Abdominal: Soft  Bowel sounds are normal  He exhibits no distension  There is no tenderness  There is no rebound and no guarding  Musculoskeletal: He exhibits no edema  Neurological: He is alert and oriented to person, place, and time  No cranial nerve deficit or sensory deficit  GCS eye subscore is 4  GCS verbal subscore is 5  GCS motor subscore is 6  Right-sided weakness, unchanged from prior  No activity against gravity    Skin: Skin is warm and dry  No rash noted  Psychiatric: He has a normal mood and affect         Additional Data:     Labs:    Results from last 7 days   Lab Units 19  0514   WBC Thousand/uL 8 35   HEMOGLOBIN g/dL 17 2*   HEMATOCRIT % 52 1*   PLATELETS Thousands/uL 241   NEUTROS PCT % 49   LYMPHS PCT % 42   MONOS PCT % 7   EOS PCT % 1     Results from last 7 days   Lab Units 08/02/19  0514  07/31/19  1324   POTASSIUM mmol/L 4 0   < > 3 7   CHLORIDE mmol/L 103   < > 103   CO2 mmol/L 29   < > 26   BUN mg/dL 18   < > 13   CREATININE mg/dL 1 53*   < > 1 41*   CALCIUM mg/dL 8 9   < > 8 9   ALK PHOS U/L  --   --  79   ALT U/L  --   --  50   AST U/L  --   --  29    < > = values in this interval not displayed  Results from last 7 days   Lab Units 07/31/19  1324   INR  1 09       * I Have Reviewed All Lab Data Listed Above  * Additional Pertinent Lab Tests Reviewed: [facility] Admission Reviewed      Imaging:  Imaging Reports Reviewed Today Include:  CT scan    Recent Cultures (last 7 days):           Last 24 Hours Medication List:     Current Facility-Administered Medications:  acetaminophen 650 mg Oral Q6H PRN Beatris Nesbitt MD   amLODIPine 10 mg Oral HS Felisha Agarwal PA-C   aspirin 81 mg Oral Daily Felisha Agarwal PA-C   atorvastatin 80 mg Oral Daily With KeySBELLO chin   clopidogrel 75 mg Oral Daily Екатерина Titus MD   enoxaparin 40 mg Subcutaneous Daily Felisha Agarwal PA-C   hydrALAZINE 10 mg Intravenous Q4H PRN Felisha Agarwal PA-C   hydrALAZINE 25 mg Oral Q8H [facility] Felisha Agarwal PA-C   losartan 100 mg Oral HS Felisha Agarwal PA-C   nebivolol 20 mg Oral HS Felisha Agarwal PA-C          Today, Patient Was Seen By: Beatris Nesbitt MD    ** Please Note: "This note has been constructed using a voice recognition system  Therefore there may be syntax, spelling, and/or grammatical errors   Please call if you have any questions  "**

## 2019-08-02 NOTE — OCCUPATIONAL THERAPY NOTE
OCCUPATIONAL THERAPY    OT canceled today due to testing  Will continue to follow  Marilyn Race   Ede Giss[address] 89, OTR/L, [place] Lic# [id number]

## 2019-08-02 NOTE — UTILIZATION REVIEW
Continued Stay Review  ADDITIONAL INFORMATION PER REQUEST  Date: 8/1/2019                         Current Patient Class: inpatient   Current Level of Care: med surg     HPI:64 y o  male initially admitted on 7/31/2019 inpatient due to  Acute ischemic CVA/hypertensive emergency  Not a candidate for tPA given duration of symptoms before presentation  Patient has noted  right sided weakness  Hypertensive to 260/127 and treated with labetalol and nicardipine  Assessment/Plan: Neurology in consult and have seen patient on 8/1  Per neurology: patient has acute ischemic left sided BG infarction with uncontrolled hypertension  Patient has more weakness than expected with size of stoke likely from chronic uncontrolled hypertension  Dual antiplatelet therapy with asa and plavix in progress  Patient has been weaned from nicardipine infusion, losartan and amlodipine continued with addition of hydralazine   Pertinent Labs/Diagnostic Results:   8/1/2019- ECHO- LEFT VENTRICLE:Systolic function was normal  Ejection fraction was estimated in the range of 60 % to 65 % to be 65 %  There were no regional wall motion abnormalities  Wall thickness was moderately increased  There was severe concentric hypertrophy  Doppler parameters were consistent with abnormal left ventricular relaxation (grade 1 diastolic dysfunction)  Doppler parameters were consistent with high ventricular filling pressure  LEFT ATRIUM:The atrium was mildly dilated  ATRIAL SEPTUM:No defect or patent foramen ovale was identified by bubble study    MITRAL VALVE:There was trace regurgitation  TRICUSPID VALVE:There was mild regurgitation  Estimated peak PA pressure was 35 mmHg  IVC, HEPATIC VEINS:The respirophasic change in diameter was more than 50%  7/31/2019 MRI brain - Acute appearing lacunar infarct at the posterior left corona radiata    Moderate periventricular and subcortical foci of white matter T2 hyperintensity which is nonspecific and most likely related to chronic small vessel ischemic changes   A few of these white matter lesions when perpendicular to the callosal septal   interface raising the possibility of demyelinating disease   Other less likely etiologies include vasculitis, Lyme and migraines  Old right basal ganglia lacunar infarct    7/31/2019 EKG- Normal sinus rhythm  Left ventricular hypertrophy with repolarization abnormality  Abnormal ECG  When compared with ECG of 31-JUL-2019 13:30, (unconfirmed)  No significant change was found    7/31/2019 CxR- No acute cardiopulmonary disease  7/31/2019 CTA head and neck - No hemodynamically significant stenosis in the major arteries of the neck  2   No intracranial aneurysm or major intracranial arterial stenosis  3   Frothy soft tissue abnormality in the nondependent portion of the trachea with imaging features favoring aspiration   Differential diagnosis could include tracheal polyp or other soft tissue lesion  4   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    7/31/2019  CT brain - No acute intracranial hemorrhage, mass effect or edema   Microangiopathic changes   Chronic appearing right basal ganglia lacunar infarctions    Results from last 7 days   Lab Units 08/01/19  0530 07/31/19  1324   WBC Thousand/uL 8 96 7 16   HEMOGLOBIN g/dL 16 3 16 4   HEMATOCRIT % 48 6 50 2*   PLATELETS Thousands/uL 229 221   NEUTROS ABS Thousands/µL 5 35  --          Results from last 7 days   Lab Units 08/01/19  0530 07/31/19  1324   SODIUM mmol/L 140 140   POTASSIUM mmol/L 3 7 3 7   CHLORIDE mmol/L 103 103   CO2 mmol/L 26 26   ANION GAP mmol/L 11 11   BUN mg/dL 13 13   CREATININE mg/dL 1 43* 1 41*   EGFR ml/min/1 73sq m 60 61   CALCIUM mg/dL 9 0 8 9   MAGNESIUM mg/dL 2 1  --    PHOSPHORUS mg/dL 3 5  --      Results from last 7 days   Lab Units 07/31/19  1324   AST U/L 29   ALT U/L 50   ALK PHOS U/L 79   TOTAL PROTEIN g/dL 7 8   ALBUMIN g/dL 3 9 TOTAL BILIRUBIN mg/dL 0 80   BILIRUBIN DIRECT mg/dL 0 20     Results from last 7 days   Lab Units 07/31/19  1314   POC GLUCOSE mg/dl 91     Results from last 7 days   Lab Units 08/01/19  0530 07/31/19  1324   GLUCOSE RANDOM mg/dL 96 96     Results from last 7 days   Lab Units 08/01/19  0530   HEMOGLOBIN A1C % 5 8   EAG mg/dl 120     Results from last 7 days   Lab Units 07/31/19  1557 07/31/19  1324   TROPONIN I ng/mL 0 30* 0 32*     Results from last 7 days   Lab Units 07/31/19  1324   PROTIME seconds 11 4   INR  1 09   PTT seconds 29     Results from last 7 days   Lab Units 08/01/19  0530   TSH 3RD GENERATON uIU/mL 1 244       Vital Signs:   08/01/19 2300    66  14  154/77  108  95 %       08/01/19 2226    68  16  170/81    96 %       08/01/19 2200    67  17  181/84Abnormal   121  96 %       08/01/19 2100    68  17  176/82Abnormal   116  97 %       08/01/19 2000    71  19  177/81Abnormal   116  96 %       08/01/19 1900    74  18  165/76  109  96 %       08/01/19 1800    78  18  171/77Abnormal   110  96 %       08/01/19 1700  98 6 °F (37 °C)  72  17  207/109Abnormal   150  96 %  None (Room air)  Sitting   08/01/19 1600    66  16  185/98Abnormal   135  97 %       08/01/19 1500    63  18  188/94Abnormal   130  97 %       08/01/19 1400    68  28Abnormal   182/99Abnormal   134  97 %       08/01/19 1350    69  20  139/86  108  97 %       08/01/19 1055  97 8 °F (36 6 °C)                 08/01/19 1000    72  25Abnormal   195/108Abnormal   143  96 %       08/01/19 0900    67  17  171/84Abnormal   121  97 %    Sitting   08/01/19 0800    62  12  183/104Abnormal   137  97 %  None (Room air)  Sitting   08/01/19 0700    59  21  176/103Abnormal   135  97 %       08/01/19 0600    57  15  164/86  118  95 %       08/01/19 0500    67  16  156/88  115  96 %       08/01/19 0400  97 °F (36 1 °C)Abnormal   63  12  155/78  107  95 %       08/01/19 0300    63  15  133/75  98  95 %       08/01/19 0200    63  14  126/72  93  95 %       08/01/19 0100    72  15  149/76  103  95 %       08/01/19 0000  97 5 °F (36 4 °C)  79  24Abnormal   153/88  112  96 %           Medications:   Scheduled Meds:   Current Facility-Administered Medications:  acetaminophen 650 mg Oral Q6H PRN   amLODIPine 10 mg Oral HS   aspirin 81 mg Oral Daily   atorvastatin 80 mg Oral Daily With Dinner   clopidogrel 75 mg Oral Daily   enoxaparin 40 mg Subcutaneous Daily   hydrALAZINE - used x 1 at 1741 10 mg Intravenous Q4H PRN   hydrALAZINE 25 mg Oral Q8H [address]   losartan 100 mg Oral HS   nebivolol 20 mg Oral HS   niCARdipine (CARDENE) 25 mg (STANDARD CONCENTRATION) in sodium chloride 0 9% 250 mL   Rate:  mL/hr Dose: 1-15 mg/hr  Freq: Titrated Route: IV  Last Dose: Stopped (07/31/19 5983)  Start: 07/31/19 1600 End: 08/01/19 9331    Discharge Plan: to be determined  Network Utilization Review Department  Phone: [phone]; Fax [phone]  [email or web address]  ATTENTION: Please call with any questions or concerns to [phone]  and carefully listen to the prompts so that you are directed to the right person  Send all requests for admission clinical reviews, approved or denied determinations and any other requests to fax [phone]   All voicemails are confidential

## 2019-08-03 PROBLEM — R50.9 LOW GRADE FEVER: Status: ACTIVE | Noted: 2019-08-03

## 2019-08-03 PROBLEM — N18.30 STAGE 3 CHRONIC KIDNEY DISEASE (HCC): Status: ACTIVE | Noted: 2019-08-03

## 2019-08-03 PROBLEM — N17.9 ACUTE KIDNEY INJURY (HCC): Status: ACTIVE | Noted: 2019-08-03

## 2019-08-03 PROBLEM — R33.9 URINARY RETENTION: Status: ACTIVE | Noted: 2019-08-03

## 2019-08-03 PROBLEM — R93.89 ABNORMAL FINDING ON CT SCAN: Status: ACTIVE | Noted: 2019-08-03

## 2019-08-03 PROBLEM — R93.89 ABNORMAL MRI: Status: ACTIVE | Noted: 2019-08-03

## 2019-08-03 LAB
ANION GAP SERPL CALCULATED.3IONS-SCNC: 11 MMOL/L (ref 4–13)
BASOPHILS # BLD AUTO: 0.05 THOUSANDS/ΜL (ref 0–0.1)
BASOPHILS NFR BLD AUTO: 1 % (ref 0–1)
BUN SERPL-MCNC: 16 MG/DL (ref 5–25)
CALCIUM SERPL-MCNC: 8.5 MG/DL (ref 8.3–10.1)
CHLORIDE SERPL-SCNC: 105 MMOL/L (ref 100–108)
CO2 SERPL-SCNC: 22 MMOL/L (ref 21–32)
CREAT SERPL-MCNC: 1.44 MG/DL (ref 0.6–1.3)
EOSINOPHIL # BLD AUTO: 0.05 THOUSAND/ΜL (ref 0–0.61)
EOSINOPHIL NFR BLD AUTO: 1 % (ref 0–6)
ERYTHROCYTE [DISTWIDTH] IN BLOOD BY AUTOMATED COUNT: 14.3 % (ref 11.6–15.1)
GFR SERPL CREATININE-BSD FRML MDRD: 60 ML/MIN/1.73SQ M
GLUCOSE SERPL-MCNC: 99 MG/DL (ref 65–140)
HCT VFR BLD AUTO: 47 % (ref 36.5–49.3)
HGB BLD-MCNC: 15.6 G/DL (ref 12–17)
IMM GRANULOCYTES # BLD AUTO: 0.02 THOUSAND/UL (ref 0–0.2)
IMM GRANULOCYTES NFR BLD AUTO: 0 % (ref 0–2)
LYMPHOCYTES # BLD AUTO: 1.82 THOUSANDS/ΜL (ref 0.6–4.47)
LYMPHOCYTES NFR BLD AUTO: 28 % (ref 14–44)
MCH RBC QN AUTO: 31.1 PG (ref 26.8–34.3)
MCHC RBC AUTO-ENTMCNC: 33.2 G/DL (ref 31.4–37.4)
MCV RBC AUTO: 94 FL (ref 82–98)
MONOCYTES # BLD AUTO: 0.61 THOUSAND/ΜL (ref 0.17–1.22)
MONOCYTES NFR BLD AUTO: 9 % (ref 4–12)
MRSA NOSE QL CULT: NORMAL
NEUTROPHILS # BLD AUTO: 3.96 THOUSANDS/ΜL (ref 1.85–7.62)
NEUTS SEG NFR BLD AUTO: 61 % (ref 43–75)
NRBC BLD AUTO-RTO: 0 /100 WBCS
PLATELET # BLD AUTO: 217 THOUSANDS/UL (ref 149–390)
PMV BLD AUTO: 12.1 FL (ref 8.9–12.7)
POTASSIUM SERPL-SCNC: 3.4 MMOL/L (ref 3.5–5.3)
RBC # BLD AUTO: 5.02 MILLION/UL (ref 3.88–5.62)
SODIUM SERPL-SCNC: 138 MMOL/L (ref 136–145)
WBC # BLD AUTO: 6.51 THOUSAND/UL (ref 4.31–10.16)

## 2019-08-03 PROCEDURE — 80048 BASIC METABOLIC PNL TOTAL CA: CPT | Performed by: INTERNAL MEDICINE

## 2019-08-03 PROCEDURE — 97530 THERAPEUTIC ACTIVITIES: CPT

## 2019-08-03 PROCEDURE — 99232 SBSQ HOSP IP/OBS MODERATE 35: CPT | Performed by: INTERNAL MEDICINE

## 2019-08-03 PROCEDURE — 93018 CV STRESS TEST I&R ONLY: CPT | Performed by: INTERNAL MEDICINE

## 2019-08-03 PROCEDURE — 85025 COMPLETE CBC W/AUTO DIFF WBC: CPT | Performed by: INTERNAL MEDICINE

## 2019-08-03 PROCEDURE — 93016 CV STRESS TEST SUPVJ ONLY: CPT | Performed by: INTERNAL MEDICINE

## 2019-08-03 PROCEDURE — 97110 THERAPEUTIC EXERCISES: CPT

## 2019-08-03 PROCEDURE — 78452 HT MUSCLE IMAGE SPECT MULT: CPT | Performed by: INTERNAL MEDICINE

## 2019-08-03 RX ORDER — DOXAZOSIN 2 MG/1
2 TABLET ORAL
Status: DISCONTINUED | OUTPATIENT
Start: 2019-08-04 | End: 2019-08-04

## 2019-08-03 RX ORDER — AMLODIPINE BESYLATE 10 MG/1
10 TABLET ORAL DAILY
Status: DISCONTINUED | OUTPATIENT
Start: 2019-08-04 | End: 2019-08-04

## 2019-08-03 RX ORDER — HYDRALAZINE HYDROCHLORIDE 25 MG/1
50 TABLET, FILM COATED ORAL EVERY 8 HOURS SCHEDULED
Status: DISCONTINUED | OUTPATIENT
Start: 2019-08-03 | End: 2019-08-04

## 2019-08-03 RX ORDER — POTASSIUM CHLORIDE 20 MEQ/1
20 TABLET, EXTENDED RELEASE ORAL ONCE
Status: COMPLETED | OUTPATIENT
Start: 2019-08-03 | End: 2019-08-03

## 2019-08-03 RX ORDER — LOSARTAN POTASSIUM 50 MG/1
100 TABLET ORAL DAILY
Status: DISCONTINUED | OUTPATIENT
Start: 2019-08-04 | End: 2019-08-04

## 2019-08-03 RX ORDER — BISACODYL 10 MG
10 SUPPOSITORY, RECTAL RECTAL ONCE AS NEEDED
Status: DISCONTINUED | OUTPATIENT
Start: 2019-08-03 | End: 2019-08-03

## 2019-08-03 RX ORDER — DOCUSATE SODIUM 100 MG/1
100 CAPSULE, LIQUID FILLED ORAL 2 TIMES DAILY
Status: DISCONTINUED | OUTPATIENT
Start: 2019-08-03 | End: 2019-08-06 | Stop reason: HOSPADM

## 2019-08-03 RX ORDER — POLYETHYLENE GLYCOL 3350 17 G/17G
17 POWDER, FOR SOLUTION ORAL DAILY PRN
Status: DISCONTINUED | OUTPATIENT
Start: 2019-08-03 | End: 2019-08-05

## 2019-08-03 RX ADMIN — HYDRALAZINE HYDROCHLORIDE 25 MG: 25 TABLET ORAL at 05:13

## 2019-08-03 RX ADMIN — SODIUM CHLORIDE 75 ML/HR: 0.9 INJECTION, SOLUTION INTRAVENOUS at 03:25

## 2019-08-03 RX ADMIN — DOCUSATE SODIUM 100 MG: 100 CAPSULE, LIQUID FILLED ORAL at 18:22

## 2019-08-03 RX ADMIN — POTASSIUM CHLORIDE 20 MEQ: 1500 TABLET, EXTENDED RELEASE ORAL at 10:08

## 2019-08-03 RX ADMIN — AMLODIPINE BESYLATE 10 MG: 10 TABLET ORAL at 21:12

## 2019-08-03 RX ADMIN — LOSARTAN POTASSIUM 100 MG: 50 TABLET ORAL at 21:12

## 2019-08-03 RX ADMIN — DOCUSATE SODIUM 100 MG: 100 CAPSULE, LIQUID FILLED ORAL at 11:22

## 2019-08-03 RX ADMIN — NEBIVOLOL HYDROCHLORIDE 20 MG: 10 TABLET ORAL at 21:12

## 2019-08-03 RX ADMIN — POLYETHYLENE GLYCOL 3350 17 G: 17 POWDER, FOR SOLUTION ORAL at 11:22

## 2019-08-03 RX ADMIN — CLOPIDOGREL BISULFATE 75 MG: 75 TABLET ORAL at 08:34

## 2019-08-03 RX ADMIN — ENOXAPARIN SODIUM 40 MG: 40 INJECTION SUBCUTANEOUS at 08:34

## 2019-08-03 RX ADMIN — HYDRALAZINE HYDROCHLORIDE 50 MG: 25 TABLET ORAL at 13:39

## 2019-08-03 RX ADMIN — HYDRALAZINE HYDROCHLORIDE 50 MG: 25 TABLET ORAL at 21:12

## 2019-08-03 RX ADMIN — POTASSIUM CHLORIDE 20 MEQ: 1500 TABLET, EXTENDED RELEASE ORAL at 18:22

## 2019-08-03 RX ADMIN — ASPIRIN 81 MG: 81 TABLET, COATED ORAL at 08:34

## 2019-08-03 RX ADMIN — ATORVASTATIN CALCIUM 80 MG: 80 TABLET ORAL at 16:03

## 2019-08-03 NOTE — PLAN OF CARE
Problem: Prexisting or High Potential for Compromised Skin Integrity  Goal: Skin integrity is maintained or improved  Description  INTERVENTIONS:  - Identify patients at risk for skin breakdown  - Assess and monitor skin integrity  - Assess and monitor nutrition and hydration status  - Monitor labs (i e  albumin)  - Assess for incontinence   - Turn and reposition patient  - Assist with mobility/ambulation  - Relieve pressure over bony prominences  - Avoid friction and shearing  - Provide appropriate hygiene as needed including keeping skin clean and dry  - Evaluate need for skin moisturizer/barrier cream  - Collaborate with interdisciplinary team (i e  Nutrition, Rehabilitation, etc )   - Patient/family teaching  Outcome: Progressing     Problem: NEUROSENSORY - ADULT  Goal: Achieves stable or improved neurological status  Description  INTERVENTIONS  - Monitor and report changes in neurological status  - Initiate measures to prevent increased intracranial pressure  - Maintain blood pressure and fluid volume within ordered parameters to optimize cerebral perfusion  - Monitor temperature, glucose, and sodium or any other associated labs   Initiate appropriate interventions as ordered  - Monitor for seizure activity   - Administer anti-seizure medications as ordered  Outcome: Progressing  Goal: Achieves maximal functionality and self care  Description  INTERVENTIONS  - Monitor swallowing and airway patency with patient fatigue and changes in neurological status  - Encourage and assist patient to increase activity and self care with guidance from rehab services  - Encourage visually impaired, hearing impaired and aphasic patients to use assistive/communication devices  Outcome: Progressing     Problem: MUSCULOSKELETAL - ADULT  Goal: Maintain or return mobility to safest level of function  Description  INTERVENTIONS:  - Assess patient's ability to carry out ADLs; assess patient's baseline for ADL function and identify physical deficits which impact ability to perform ADLs (bathing, care of mouth/teeth, toileting, grooming, dressing, etc )  - Assess/evaluate cause of self-care deficits   - Assess range of motion  - Assess patient's mobility; develop plan if impaired  - Assess patient's need for assistive devices and provide as appropriate  - Encourage maximum independence but intervene and supervise when necessary  - Involve family in performance of ADLs  - Assess for home care needs following discharge   - Request OT consult to assist with ADL evaluation and planning for discharge  - Provide patient education as appropriate  Outcome: Progressing  Goal: Maintain proper alignment of affected body part  Description  INTERVENTIONS:  - Support, maintain and protect limb and body alignment  - Provide pt/fam with appropriate education  Outcome: Progressing     Problem: CARDIOVASCULAR - ADULT  Goal: Maintains optimal cardiac output and hemodynamic stability  Description  INTERVENTIONS:  - Monitor I/O, vital signs and rhythm  - Monitor for S/S and trends of decreased cardiac output i e  bleeding, hypotension  - Administer and titrate ordered vasoactive medications to optimize hemodynamic stability  - Assess quality of pulses, skin color and temperature  - Assess for signs of decreased coronary artery perfusion - ex  Angina  - Instruct patient to report change in severity of symptoms  Outcome: Progressing     Problem: Potential for Falls  Goal: Patient will remain free of falls  Description  INTERVENTIONS:  - Assess patient frequently for physical needs  -  Identify cognitive and physical deficits and behaviors that affect risk of falls    -  Gill fall precautions as indicated by assessment   - Educate patient/family on patient safety including physical limitations  - Instruct patient to call for assistance with activity based on assessment  - Modify environment to reduce risk of injury  - Consider OT/PT consult to assist with strengthening/mobility  Outcome: Progressing     Problem: Neurological Deficit  Goal: Neurological status is stable or improving  Description  Interventions:  - Monitor and assess patient's level of consciousness, motor function, sensory function, and level of assistance needed for ADLs  - Monitor and report changes from baseline  Collaborate with interdisciplinary team to initiate plan and implement interventions as ordered  - Provide and maintain a safe environment  - Utilize seizure precautions  - Utilize fall precautions  - Utilize aspiration precautions  - Utilize bleeding precautions  Outcome: Progressing     Problem: Activity Intolerance/Impaired Mobility  Goal: Mobility/activity is maintained at optimum level for patient  Description  Interventions:  - Assess and monitor patient  barriers to mobility and need for assistive/adaptive devices  - Assess patient's emotional response to limitations  - Collaborate with interdisciplinary team and initiate plans and interventions as ordered  - Encourage independent activity per ability   - Maintain proper body alignment  - Perform active/passive rom as tolerated/ordered  - Plan activities to conserve energy   - Turn patient  Outcome: Progressing     Problem: Communication Impairment  Goal: Ability to express needs and understand communication  Description  Assess patient's communication skills and ability to understand information  Patient will demonstrate use of effective communication techniques, alternative methods of communication and understanding even if not able to speak  - Encourage communication and provide alternate methods of communication as needed  - Collaborate with case management/ for discharge needs  - Include patient/family/caregiver in decisions related to communication    Outcome: Progressing     Problem: Potential for Aspiration  Goal: Non-ventilated patient's risk of aspiration is minimized  Description  Assess and monitor vital signs, respiratory status, and labs (WBC)  Monitor for signs of aspiration (tachypnea, cough, rales, wheezing, cyanosis, fever)  - Assess and monitor patient's ability to swallow  - Place patient up in chair to eat if possible  - HOB up at 90 degrees to eat if unable to get patient up into chair   - Supervise patient during oral intake  - Instruct patient to take small bites  - Instruct patient to take small single sips when taking liquids  - Follow patient-specific strategies generated by speech pathologist   Outcome: Progressing     Problem: Nutrition/Hydration-ADULT  Goal: Nutrient/Hydration intake appropriate for improving, restoring or maintaining nutritional needs  Description  Monitor and assess patient's nutrition/hydration status for malnutrition (ex- brittle hair, bruises, dry skin, pale skin and conjunctiva, muscle wasting, smooth red tongue, and disorientation)  Collaborate with interdisciplinary team and initiate plan and interventions as ordered  Monitor patient's weight and dietary intake as ordered or per policy  Utilize nutrition screening tool and intervene per policy  Determine patient's food preferences and provide high-protein, high-caloric foods as appropriate       INTERVENTIONS:  - Monitor oral intake, urinary output, labs, and treatment plans  - Assess nutrition and hydration status and recommend course of action  - Evaluate amount of meals eaten  - Assist patient with eating if necessary   - Allow adequate time for meals  - Recommend/ encourage appropriate diets, oral nutritional supplements, and vitamin/mineral supplements  - Order, calculate, and assess calorie counts as needed  - Recommend, monitor, and adjust tube feedings and TPN/PPN based on assessed needs  - Assess need for intravenous fluids  - Provide specific nutrition/hydration education as appropriate  - Include patient/family/caregiver in decisions related to nutrition  Outcome: Progressing

## 2019-08-03 NOTE — SOCIAL WORK
LOS - 4 days    SW following to assist with DCP  Acute rehab at [facility] is planned  Bed is available and authorization for acute rehab [facility]) obtained from [address] [place] [address] aware of authorization approval   Dr Baldemar Esposito aware as well  Pt can be transferred whenever ready  SW will follow to monitor progress and assist with transfer when ready

## 2019-08-03 NOTE — ASSESSMENT & PLAN NOTE
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

## 2019-08-03 NOTE — PROGRESS NOTES
[facility] Progress Note  Patient: Nancy Tamez 60 y o  male   MRN: [id number]  PCP: Dodie Em MD  Unit/Bed#: [address] [place] [address] Encounter: [id number]  Date Of Visit: 08/03/19    Problem List:    Principal Problem:    Lacunar infarct, acute [facility])  Active Problems:    Hypertensive emergency    Type 2 MI    Stage 3 chronic kidney disease ([place] [address] )    Low grade fever    Urinary retention    HLD (hyperlipidemia)    Abnormal MRI    Abnormal finding on CT scan      Assessment & Plan:    * Lacunar infarct, acute [facility])  Assessment & Plan  Patient presented with worsening right-sided weakness to ED, symptoms actually started evening earlier but patient did not present at that time  CT head and CTA of the head and neck without any acute abnormality or large vessel occlusion  NIHSS on presentation at least 5  Not a candidate for tPA due to out of window  Initially was admitted to ICU due to hypertensive emergency requiring Cardene drip  Patient reports history of long-term uncontrolled hypertension, likely etiology  · MRI of the brain- Acute appearing lacunar infarct at the posterior left corona radiata  · 2D echo-EF 09-94%, grade 1 diastolic dysfunction, negative bubble study, PA pressure 35  · LDL -130  · Hemoglobin A1c is 5 8  · Neurology input appreciated  · Dual antiplatelet aspirin and Plavix for at least 90 days, atorvastatin 80 mg daily  · PT/OT/ST  · Optimize blood pressure control  · Neurology follow-up    Stage 3 chronic kidney disease ([place] [address] )  Assessment & Plan  Records reviewed from PMD is office, creatinine 1 38   Creatinine 1 41 on presentation, slightly higher at 1 44 today  Status post CTA with contrast  Denies any urinary complaints  UA noted with proteinuria  Bladder scan with evidence of urinary retention, appeared to be improving today  Monitor PVR  Monitor intake output  Hold nephrotoxins  Nephrology evaluation    Type 2 MI  Assessment & Plan  Presented with elevated troponin in setting of acute CVA  EKG normal sinus rhythm, LVH strain pattern  Troponin 0 32-0 30  Cardiology input noted  Status post nuclear stress test on 08/03-no evidence of ischemia  Continue medical management  Cardiology following    Hypertensive emergency  Assessment & Plan  In setting of acute CVA with progressive symptoms  Initially admitted to ICU , continued on Cardene drip  Reports history of uncontrolled hypertension  Status post Cardene drip  · Resumed on Bystolic, losartan, hydralazine, amlodipine  · Still with fluctuating blood pressure  · Renal duplex without any evidence of renal artery stenosis  · Will increase hydralazine to 50 mg q 8 hours, add doxazosin  · Will check plasma metanephrines, renin/aldosterone levels  · Monitor blood pressure  · Cardiology following  · Nephrology evaluation for secondary hypertension    Urinary retention  Assessment & Plan  Likely secondary to CVA and constipation  Urinary retention protocol  Bowel regimen  Starting Doxazosin as above    Low grade fever  Assessment & Plan  One episode  No clinical evidence of infection at present  Incentive spirometry  Aspiration precaution  Monitor    Abnormal finding on CT scan  Assessment & Plan  Frothy soft tissue abnormality in the nondependent portion of the trachea with imaging features favoring aspiration     Differential diagnosis could include tracheal polyp or other soft tissue lesion  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  Aspiration precaution    Abnormal MRI  Assessment & Plan  Moderate periventricular and subcortical foci of white matter T2 hyperintensity which is nonspecific and most likely related to chronic small vessel ischemic changes  A few of these white matter lesions when perpendicular to the callosal septal interface raising the possibility of demyelinating disease  Other less likely etiologies include vasculitis, Lyme and migraines    Neurology input appreciated, likely all changes related to chronic small vessel ischemic changes  Follow up with Neurology after discharge    HLD (hyperlipidemia)  Assessment & Plan    Atorvastatin 80 mg daily        VTE Pharmacologic Prophylaxis:   Pharmacologic: Enoxaparin (Lovenox)  Mechanical VTE Prophylaxis in Place: Yes    Patient Centered Rounds: I have performed bedside rounds with nursing staff today  Discussions with Specialists or Other Care Team Provider:  Dr Ayad Crowder    Education and Discussions with Family / Patient:  Wife at bedside    Time Spent for Care: 45 minutes  More than 50% of total time spent on counseling and coordination of care as described above  Current Length of Stay: 3 day(s)    Current Patient Status: Inpatient   Certification Statement: The patient will continue to require additional inpatient hospital stay due to Uncontrolled hypertension, urinary retention    Discharge Plan:  Acute rehab    Code Status: Level 1 - Full Code      Subjective:   Reports feeling well, denies any pain  Strength and speech is unchanged  Denies any headache or visual abnormality  Noted to have 1 episode of low-grade fever  Denies any chest pain, shortness of breath, cough, abdominal pain or dysuria  Noted to have urinary retention requiring straight catheterization  Reports constipation, passing flatus and denies any nausea  Blood pressure still noted with fluctuation    Objective:   Comfortably sitting in chair  Vitals:   Temp (24hrs), Av 6 °F (37 °C), Min:97 3 °F (36 3 °C), Max:100 3 °F (37 9 °C)    Temp:  [97 3 °F (36 3 °C)-100 3 °F (37 9 °C)] 99 °F (37 2 °C)  HR:  [64-76] 64  Resp:  [15-18] 18  BP: (131-200)/() 200/100  SpO2:  [95 %-97 %] 95 % on room air  Body mass index is 28 37 kg/m²  Input and Output Summary (last 24 hours):        Intake/Output Summary (Last 24 hours) at 8/3/2019 1441  Last data filed at 8/3/2019 1401  Gross per 24 hour   Intake 2309 ml   Output 3500 ml   Net -1191 ml       Physical Exam: Physical Exam   Constitutional: He is oriented to person, place, and time  He appears well-developed  No distress  HENT:   Head: Normocephalic and atraumatic  Eyes: Pupils are equal, round, and reactive to light  Conjunctivae are normal    Neck: Normal range of motion  Neck supple  Cardiovascular: Normal rate, regular rhythm and normal heart sounds  Pulmonary/Chest: Effort normal  No respiratory distress  He has no wheezes  He has no rhonchi  He has no rales  He exhibits no tenderness  Abdominal: Soft  Bowel sounds are normal  He exhibits no distension  There is no tenderness  There is no rebound and no guarding  Musculoskeletal: He exhibits no edema  Neurological: He is alert and oriented to person, place, and time  No cranial nerve deficit  Right-sided weakness, unchanged    Skin: Skin is warm and dry  No rash noted  Psychiatric: He has a normal mood and affect  Additional Data:     Labs:    Results from last 7 days   Lab Units 08/03/19  0450   WBC Thousand/uL 6 51   HEMOGLOBIN g/dL 15 6   HEMATOCRIT % 47 0   PLATELETS Thousands/uL 217   NEUTROS PCT % 61   LYMPHS PCT % 28   MONOS PCT % 9   EOS PCT % 1     Results from last 7 days   Lab Units 08/03/19  0450  07/31/19  1324   POTASSIUM mmol/L 3 4*   < > 3 7   CHLORIDE mmol/L 105   < > 103   CO2 mmol/L 22   < > 26   BUN mg/dL 16   < > 13   CREATININE mg/dL 1 44*   < > 1 41*   CALCIUM mg/dL 8 5   < > 8 9   ALK PHOS U/L  --   --  79   ALT U/L  --   --  50   AST U/L  --   --  29    < > = values in this interval not displayed  Results from last 7 days   Lab Units 07/31/19  1324   INR  1 09       * I Have Reviewed All Lab Data Listed Above  * Additional Pertinent Lab Tests Reviewed:  [facility] Admission Reviewed      Imaging:  Imaging Reports Reviewed Today Include:  CT scan, MRI      Recent Cultures (last 7 days):           Last 24 Hours Medication List:     Current Facility-Administered Medications:  acetaminophen 650 mg Oral Q6H PRN Dangelo Whitley MD   amLODIPine 10 mg Oral HS Dangelo Whitley MD   aspirin 81 mg Oral Daily Dangelo Whitley MD   atorvastatin 80 mg Oral Daily With Katie Correa MD   clopidogrel 75 mg Oral Daily Dangelo Whitley MD   docusate sodium 100 mg Oral BID Dangelo Whitley MD   enoxaparin 40 mg Subcutaneous Daily Dangelo Whtiley MD   hydrALAZINE 10 mg Intravenous Q4H PRN Dangelo Whitley MD   hydrALAZINE 50 mg Oral Q8H Kierra Blair MD   losartan 100 mg Oral HS Dangelo Whitley MD   nebivolol 20 mg Oral HS Dangelo Whitley MD   polyethylene glycol 17 g Oral Daily PRN Dangelo Whitley MD   potassium chloride 20 mEq Oral Once Jeremi Gallardo MD          Today, Patient Was Seen By: Dangelo Whitley MD    ** Please Note: "This note has been constructed using a voice recognition system  Therefore there may be syntax, spelling, and/or grammatical errors   Please call if you have any questions  "**

## 2019-08-03 NOTE — ASSESSMENT & PLAN NOTE
· Presented with elevated troponin in setting of acute CVA  · EKG normal sinus rhythm, LVH strain pattern  · Troponin 0 32-0 30  · Cardiology input noted  · Status post nuclear stress test on 08/03-no evidence of ischemia  · Continue medical management  · Cardiology following  · Patient underwent nuclear stress test which showed no ischemic changes  Normal perfusion at rest and with stress  EF 35%

## 2019-08-03 NOTE — ASSESSMENT & PLAN NOTE
· MRI showed acute CVA as well as a few white matter lesions perpendicular to the callosal septal interface raising the possibility of demyelinating disease  Other less likely etiologies include vasculitis, Lyme and migraines    · Neurology input appreciated, likely all changes related to chronic small vessel ischemic changes  · Follow up with Neurology after discharge

## 2019-08-03 NOTE — OCCUPATIONAL THERAPY NOTE
OT TREATMENT       08/03/19 1000   Restrictions/Precautions   Other Precautions Bed Alarm; Chair Alarm; Fall Risk  (R hemiparesis )   Pain Assessment   Pain Assessment No/denies pain   Bed Mobility   Supine to Sit 3  Moderate assistance   Additional items Assist x 1;Verbal cues   Transfers   Sit to Stand 3  Moderate assistance   Additional items Assist x 1;Verbal cues  (R knee blocked )   Stand to Sit 3  Moderate assistance   Additional items Assist x 1;Verbal cues   Functional Mobility   Functional Mobility   (max assist of 1, min assist of 1)   Additional Comments 15 feet, R knee blocked, verbal cues, gait belt    Additional items Hemiwalker   ROM- Right Upper Extremities   R Shoulder PROM; Flexion;ABduction;Horizontal ABduction   R Elbow PROM;Elbow flexion;Elbow extension   R Wrist PROM; Wrist flexion;Wrist extension   R Hand PROM; Thumb; Index finger; Long finger;Little finger;Ring finger   R Weight/Reps/Sets 10 times each, no active motion noted, slight muscle contraction with horizontal adduction   RUE ROM Comment pt completed self ROM for shoulder flexion and elbow flexion seated on edge of bed  Sitting balance fair-   Assessment   Assessment Tolerated well  Cooperative, pleasant and motivated  Patient is a great candidate for acute rehab  Wife present for session  Plan   Treatment Interventions ADL retraining;Functional transfer training;UE strengthening/ROM; Endurance training;Patient/family training;Equipment evaluation/education; Activityengagement; Compensatory technique education   Recommendation   OT Discharge Recommendation Short Term Rehab  (acute)   Licensure   [place] License Number  Angela Akins Peter 87 OTR/L [id number]

## 2019-08-03 NOTE — ASSESSMENT & PLAN NOTE
· Patient presented with right-sided weakness, symptoms actually started evening earlier but patient did not present at that time  · CT head and CTA of the head and neck without any acute abnormality or large vessel occlusion  · NIHSS on presentation at least 5  · Not a candidate for tPA due to out of window  · Initially was admitted to ICU due to hypertensive emergency requiring Cardene drip  · Patient reports history of long-term uncontrolled hypertension, which is the likely etiology of his stroke  Resistant hypertension concerning for secondary component  · MRI of the brain showed an Acute appearing lacunar infarct at the posterior left corona radiata  · 2D echo-EF 97-34%, grade 1 diastolic dysfunction, negative bubble study, PA pressure 35  · LDL -130  · Hemoglobin A1c is 5 8  · Neurology consulted  · Started on antiplatelet aspirin and Plavix for at least 90 days, atorvastatin 80 mg daily  · Blood pressure optimized as noted  · PT/OT/ST: no dysphagia   PT/OT recommended STR  · Patient will require follow up with neurology as outpt

## 2019-08-03 NOTE — ASSESSMENT & PLAN NOTE
· In setting of acute CVA with progressive symptoms  Reports history of uncontrolled hypertension  · Initially admitted to ICU , on Cardene drip which was weaned off  · Cardiology and nephrology consulted consulted  · Resumed on Bystolic, losartan, hydralazine, amlodipine  · Started patient on amiloride and torsemide  · Renal duplex without any evidence of renal artery stenosis  · TSH wnl  · Underwent sleep screen with elevated AHI but unclear significance as patient's sleep was interrupted    Will need formal sleep study as outpatient  · BP improved with medication regimen changes  · After discharge will need repeat renin and aldosterone level in 6 weeks as patient was on spironolactone prior to admission  · Will need follow up with nephrology as OP  · At time of discharge plasma metanephrines, renin assay, renin/creatinine ratio and aldosterone were pending

## 2019-08-03 NOTE — PROGRESS NOTES
Progress Note - Cardiology   [facility]     Duong Horner 60 y o  male MRN: [id number]  : 1955  Unit/Bed#: [address]01 Encounter: [id number]    Assessment and Plan:     1  Acute CVA   Involving left-sided infarct of coronary radiata and chronic right basal ganglia infarct  Had discussion with Neurology and Medicine  Most likely etiology is extremely uncontrolled hypertension  Patient does not need loop recorder to rule out occult atrial fibrillation  Most likely etiology is uncontrolled hypertension  Continue dual antiplatelet agents      2  Hypertensive emergency with evidence of target organ damage as manifested by stroke and elevated troponin  Blood pressure has significantly improved  Continue current medication  Will increase hydralazine to 50 mg q 8h hourly  Continue other Rx as before           3  Non ST elevation MI   Secondary to extremely high blood pressure and stroke   Troponin has been flat  Nuclear stress test shows no ischemia  Normal perfusion EF low normal      4  Abnormal EKG with LVH and strain, Lexiscan stress test shows normal perfusion  No ischemia  Low normal ejection fraction      5  History of uncontrolled hypertension  Blood pressure is slowly improving  Current blood pressure is acceptable  Hydralazine has been increased     6  Dyslipidemia   Continue statin  Tolerating well     7  Moderate to severe L left ventricle hypertrophy with normal LV systolic function and no significant valvular disease and grade 2 diastolic dysfunction consistent with hypertensive heart disease    8  Hypokalemia  Potassium supplemented  Keep potassium around 4    Plan  Please give additional dose of potassium later today  Will decrease IV fluid as creatinine remained stable around 1 4  Increase hydralazine  Continue PT OT  Nuclear finding and echo reviewed with patient  Subjective / Objective:   Patient seen and evaluated  No new complaints    Blood pressure is slowly improving  Serum creatinine remained stable  No chest pain no shortness of breath no dizziness  Nuclear stress test shows no ischemia EF is low normal     Vitals: Blood pressure 169/82, pulse 64, temperature 99 °F (37 2 °C), temperature source Oral, resp  rate 18, height 6' 1" (1 854 m), weight 97 5 kg (215 lb), SpO2 95 %  Vitals:    08/01/19 0533 08/03/19 0600   Weight: 95 8 kg (211 lb 3 2 oz) 97 5 kg (215 lb)     Body mass index is 28 37 kg/m²  BP Readings from Last 3 Encounters:   08/03/19 169/82     Orthostatic Blood Pressures      Most Recent Value   Blood Pressure  169/82 filed at 08/03/2019 0726   Patient Position - Orthostatic VS  Lying filed at 08/03/2019 0726        I/O       08/01 0701 - 08/02 0700 08/02 0701 - 08/03 0700 08/03 0701 - 08/04 0700    P  O  840 300 480    I V  (mL/kg)  999 (10 2)     Total Intake(mL/kg) 840 (8 8) 1299 (13 3) 480 (4 9)    Urine (mL/kg/hr) 1700 (0 7) 1650 (0 7) 575 (1 1)    Total Output 1700 1650 575    Net -860 -351 -95               Invasive Devices     Peripheral Intravenous Line            Peripheral IV 07/31/19 Right Antecubital 2 days                  Intake/Output Summary (Last 24 hours) at 8/3/2019 1234  Last data filed at 8/3/2019 1101  Gross per 24 hour   Intake 1779 ml   Output 2225 ml   Net -446 ml         Physical Exam:   Physical Exam    Neurologic:  Alert & oriented x 3,  no new focal deficits noted , patient has weakness of right upper extremity  Constitutional:  Well developed, well nourished,  With no acute distress  Eyes:  PERRL, conjunctiva normal   HENT:  Atraumatic, external ears normal, nose normal,   NECK: Normal range of motion, no tenderness, neck is supple , No JVP  Respiratory:  Bilateral air entry mostly clear to auscultation  Cardiovascular: S1-S2 regular with a 3/6 ejection systolic murmur   S4 is heard  GI:  Soft, nondistended, normal bowel sounds, nontender, no hepatosplenomegaly appreciated  Musculoskeletal:  No tenderness, no deformities      Extremities:  No edema  Psychiatric:  Speech and behavior appropriate       Medications/ Allergies:       Current Facility-Administered Medications:  acetaminophen 650 mg Oral Q6H PRN Davide Santiago MD    amLODIPine 10 mg Oral HS Davide Santiago MD    aspirin 81 mg Oral Daily Davide Santiago MD    atorvastatin 80 mg Oral Daily With Sarina Corbin MD    clopidogrel 75 mg Oral Daily Davide Santiago MD    docusate sodium 100 mg Oral BID Davide Santiago MD    enoxaparin 40 mg Subcutaneous Daily Davide Santiago MD    hydrALAZINE 10 mg Intravenous Q4H PRN Davide Santiago MD    hydrALAZINE 50 mg Oral Q8H [address] Amor Ramírez MD    losartan 100 mg Oral HS Davide Santiago MD    nebivolol 20 mg Oral HS Davide Santiago MD    polyethylene glycol 17 g Oral Daily PRN Davide Santiago MD    potassium chloride 20 mEq Oral Once Amor Ramírez MD    sodium chloride 50 mL/hr Intravenous Continuous Amor Ramírez MD Last Rate: 75 mL/hr (08/03/19 0325)       acetaminophen 650 mg Q6H PRN   hydrALAZINE 10 mg Q4H PRN   polyethylene glycol 17 g Daily PRN     No Known Allergies    VTE Pharmacologic Prophylaxis:   Lovenox    Labs:   Troponins:  Results from last 7 days   Lab Units 07/31/19  1557 07/31/19  1324   TROPONIN I ng/mL 0 30* 0 32*       CBC with diff:  Results from last 7 days   Lab Units 08/03/19  0450 08/02/19  0514 08/01/19  0530 07/31/19  1324   WBC Thousand/uL 6 51 8 35 8 96 7 16   HEMOGLOBIN g/dL 15 6 17 2* 16 3 16 4   HEMATOCRIT % 47 0 52 1* 48 6 50 2*   MCV fL 94 94 93 94   PLATELETS Thousands/uL 217 241 229 221   MCH pg 31 1 31 2 31 2 30 8   MCHC g/dL 33 2 33 0 33 5 32 7   RDW % 14 3 14 1 13 7 13 6   MPV fL 12 1 11 7 11 6 12 1   NRBC AUTO /100 WBCs 0 0 0  --        CMP:  Results from last 7 days   Lab Units 08/03/19  0450 08/02/19  0514 08/01/19  0530 07/31/19  1324   SODIUM mmol/L 138 139 140 140   POTASSIUM mmol/L 3 4* 4 0 3 7 3 7   CHLORIDE mmol/L 105 103 103 103 CO2 mmol/L 22 29 26 26   ANION GAP mmol/L 11 7 11 11   BUN mg/dL 16 18 13 13   CREATININE mg/dL 1 44* 1 53* 1 43* 1 41*   CALCIUM mg/dL 8 5 8 9 9 0 8 9   AST U/L  --   --   --  29   ALT U/L  --   --   --  50   ALK PHOS U/L  --   --   --  79   TOTAL PROTEIN g/dL  --   --   --  7 8   ALBUMIN g/dL  --   --   --  3 9   TOTAL BILIRUBIN mg/dL  --   --   --  0 80   EGFR ml/min/1 73sq m 60 56 60 61       Magnesium:  Results from last 7 days   Lab Units 08/01/19  0530   MAGNESIUM mg/dL 2 1     Coags:  Results from last 7 days   Lab Units 07/31/19  1324   PTT seconds 29   INR  1 09     TSH:  Results from last 7 days   Lab Units 08/01/19  0530   TSH 3RD GENERATON uIU/mL 1 244     Lipid Profile:  Results from last 7 days   Lab Units 08/01/19  0530   CHOLESTEROL mg/dL 193   TRIGLYCERIDES mg/dL 97   HDL mg/dL 44   LDL CALC mg/dL 130*     Hgb A1c:  Results from last 7 days   Lab Units 08/01/19  0530   HEMOGLOBIN A1C % 5 8         Imaging & Testing   I have personally reviewed pertinent reports  X-ray Chest 1 View Portable    Result Date: 7/31/2019  Narrative: CHEST INDICATION:   Stroke  COMPARISON:  None EXAM PERFORMED/VIEWS:  XR CHEST PORTABLE FINDINGS: Cardiomediastinal silhouette appears unremarkable  The lungs are clear  No pneumothorax or pleural effusion  Osseous structures appear within normal limits for patient age  Impression: No acute cardiopulmonary disease  Workstation performed: [id number]     Mri Brain Wo Contrast    Result Date: 7/31/2019  Narrative: MRI BRAIN WITHOUT CONTRAST INDICATION: CVA  COMPARISON:   None  TECHNIQUE:  Sagittal T1, axial T2, axial FLAIR, axial T1, axial [place] and axial diffusion imaging  IMAGE QUALITY:  Diagnostic  FINDINGS: BRAIN PARENCHYMA:  There is no discrete mass, mass effect or midline shift  There is no intracranial hemorrhage  There is a small area of restricted diffusion measuring 15 x 8 mm at the posterior left corona radiata compatible with an acute focus of ischemia  There are are moderate periventricular and subcortical foci of white matter T2 hyperintensity which is nonspecific and most likely related to chronic small vessel ischemic changes  A few of these white matter lesions when perpendicular to the  callosal septal interface raising the possibility of demyelinating disease  There is an old right basal ganglia lacunar infarct with minimal hemosiderin deposition  VENTRICLES:  The ventricles are normal in size and contour  SELLA AND PITUITARY GLAND:  Normal  ORBITS:  Normal  PARANASAL SINUSES:  There is mucosal thickening of the left maxillary sinus  There is minimal mucosal thickening of the bilateral ethmoid air cells  VASCULATURE:  Evaluation of the major intracranial vasculature demonstrates appropriate flow voids  CALVARIUM AND SKULL BASE:  Normal  EXTRACRANIAL SOFT TISSUES:  Normal      Impression: Acute appearing lacunar infarct at the posterior left corona radiata  Moderate periventricular and subcortical foci of white matter T2 hyperintensity which is nonspecific and most likely related to chronic small vessel ischemic changes  A few of these white matter lesions when perpendicular to the callosal septal interface raising the possibility of demyelinating disease  Other less likely etiologies include vasculitis, Lyme and migraines  Old right basal ganglia lacunar infarct  I personally discussed this study with Deborah Guzman on 7/31/2019 at 5:05 PM  Workstation performed: [id number]     Ct Stroke Alert Brain    Result Date: 7/31/2019  Narrative: CT BRAIN - STROKE ALERT PROTOCOL INDICATION:   Right-sided weakness and limb ataxia  Symptoms started 5:00 PM last evening  Dysmetria  COMPARISON:  None  TECHNIQUE:  CT examination of the brain was performed  In addition to axial images, coronal reformatted images were created and submitted for interpretation  Radiation dose length product (DLP) for this visit:  972 mGy-cm     This examination, like all CT scans performed in the [facility], was performed utilizing techniques to minimize radiation dose exposure, including the use of iterative reconstruction and automated exposure control  IMAGE QUALITY:  Diagnostic  FINDINGS:  PARENCHYMA:  Decreased attenuation is noted in the supratentorial white matter demonstrating an appearance most consistent with moderate microangiopathic change  No intracranial mass, mass effect or midline shift  No acute intracranial hemorrhage  No CT signs of acute infarction  Chronic appearing right basal ganglia lacunar infarctions  Normal intracranial vasculature  VENTRICLES AND EXTRA-AXIAL SPACES:  Normal for patient's age  VISUALIZED ORBITS AND PARANASAL SINUSES:  Mild left and minimal right maxillary mucosal thickening  CALVARIUM AND EXTRACRANIAL SOFT TISSUES:   Normal      Impression: No acute intracranial hemorrhage, mass effect or edema  Microangiopathic changes  Chronic appearing right basal ganglia lacunar infarctions  Findings were directly discussed with Dr Amarjit Willson on 7/31/2019 1:32 PM  Workstation performed: [id number]     Vas Renal Artery Complete    Result Date: 8/2/2019  Narrative:  [facility] REPORT CLINICAL: Indications: Patient presents to evaluate the renal arteries secondary to uncontrolled HTN  Patient is currently on 4 medications for Blood pressure  Risk Factors: The patient has history of HTN and HLD  The patient current BMI is 27 84, Weight is 211 lb and height is 73 in  Clinical: Right Pressure:  153/79 mm Hg, Left Pressure:  / mm Hg  FINDINGS:  Unilateral       PSV (cm/s)  EDV (cm/s)  Sup-Jennie Ao              103              Sup Renal Aorta          89              Celiac                  142          27  Prox   SMA               138               Right         Impression  PSV (cm/s)  EDV (cm/s)   RAR    RI  Kidney (cm)  Ostial Renal                      89          27  0 87  0 70               Prox Renal                        79 25  0 77  0 68               Mid Renal                         60          15  0 58  0 75               Dist Renal                        59          17  0 57  0 71               Kidney                                                              11 50  Hilum 1                           19           4        0 76               Mid Pole                          18           6        0 66               Hilum 3                           18           6        0 68               Renal         Patent                                                        Left          Impression  PSV (cm/s)  EDV (cm/s)   RAR    RI  Kidney (cm)  Ostial Renal                      95          30  0 92  0 68               Prox Renal                       115          35  1 11  0 70               Mid Renal                         61          15  0 59  0 75               Dist Renal                        48          15  0 47  0 68               Kidney                                                              10 30  Hilum 1                           20           7        0 66               Mid Pole                          19           7        0 60               Hilum 3                           20           7        0 68               Renal         Patent                                                          CONCLUSION: Impression The abdominal aorta is widely patent and normal caliber  RIGHT RENAL: No evidence of significant arterial occlusive disease in the main renal artery  Patent renal vein  Renovascular resistive index of 0 76  Renal/Aorta Ratio: 0 87  The Kidney measures 11 5 cm  LEFT RENAL: No evidence of significant arterial occlusive disease in the main renal artery  Patent renal vein  Renovascular resistive index of 0 68  Renal/Aorta Ratio: 1 11  The Kidney measures 10 3 cm  MESENTERIC: Celiac and superior mesenteric arteries are patent    SIGNATURE: Electronically Signed by: Rey Doyle MD, [address] on 2019-08-02 04:15:51 PM    Cta Stroke Alert (head/neck)    Result Date: 7/31/2019  Narrative: CTA NECK AND BRAIN WITH CONTRAST INDICATION: Right-sided weakness and limb ataxia COMPARISON:   Concurrent head CT TECHNIQUE:   Post contrast imaging was performed after administration of iodinated contrast through the neck and brain  Post contrast axial 0 625 mm images timed to opacify the arterial system  3D rendering was performed on an independent workstation  MIP reconstructions performed  Coronal reconstructions were performed of the noncontrast portion of the brain  Radiation dose length product (DLP) for this visit:  382 mGy-cm   This examination, like all CT scans performed in the [place], was performed utilizing techniques to minimize radiation dose exposure, including the use of iterative reconstruction and automated exposure control  IV Contrast:  85 mL of iohexol (OMNIPAQUE)  IMAGE QUALITY:   Diagnostic FINDINGS: CERVICAL VASCULATURE AORTIC ARCH AND GREAT VESSELS:  Mild athersclerotic disease of the arch, proximal great vessels and visualized subclavian vessels  No significant stenosis  RIGHT VERTEBRAL ARTERY CERVICAL SEGMENT:  Normal origin  The vessel is normal in caliber throughout the neck  LEFT VERTEBRAL ARTERY CERVICAL SEGMENT:  Normal origin  The vessel is normal in caliber throughout the neck  RIGHT EXTRACRANIAL CAROTID SEGMENT:  Mild atherosclerotic disease of the distal common carotid artery and proximal cervical internal carotid artery without significant stenosis compared to the more distal ICA  LEFT EXTRACRANIAL CAROTID SEGMENT:  Mild atherosclerotic disease of the distal common carotid artery and proximal cervical internal carotid artery without significant stenosis compared to the more distal ICA  NASCET criteria was used to determine the degree of internal carotid artery diameter stenosis   INTRACRANIAL VASCULATURE INTERNAL CAROTID ARTERIES:  Normal enhancement of the intracranial portions of the internal carotid arteries  Normal ophthalmic artery origins  Normal ICA terminus  ANTERIOR CIRCULATION:  Symmetric A1 segments and anterior cerebral arteries with normal enhancement  Normal anterior communicating artery  MIDDLE CEREBRAL ARTERY CIRCULATION:  M1 segment and middle cerebral artery branches demonstrate normal enhancement bilaterally  DISTAL VERTEBRAL ARTERIES:  Normal distal vertebral arteries  Posterior inferior cerebellar artery origins are normal  Normal vertebral basilar junction  BASILAR ARTERY:  Basilar artery is normal in caliber  Normal superior cerebellar arteries  POSTERIOR CEREBRAL ARTERIES: Both posterior cerebral arteries arises from the basilar tip  Both arteries demonstrate normal enhancement  Normal posterior communicating arteries  DURAL VENOUS SINUSES:  Not well opacified on this arterial phase scan NON VASCULAR ANATOMY BONY STRUCTURES:  Scattered spondylotic changes noted  Ossification of posterior longitudinal ligament C4-C7 noted  This ossification causes moderate to severe spinal stenosis  SOFT TISSUES OF THE NECK:  Unremarkable  THORACIC INLET:  There is appearance frothy soft tissue abnormality in the nondependent portion of the trachea on image 267, series 2 likely aspirated material   Tracheal polyp could be considered in the differential diagnosis although the presence of air lucencies admixed with soft tissue density argues for aspiration  Incidental note is made of a right paratracheal air cyst posteriorly on image 227, series 2 at the thoracic inlet  Tiny 2 mm subpleural nodule image 253, series 2 in the left upper lobe noted  Impression: 1  No hemodynamically significant stenosis in the major arteries of the neck  2   No intracranial aneurysm or major intracranial arterial stenosis  3   Frothy soft tissue abnormality in the nondependent portion of the trachea with imaging features favoring aspiration    Differential diagnosis could include tracheal polyp or other soft tissue lesion  4   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  Findings were directly discussed with Dr Jacqueline Stafford on 2019 1:32 PM  Workstation performed: [id number]        EKG / Monitor: Personally reviewed  Normal sinus rhythm LVH no change from old EKG  Cardiac testing:   Results for orders placed during the hospital encounter of 19   Echo complete with contrast if indicated    Narrative [address]  [address] [address]  [phone]    Transthoracic Echocardiogram  2D, M-mode, Doppler, and Color Doppler    Study date:  01-Aug-2019    Patient: Mitra Aguilar  MR number: [id number]  Account number: [de-identified]  : 1955  Age: 60 years  Gender: Male  Status: Inpatient  Location: Bedside  Height: 73 in  Weight: 210 5 lb  BP: 208/ 110 mmHg    Indications: Cerebral Thrombosis    Diagnoses: I67 9 - Cerebrovascular disease, unspecified    Sonographer:  JUSTIN Gonzalez  Primary Physician:  Durga Appiah MD  Referring Physician:  Jud Felix MD  Group:  [id number]  Interpreting Physician:  Kassy Sawyer MD    SUMMARY    LEFT VENTRICLE:  Systolic function was normal  Ejection fraction was estimated in the range of 60 % to 65 % to be 65 %  There were no regional wall motion abnormalities  Wall thickness was moderately increased  There was severe concentric hypertrophy  Doppler parameters were consistent with abnormal left ventricular relaxation (grade 1 diastolic dysfunction)  Doppler parameters were consistent with high ventricular filling pressure  LEFT ATRIUM:  The atrium was mildly dilated  ATRIAL SEPTUM:  No defect or patent foramen ovale was identified by bubble study  MITRAL VALVE:  There was trace regurgitation  TRICUSPID VALVE:  There was mild regurgitation  Estimated peak PA pressure was 35 mmHg      IVC, HEPATIC VEINS:  The respirophasic change in diameter was more than 50%  HISTORY: PRIOR HISTORY: HTN, Hyperlipidemia    PROCEDURE: The procedure was performed at the bedside  This was a routine study  The transthoracic approach was used  The study included complete 2D imaging, M-mode, complete spectral Doppler, and color Doppler  The heart rate was 70 bpm,  at the start of the study  Images were not obtained from the subcostal acoustic windows  Intravenous contrast (agitated saline, 10 ml) was administered to evaluate shunting  Image quality was adequate  LEFT VENTRICLE: Size was normal  Systolic function was normal  Ejection fraction was estimated in the range of 60 % to 65 % to be 65 %  There were no regional wall motion abnormalities  Wall thickness was moderately increased  There was  severe concentric hypertrophy  DOPPLER: Doppler parameters were consistent with abnormal left ventricular relaxation (grade 1 diastolic dysfunction)  Doppler parameters were consistent with high ventricular filling pressure  RIGHT VENTRICLE: The size was normal  Systolic function was normal     LEFT ATRIUM: The atrium was mildly dilated  ATRIAL SEPTUM: No defect or patent foramen ovale was identified by bubble study  RIGHT ATRIUM: Size was normal     MITRAL VALVE: There was normal leaflet separation  DOPPLER: The transmitral velocity was within the normal range  There was no evidence for stenosis  There was trace regurgitation  AORTIC VALVE: The valve was trileaflet  Leaflets exhibited mildly increased thickness and normal cuspal separation  DOPPLER: Transaortic velocity was minimally increased  There was no evidence for stenosis  There was no regurgitation  TRICUSPID VALVE: DOPPLER: There was mild regurgitation  Estimated peak PA pressure was 35 mmHg  PULMONIC VALVE: DOPPLER: There was no significant regurgitation  PERICARDIUM: There was no thickening or calcification  There was no pericardial effusion      AORTA: The root exhibited normal size  SYSTEMIC VEINS: IVC: The inferior vena cava was normal in size  The respirophasic change in diameter was more than 50%  SYSTEM MEASUREMENT TABLES    2D mode  AoR Diam 2D: 3 7 cm  LA Diam (2D): 4 cm  LA/Ao (2D): 1 08  FS (2D Teich): 28 8 %  IVSd (2D): 1 52 cm  LVDEV: 99 8 cmï¾³  LVESV: 44 5 cmï¾³  LVIDd(2D): 4 65 cm  LVISd (2D): 3 31 cm  LVPWd (2D): 1 65 cm  SV (Teich): 55 3 cmï¾³    Apical four chamber  LVEF A4C: 55 %    Unspecified Scan Mode  MV Peak A Jc: 816 mm/s  MV Peak E Jc  Mean: 569 mm/s  MVA (PHT): 3 93 cmï¾²  PHT: 56 ms  Max P mm[Hg]  V Max: 2730 mm/s  Vmax: 2730 mm/s  RA Area: 14 9 cmï¾²  RA Volume: 35 6 cmï¾³  TAPSE: 2 1 cm    [employer] Accredited Echocardiography Laboratory    Prepared and electronically signed by    Amanda Altman MD  Signed 01-Aug-2019 10:03:30         Dr Amanda Altman MD [facility]      "This note has been constructed using a voice recognition system  Therefore there may be syntax, spelling, and/or grammatical errors   Please call if you have any questions  "

## 2019-08-03 NOTE — UTILIZATION REVIEW
Continued Stay Review  Date: 8/3/19                        Current Patient Class: INPATIENT  Current Level of Care: MED SURG  Assessment/Plan:   LOW GRADE TEMP @ 100 3  HYDRALAZINE DOSING INCREASED FOR BETTER BP CONTROL  HYPOKALEMIA WITH REPLETION GIVEN  CREATININE TRENDING DOWNWARD, IVF MAINTAINED     Pertinent Labs/Diagnostic Results:   Results from last 7 days   Lab Units 08/03/19 0450 08/02/19 0514 08/01/19  0530 07/31/19  1324   WBC Thousand/uL 6 51 8 35 8 96 7 16   HEMOGLOBIN g/dL 15 6 17 2* 16 3 16 4   HEMATOCRIT % 47 0 52 1* 48 6 50 2*   PLATELETS Thousands/uL 217 241 229 221   NEUTROS ABS Thousands/µL 3 96 4 06 5 35  --      Results from last 7 days   Lab Units 08/03/19 0450 08/02/19  0514 08/01/19  0530 07/31/19  1324   SODIUM mmol/L 138 139 140 140   POTASSIUM mmol/L 3 4* 4 0 3 7 3 7   CHLORIDE mmol/L 105 103 103 103   CO2 mmol/L 22 29 26 26   ANION GAP mmol/L 11 7 11 11   BUN mg/dL 16 18 13 13   CREATININE mg/dL 1 44* 1 53* 1 43* 1 41*   EGFR ml/min/1 73sq m 60 56 60 61   CALCIUM mg/dL 8 5 8 9 9 0 8 9   MAGNESIUM mg/dL  --   --  2 1  --    PHOSPHORUS mg/dL  --   --  3 5  --      Results from last 7 days   Lab Units 08/03/19 0450 08/02/19 0514 08/01/19  0530 07/31/19  1324   GLUCOSE RANDOM mg/dL 99 98 96 96     Vital Signs: /74   Pulse 69   Temp 100 3 °F (37 9 °C) (Oral)   Resp 18   Ht 6' 1" (1 854 m)   Wt 97 5 kg (215 lb)   SpO2 96%   BMI 28 37 kg/m²    Medications:   Scheduled Meds:   Current Facility-Administered Medications:  acetaminophen 650 mg Oral Q6H PRN   amLODIPine 10 mg Oral HS   aspirin 81 mg Oral Daily   atorvastatin 80 mg Oral Daily With Dinner   clopidogrel 75 mg Oral Daily   enoxaparin 40 mg Subcutaneous Daily   hydrALAZINE 10 mg Intravenous Q4H PRN-USED X2/24HRS   hydrALAZINE 25 mg Oral Q8H POP   losartan 100 mg Oral HS   nebivolol 20 mg Oral HS   sodium chloride 75 mL/hr Intravenous Continuous     Discharge Plan: TBD  Network Utilization Review Department  Phone: [phone]; Fax [phone]  [email or web address]  ATTENTION: Please call with any questions or concerns to [phone]  and carefully listen to the prompts so that you are directed to the right person  Send all requests for admission clinical reviews, approved or denied determinations and any other requests to fax [phone]   All voicemails are confidential

## 2019-08-03 NOTE — PHYSICAL THERAPY NOTE
PT TREATMENT     08/03/19 3618   Pain Assessment   Pain Assessment No/denies pain   Restrictions/Precautions   Other Precautions Chair Alarm; Bed Alarm; Fall Risk  (R sided weakness)   General   Chart Reviewed Yes   Family/Caregiver Present No   Cognition   Arousal/Participation Cooperative   Subjective   Subjective patient without pain    Bed Mobility   Supine to Sit 3  Moderate assistance   Additional items Assist x 1;Verbal cues;LE management   Transfers   Sit to Stand 3  Moderate assistance   Additional items Assist x 1;Verbal cues   Stand to Sit 3  Moderate assistance   Additional items Assist x 1;Verbal cues   Ambulation/Elevation   Gait Assistance   (max assist of 1 and min of another)   Additional items Assist x 1;Verbal cues; Tactile cues  (ace wrap R ankle into dorsiflexion)   Assistive Device Francisco J-walker  (gait belt)   Distance 15 feet with max assist for weight shifting and blocking R knee for safe gait patterning, constant verbal cuing for proper use of hemiwalker and weight shifting   Balance   Static Sitting Fair +   Dynamic Sitting Fair   Static Standing Fair -   Dynamic Standing Poor +   Ambulatory Poor +   Activity Tolerance   Activity Tolerance Patient tolerated treatment well  (limited by R sided weakness)   Exercises   Balance training  sitting and standing weight shifting and balance training completed  Also completed R UE weight bearing with gait patterning for weight shifting and reeducation as well  Sitting balance activity completed with BLEs ROM and strengthening/reeducation   Assessment   Prognosis Good   Assessment patient able to demonstate gait this session with skilled asssist of 2 therapists and will benefit from continued PT with progression as tolerated and STR   Plan   Treatment/Interventions ADL retraining;Functional transfer training;LE strengthening/ROM; Therapeutic exercise; Endurance training;Patient/family training;Equipment eval/education; Bed mobility;Gait training; Compensatory technique education   PT Frequency Once a day   Recommendation   Recommendation Short-term skilled PT   Licensure   [place] License Number  Fransisco Guptabayron PT [id number]

## 2019-08-03 NOTE — PROGRESS NOTES
Received patient AAO with right sided weakness to the arms and lower extremities  Bladder scan  cc  Patient also had a large BM

## 2019-08-04 ENCOUNTER — APPOINTMENT (INPATIENT)
Dept: RADIOLOGY | Facility: HOSPITAL | Age: 64
DRG: 064 | End: 2019-08-04
Payer: COMMERCIAL

## 2019-08-04 LAB
ANION GAP SERPL CALCULATED.3IONS-SCNC: 10 MMOL/L (ref 4–13)
ANION GAP SERPL CALCULATED.3IONS-SCNC: 12 MMOL/L (ref 4–13)
BASOPHILS # BLD AUTO: 0.05 THOUSANDS/ΜL (ref 0–0.1)
BASOPHILS NFR BLD AUTO: 1 % (ref 0–1)
BUN SERPL-MCNC: 15 MG/DL (ref 5–25)
BUN SERPL-MCNC: 21 MG/DL (ref 5–25)
CALCIUM SERPL-MCNC: 8.6 MG/DL (ref 8.3–10.1)
CALCIUM SERPL-MCNC: 9.1 MG/DL (ref 8.3–10.1)
CHLORIDE SERPL-SCNC: 102 MMOL/L (ref 100–108)
CHLORIDE SERPL-SCNC: 106 MMOL/L (ref 100–108)
CO2 SERPL-SCNC: 23 MMOL/L (ref 21–32)
CO2 SERPL-SCNC: 23 MMOL/L (ref 21–32)
CREAT SERPL-MCNC: 1.45 MG/DL (ref 0.6–1.3)
CREAT SERPL-MCNC: 1.74 MG/DL (ref 0.6–1.3)
EOSINOPHIL # BLD AUTO: 0.15 THOUSAND/ΜL (ref 0–0.61)
EOSINOPHIL NFR BLD AUTO: 2 % (ref 0–6)
ERYTHROCYTE [DISTWIDTH] IN BLOOD BY AUTOMATED COUNT: 14.1 % (ref 11.6–15.1)
ERYTHROCYTE [DISTWIDTH] IN BLOOD BY AUTOMATED COUNT: 14.5 % (ref 11.6–15.1)
GFR SERPL CREATININE-BSD FRML MDRD: 48 ML/MIN/1.73SQ M
GFR SERPL CREATININE-BSD FRML MDRD: 59 ML/MIN/1.73SQ M
GLUCOSE SERPL-MCNC: 125 MG/DL (ref 65–140)
GLUCOSE SERPL-MCNC: 99 MG/DL (ref 65–140)
HCT VFR BLD AUTO: 46.8 % (ref 36.5–49.3)
HCT VFR BLD AUTO: 48.5 % (ref 36.5–49.3)
HGB BLD-MCNC: 15.8 G/DL (ref 12–17)
HGB BLD-MCNC: 15.9 G/DL (ref 12–17)
IMM GRANULOCYTES # BLD AUTO: 0.01 THOUSAND/UL (ref 0–0.2)
IMM GRANULOCYTES NFR BLD AUTO: 0 % (ref 0–2)
LACTATE SERPL-SCNC: 1.3 MMOL/L (ref 0.5–2)
LYMPHOCYTES # BLD AUTO: 2.18 THOUSANDS/ΜL (ref 0.6–4.47)
LYMPHOCYTES NFR BLD AUTO: 35 % (ref 14–44)
MCH RBC QN AUTO: 30.9 PG (ref 26.8–34.3)
MCH RBC QN AUTO: 31.3 PG (ref 26.8–34.3)
MCHC RBC AUTO-ENTMCNC: 32.8 G/DL (ref 31.4–37.4)
MCHC RBC AUTO-ENTMCNC: 33.8 G/DL (ref 31.4–37.4)
MCV RBC AUTO: 93 FL (ref 82–98)
MCV RBC AUTO: 94 FL (ref 82–98)
MONOCYTES # BLD AUTO: 0.65 THOUSAND/ΜL (ref 0.17–1.22)
MONOCYTES NFR BLD AUTO: 11 % (ref 4–12)
NEUTROPHILS # BLD AUTO: 3.15 THOUSANDS/ΜL (ref 1.85–7.62)
NEUTS SEG NFR BLD AUTO: 51 % (ref 43–75)
NRBC BLD AUTO-RTO: 0 /100 WBCS
PLATELET # BLD AUTO: 198 THOUSANDS/UL (ref 149–390)
PLATELET # BLD AUTO: 226 THOUSANDS/UL (ref 149–390)
PMV BLD AUTO: 11.8 FL (ref 8.9–12.7)
PMV BLD AUTO: 12 FL (ref 8.9–12.7)
POTASSIUM SERPL-SCNC: 3.7 MMOL/L (ref 3.5–5.3)
POTASSIUM SERPL-SCNC: 3.8 MMOL/L (ref 3.5–5.3)
RBC # BLD AUTO: 5.05 MILLION/UL (ref 3.88–5.62)
RBC # BLD AUTO: 5.14 MILLION/UL (ref 3.88–5.62)
SODIUM SERPL-SCNC: 137 MMOL/L (ref 136–145)
SODIUM SERPL-SCNC: 139 MMOL/L (ref 136–145)
WBC # BLD AUTO: 16.53 THOUSAND/UL (ref 4.31–10.16)
WBC # BLD AUTO: 6.19 THOUSAND/UL (ref 4.31–10.16)

## 2019-08-04 PROCEDURE — 99232 SBSQ HOSP IP/OBS MODERATE 35: CPT | Performed by: INTERNAL MEDICINE

## 2019-08-04 PROCEDURE — 94760 N-INVAS EAR/PLS OXIMETRY 1: CPT

## 2019-08-04 PROCEDURE — 71045 X-RAY EXAM CHEST 1 VIEW: CPT

## 2019-08-04 PROCEDURE — 83835 ASSAY OF METANEPHRINES: CPT | Performed by: INTERNAL MEDICINE

## 2019-08-04 PROCEDURE — 97530 THERAPEUTIC ACTIVITIES: CPT

## 2019-08-04 PROCEDURE — 94762 N-INVAS EAR/PLS OXIMTRY CONT: CPT

## 2019-08-04 PROCEDURE — 85027 COMPLETE CBC AUTOMATED: CPT | Performed by: NURSE PRACTITIONER

## 2019-08-04 PROCEDURE — 83605 ASSAY OF LACTIC ACID: CPT | Performed by: NURSE PRACTITIONER

## 2019-08-04 PROCEDURE — 80048 BASIC METABOLIC PNL TOTAL CA: CPT | Performed by: NURSE PRACTITIONER

## 2019-08-04 PROCEDURE — 74018 RADEX ABDOMEN 1 VIEW: CPT

## 2019-08-04 PROCEDURE — 85025 COMPLETE CBC W/AUTO DIFF WBC: CPT | Performed by: INTERNAL MEDICINE

## 2019-08-04 PROCEDURE — 87040 BLOOD CULTURE FOR BACTERIA: CPT | Performed by: NURSE PRACTITIONER

## 2019-08-04 PROCEDURE — 97110 THERAPEUTIC EXERCISES: CPT

## 2019-08-04 PROCEDURE — 99255 IP/OBS CONSLTJ NEW/EST HI 80: CPT | Performed by: INTERNAL MEDICINE

## 2019-08-04 PROCEDURE — 82088 ASSAY OF ALDOSTERONE: CPT | Performed by: INTERNAL MEDICINE

## 2019-08-04 PROCEDURE — 80048 BASIC METABOLIC PNL TOTAL CA: CPT | Performed by: INTERNAL MEDICINE

## 2019-08-04 PROCEDURE — 84244 ASSAY OF RENIN: CPT | Performed by: INTERNAL MEDICINE

## 2019-08-04 RX ORDER — LOSARTAN POTASSIUM 50 MG/1
100 TABLET ORAL DAILY
Status: DISCONTINUED | OUTPATIENT
Start: 2019-08-05 | End: 2019-08-06 | Stop reason: HOSPADM

## 2019-08-04 RX ORDER — HYDRALAZINE HYDROCHLORIDE 25 MG/1
50 TABLET, FILM COATED ORAL EVERY 8 HOURS SCHEDULED
Status: DISCONTINUED | OUTPATIENT
Start: 2019-08-04 | End: 2019-08-06 | Stop reason: HOSPADM

## 2019-08-04 RX ORDER — PANTOPRAZOLE SODIUM 40 MG/1
40 TABLET, DELAYED RELEASE ORAL
Status: DISCONTINUED | OUTPATIENT
Start: 2019-08-04 | End: 2019-08-06 | Stop reason: HOSPADM

## 2019-08-04 RX ORDER — MAGNESIUM HYDROXIDE/ALUMINUM HYDROXICE/SIMETHICONE 120; 1200; 1200 MG/30ML; MG/30ML; MG/30ML
30 SUSPENSION ORAL EVERY 4 HOURS PRN
Status: DISCONTINUED | OUTPATIENT
Start: 2019-08-04 | End: 2019-08-06 | Stop reason: HOSPADM

## 2019-08-04 RX ORDER — DOXAZOSIN 2 MG/1
2 TABLET ORAL
Status: DISCONTINUED | OUTPATIENT
Start: 2019-08-04 | End: 2019-08-05

## 2019-08-04 RX ORDER — AMLODIPINE BESYLATE 10 MG/1
10 TABLET ORAL DAILY
Status: DISCONTINUED | OUTPATIENT
Start: 2019-08-05 | End: 2019-08-06 | Stop reason: HOSPADM

## 2019-08-04 RX ORDER — ONDANSETRON 2 MG/ML
4 INJECTION INTRAMUSCULAR; INTRAVENOUS EVERY 6 HOURS PRN
Status: DISCONTINUED | OUTPATIENT
Start: 2019-08-04 | End: 2019-08-06 | Stop reason: HOSPADM

## 2019-08-04 RX ORDER — TORSEMIDE 10 MG/1
10 TABLET ORAL DAILY
Status: DISCONTINUED | OUTPATIENT
Start: 2019-08-04 | End: 2019-08-05

## 2019-08-04 RX ORDER — AMILORIDE HYDROCHLORIDE 5 MG/1
10 TABLET ORAL DAILY
Status: DISCONTINUED | OUTPATIENT
Start: 2019-08-04 | End: 2019-08-05

## 2019-08-04 RX ADMIN — DOCUSATE SODIUM 100 MG: 100 CAPSULE, LIQUID FILLED ORAL at 08:24

## 2019-08-04 RX ADMIN — ALUMINUM HYDROXIDE, MAGNESIUM HYDROXIDE, AND SIMETHICONE 30 ML: 200; 200; 20 SUSPENSION ORAL at 19:13

## 2019-08-04 RX ADMIN — Medication 2000 MG: at 23:40

## 2019-08-04 RX ADMIN — TORSEMIDE 10 MG: 10 TABLET ORAL at 16:06

## 2019-08-04 RX ADMIN — METRONIDAZOLE 500 MG: 500 INJECTION, SOLUTION INTRAVENOUS at 22:47

## 2019-08-04 RX ADMIN — DOCUSATE SODIUM 100 MG: 100 CAPSULE, LIQUID FILLED ORAL at 17:44

## 2019-08-04 RX ADMIN — AMILORIDE HYDROCLORIDE 10 MG: 5 TABLET ORAL at 16:07

## 2019-08-04 RX ADMIN — CLOPIDOGREL BISULFATE 75 MG: 75 TABLET ORAL at 08:24

## 2019-08-04 RX ADMIN — ACETAMINOPHEN 650 MG: 325 TABLET, FILM COATED ORAL at 22:39

## 2019-08-04 RX ADMIN — HYDRALAZINE HYDROCHLORIDE 50 MG: 25 TABLET ORAL at 16:05

## 2019-08-04 RX ADMIN — ASPIRIN 81 MG: 81 TABLET, COATED ORAL at 08:23

## 2019-08-04 RX ADMIN — PANTOPRAZOLE SODIUM 40 MG: 40 TABLET, DELAYED RELEASE ORAL at 19:13

## 2019-08-04 RX ADMIN — ACETAMINOPHEN 650 MG: 325 TABLET, FILM COATED ORAL at 16:05

## 2019-08-04 RX ADMIN — AMLODIPINE BESYLATE 10 MG: 10 TABLET ORAL at 08:23

## 2019-08-04 RX ADMIN — ENOXAPARIN SODIUM 40 MG: 40 INJECTION SUBCUTANEOUS at 08:24

## 2019-08-04 RX ADMIN — ATORVASTATIN CALCIUM 80 MG: 80 TABLET ORAL at 16:06

## 2019-08-04 RX ADMIN — ONDANSETRON 4 MG: 2 INJECTION INTRAMUSCULAR; INTRAVENOUS at 20:51

## 2019-08-04 RX ADMIN — LOSARTAN POTASSIUM 100 MG: 50 TABLET ORAL at 08:24

## 2019-08-04 RX ADMIN — HYDRALAZINE HYDROCHLORIDE 50 MG: 25 TABLET ORAL at 05:45

## 2019-08-04 NOTE — ASSESSMENT & PLAN NOTE
No further episodes  No clinical evidence of infection at present    White count remains within normal limit  Denies headache, shortness of breath, cough, chest pain, dysuria  Incentive spirometry  Aspiration precaution  Monitor

## 2019-08-04 NOTE — OCCUPATIONAL THERAPY NOTE
OT TREATMENT       08/04/19 0900   Restrictions/Precautions   Other Precautions Chair Alarm; Bed Alarm; Fall Risk   Pain Assessment   Pain Assessment No/denies pain   ADL   Eating Assistance 5  Supervision/Setup   Eating Deficit Setup   LB Dressing Assistance 5  Supervision/Setup   LB Dressing Deficit Don/doff L sock   LB Dressing Comments long sit, instructed on 1 handed technique, pt completed with verbal cues only    Bed Mobility   Supine to Sit 3  Moderate assistance   Additional items Assist x 1;Verbal cues;LE management   Transfers   Sit to Stand 3  Moderate assistance   Additional items Assist x 1;Verbal cues  (R knee blocked )   Stand to Sit 3  Moderate assistance   Additional items Assist x 1;Verbal cues   Functional Mobility   Functional Mobility   (max assist of 1, min assist of 1 )   Additional Comments 20 feet, hemiwalker, gait belt, R knee blocked    Additional items Hemiwalker   ROM- Right Upper Extremities   R Shoulder PROM; Flexion; Horizontal ABduction;ABduction   R Elbow PROM;Elbow extension;Elbow flexion   R Wrist PROM; Wrist flexion;Wrist extension;Radial deviation;Ulnar deviation  (pronation/supination)   R Hand PROM; Thumb; Long finger; Index finger;Ring finger;Little finger   R Weight/Reps/Sets 10 times each, pt completed self ROM seated on edge of bed with supervision for balance  Self ROM for R  fingers, wrist, elbow  RUE ROM Comment pt educated on positioning of RUE for support at rest on pillows,  Patient receptive    Additional Activities   Additional Activities Comments pt reports RUE numbness   Assessment   Assessment Patient is cooperative, pleasant and motivated  Slight muscle contraction with horizontal adduction, otherwise no AROM in RUE  Patient is showing progress with balance and functional mobility with RW  Skilled assist of PT required for safety due to patients significant weakness and assistance required for mobility       Plan   Treatment Interventions ADL retraining;Functional transfer training;UE strengthening/ROM; Endurance training;Patient/family training;Equipment evaluation/education; Activityengagement; Compensatory technique education   Recommendation   OT Discharge Recommendation [id number] License Number  Carmelina Akins Peter 89 OTR/L [id number]

## 2019-08-04 NOTE — CONSULTS
Consultation - Nephrology   Kayla Gallegos 60 y o  male MRN: [id number]  Unit/Bed#: [address] [place] [address] Encounter: [id number]    Inpatient consult to Nephrology  Consult performed by: Neo Gates MD  Consult ordered by: Ash Bliss MD          History of Present Illness   Physician Requesting Consult: Ash Bliss MD  Reason for Consult / Principal Problem:  Resistant hypertension/CKD stage 3    Hx and PE limited by:  Status post recent CVA    HPI: Kayla Gallegos is a 60y o  year old male with history of difficult-to-control hypertension/CKD stage 3 who presents with right-sided weakness as of 07/31/2019 found to have an acute lacunar infarct with right-sided hemiparesis  CT the head and CT of the head next without any acute abnormality or large vessel occlusion  His MRI of the brain demonstrated acute appearing lacunar infarct at the posterior left corona radiata He was out of the window of tPA  Initially admitted to the intensive care unit due to hypertensive emergency placed initially on a Cardene drip  Subsequently tapered off Cardene drip now on oral medications but having been found to have resistant hypertension  According to the patient's wife he has had very difficult to control blood pressure for the last 4-5 years  He has a history hypertension for at least 5 years probably a lot longer but never saw a physician  He readily admits to salt intake  He does not really follow his blood pressure at home  He denies any episodes of palpitations/diaphoresis/headaches all once  Occasional lightheadedness  In regards to his kidney function his baseline according to office notes is 1 38 mg/dL from his primary medical physician  His creatinine on presentation was 1 41 and has been as high as 1 53 now back down to 1 45  He denies any prior kidney disease, prostate disease, bladder problems, urinary tract infections, kidney stones, or excessive NSAID use    He denies any dysuria hematuria voiding symptoms or foamy urine  History obtained from the patient, his wife at the bedside, old records and the chart which I completely reviewed        General:  Apparently some chills but no fevers, usually eating fairly well  Cardiovascular:  No chest pain or shortness of breath or leg swelling  Respiratory:  No coughing or wheezing  Gastrointestinal:  No nausea vomiting or diarrhea  Genitourinary:  See HPI  Neuro:  See HPI  Rest of review of systems as completely reviewed with the patient are negative    Historical Information   Past Medical History:   Diagnosis Date    Hyperlipidemia     Hypertension      History reviewed  No pertinent surgical history  Social History   Social History     Substance and Sexual Activity   Alcohol Use Never    Frequency: Never     Social History     Substance and Sexual Activity   Drug Use Never     Social History     Tobacco Use   Smoking Status Never Smoker   Smokeless Tobacco Never Used     History reviewed  No pertinent family history      Meds/Allergies   all current active meds have been reviewed, current meds:   Current Facility-Administered Medications   Medication Dose Route Frequency    acetaminophen (TYLENOL) tablet 650 mg  650 mg Oral Q6H PRN    amLODIPine (NORVASC) tablet 10 mg  10 mg Oral Daily    aspirin (ECOTRIN LOW STRENGTH) EC tablet 81 mg  81 mg Oral Daily    atorvastatin (LIPITOR) tablet 80 mg  80 mg Oral Daily With Dinner    clopidogrel (PLAVIX) tablet 75 mg  75 mg Oral Daily    docusate sodium (COLACE) capsule 100 mg  100 mg Oral BID    doxazosin (CARDURA) tablet 2 mg  2 mg Oral HS    enoxaparin (LOVENOX) subcutaneous injection 40 mg  40 mg Subcutaneous Daily    hydrALAZINE (APRESOLINE) injection 10 mg  10 mg Intravenous Q4H PRN    hydrALAZINE (APRESOLINE) tablet 50 mg  50 mg Oral Q8H [address]    losartan (COZAAR) tablet 100 mg  100 mg Oral Daily    nebivolol (BYSTOLIC) tablet 20 mg  20 mg Oral HS    polyethylene glycol (MIRALAX) packet 17 g  17 g Oral Daily PRN    and PTA meds:    Medications Prior to Admission   Medication    amlodipine-olmesartan (TED) 10-40 MG    aspirin (ECOTRIN LOW STRENGTH) 81 mg EC tablet    Multiple Vitamin (MULTIVITAMIN) tablet    nebivolol (BYSTOLIC) 20 MG tablet    simvastatin (ZOCOR) 10 mg tablet    spironolactone (ALDACTONE) 25 mg tablet       No Known Allergies    Objective     Intake/Output Summary (Last 24 hours) at 8/4/2019 1333  Last data filed at 8/4/2019 0900  Gross per 24 hour   Intake 640 ml   Output 2450 ml   Net -1810 ml     Patient Vitals for the past 24 hrs:   BP Temp Temp src Pulse Resp SpO2 Weight   08/04/19 1208 163/83   67      08/04/19 0734 149/68 97 6 °F (36 4 °C) Oral 58 18 97 %    08/04/19 0600       95 6 kg (210 lb 12 2 oz)   08/04/19 0544 142/81   55      08/03/19 2355 152/78 97 5 °F (36 4 °C) Oral 74 18 96 %    08/03/19 2238 169/82         08/03/19 2043 (!) 182/90         08/03/19 2039 (!) 197/92 98 2 °F (36 8 °C) Oral 64 18 98 %    08/03/19 1608  98 °F (36 7 °C)  61 18 97 %    08/03/19 1553 145/90         08/03/19 1339 (!) 200/100             Invasive Devices:      Vitals:    08/04/19 1208   BP: 163/83   Pulse: 67   Resp:    Temp:    SpO2:      General:  Well-developed well-nourished in no acute distress lying in bed  Skin:  No acute rash  Eyes:  No scleral icterus and noninjected  ENT:  Normocephalic/atraumatic, mucous membranes moist  Neck:  Supple, no jugular venous distention, no carotid bruits, 1+ carotid upstroke, no thyromegaly  Back:  No CVA tenderness  Chest:  Clear to auscultation and percussion, good respiratory effort, no use of accessory respiratory muscles  CVS:  Regular rate and rhythm without a murmur rub or gallops appreciable  Abdomen:  Soft and nontender with normal bowel sounds, no hepatosplenomegaly or bruits appreciable and no masses  Extremities:  No clubbing, no cyanosis, no edema, 1+ dorsalis pedis pulses, no femoral bruits  Neuro:  Right hemiplegia no strength in the right upper extremity, minimal strength in the right lower extremity slight right facial droop  Psych:  Alert and oriented, teary at times    Current Weight: Weight - Scale: 95 6 kg (210 lb 12 2 oz)  First Weight: Weight - Scale: 99 3 kg (219 lb)    Lab Results:  I have personally reviewed pertinent labs  CBC:   Lab Results   Component Value Date    WBC 6 19 08/04/2019    RBC 5 14 08/04/2019     CMP:   Lab Results   Component Value Date     08/04/2019    CO2 23 08/04/2019    BUN 15 08/04/2019    CREATININE 1 45 (H) 08/04/2019    CALCIUM 8 6 08/04/2019    AST 29 07/31/2019    ALT 50 07/31/2019    ALKPHOS 79 07/31/2019    EGFR 59 08/04/2019     Phosphorus:   Lab Results   Component Value Date    PHOS 3 5 08/01/2019     Magnesium:   Lab Results   Component Value Date    MG 2 1 08/01/2019     Urinalysis:   Lab Results   Component Value Date    COLORU Light Yellow 08/02/2019    CLARITYU Clear 08/02/2019    SPECGRAV 1 015 08/02/2019    PHUR 6 0 08/02/2019    LEUKOCYTESUR Negative 08/02/2019    NITRITE Negative 08/02/2019    GLUCOSEU Negative 08/02/2019    KETONESU Negative 08/02/2019    BILIRUBINUR Negative 08/02/2019    BLOODU Negative 08/02/2019     BMP:   Lab Results   Component Value Date    SODIUM 139 08/04/2019    CO2 23 08/04/2019    BUN 15 08/04/2019    CREATININE 1 45 (H) 08/04/2019    CALCIUM 8 6 08/04/2019     Radiology review:   · Renal artery duplex:  No evidence of significant renal artery stenosis  · Chest x-ray:  No active disease  · MRI of the brain:  Acute appearing lacunar infarct at the posterior left corona radiata; old right basal ganglia lacunar infarct        Assessment/Plan   1  CKD stage 3:  Baseline creatinine approximately 1 4 mg/dL  Close to baseline at this time  Only minimal increase at most probably related to improvement of blood pressure control    The diagnosis is most compatible with hypertensive nephrosclerosis/accelerated hypertension-malignant hypertension/arteriolar nephrosclerosis  -baseline creatinine approximately 1 4-1 5  -UA:  2+ proteinuria, no RBCs or WBCs  The proteinuria may be in the setting of accelerated hypertension  I would obtain a formal urine protein creatinine ratio  -UPC to be obtained  -renal ultrasound: To be obtained  -monitor renal function  Treatment:  -treat hypertension please see below avoid hypoperfusion  -treat dyslipidemia  -avoid nephrotoxic agents such as NSAIDs  -low-sodium diet    2  Hypertensive emergency/chronic severe hypertension: We need to rule out secondary causes including primary aldosterone state/pheochromocytoma/possible obstructive sleep apnea  Thyroid profile is normal   We cannot assess the aldosterone axis at this time because he has been recently on spironolactone  We need to wait 6 weeks for a washout period  Recommend:  Workup:  · Aldosterone/renin ratio in 6 weeks  · Plasma free metanephrines  · Renal artery duplex negative  · Thyroid profile negative  · Obtain overnight pulse oximetry for the initial screening of obstructive sleep apnea  Treatment:  · Amlodipine 10 mg daily with hold parameters for systolic blood pressure greater than 130  · Doxazosin has been placed for some urinary voiding symptoms plus hypertension place hold parameters  · Hydralazine 50 mg every 8 hours with hold parameters  · Losartan 100 mg daily  · Bystolic 20 mg daily  · I would add a diuretic such as torsemide 10 mg daily which may be the missing ingredient going forward  · Add amiloride 10 mg daily given borderline hypokalemia and hypertension  Once we have assessed his adrenal/aldosterone axis in 6 weeks we can then change to spironolactone  · GOAL BLOOD PRESSURE IS TO SLOWLY DECREASE AT THIS TIME TO APPROXIMATELY 154-372 SYSTOLIC  EVENTUALLY LESS THAN 130/80 AND POSSIBLY LOWER IF SIGNIFICANT PROTEINURIA SUCH AS GREATER THAN 1 G       Case discussed with the hospitalist   Ray Lantigua      Portions of the record may have been created with voice recognition software   Occasional wrong word or "sound a like" substitutions may have occurred due to the inherent limitations of voice recognition software   Read the chart carefully and recognize, using context, where substitutions have occurred

## 2019-08-04 NOTE — ASSESSMENT & PLAN NOTE
· Frothy soft tissue abnormality in the nondependent portion of the trachea with imaging features favoring aspiration     · Differential diagnosis could include tracheal polyp or other soft tissue lesion    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  · Follow-up chest x-ray without any acute abnormality,   · Aspiration precautions  · Patient passed dysphagia eval

## 2019-08-04 NOTE — PHYSICAL THERAPY NOTE
PT TREATMENT     08/04/19 0920   Pain Assessment   Pain Assessment No/denies pain   Restrictions/Precautions   Other Precautions Chair Alarm; Bed Alarm; Fall Risk  (R sided weakness)   General   Chart Reviewed Yes   Family/Caregiver Present Yes   Cognition   Arousal/Participation Cooperative   Subjective   Subjective Patient anxious to get to rehab and improve with strength and function   Transfers   Sit to Stand 3  Moderate assistance   Additional items Assist x 1;Verbal cues   Stand to Sit 3  Moderate assistance   Additional items Assist x 1;Verbal cues   Ambulation/Elevation   Gait Assistance 2  Maximal assist  (max of 1 and min of another)   Additional items Assist x 2;Verbal cues; Tactile cues   Assistive Device Francisco J-walker   Distance 20 feet with max assist for weight shifting and advancing R LE as well as knee blocking to maintain knee extension for weight bearing   Exercises   Hip Flexion Sitting;20 reps;AROM;AAROM; Bilateral   Knee AROM Long Arc Quad Sitting;20 reps;AAROM;AROM; Bilateral   Heel Cord Stretch Sitting;5 reps;PROM; Right   Marching Sitting;20 reps;AAROM; Bilateral   Balance training  sitting and standing balance and weight shifting activity completed sitting on chair edge without lumbar support  Assessment   Assessment patient cooperative and motivated and demonstrating improving gait endurance with assist of 2 therapists and will benefit from continued PT with progression as tolerated   Plan   Treatment/Interventions ADL retraining;Functional transfer training;LE strengthening/ROM; Therapeutic exercise; Endurance training;Patient/family training;Equipment eval/education; Bed mobility;Gait training; Compensatory technique education   PT Frequency Once a day   Recommendation   Recommendation Short-term skilled PT   Licensure   [place] License Number  Dave Bell PT [id number]

## 2019-08-04 NOTE — ASSESSMENT & PLAN NOTE
Likely secondary to CVA and constipation  Reported good bowel movement on 08/03  Urinary retention protocol, able to void when encouraged  Continue to monitor PVR  Bowel regimen  Started on flomax  Urology consulted, patient will need follow up after discharge

## 2019-08-04 NOTE — PROGRESS NOTES
Progress Note - Cardiology   [facility]     Aneudy Kelley 60 y o  male MRN: [id number]  : 1955  Unit/Bed#: [address] 408-01 Encounter: [id number]    Assessment and Plan:     1  Acute CVA   Involving left-sided infarct of coronary radiata and chronic right basal ganglia infarct  Had discussion with Neurology and Medicine  Most likely etiology is extremely uncontrolled hypertension  Blood pressure is slowly improving  Management as per Neurology     2  Hypertensive emergency with evidence of target organ damage as manifested by stroke and elevated troponin  Blood pressure has significantly improved  Continue current medication  Will increase hydralazine to 50 mg q 8h hourly  Nephrology note noted   Patient was added low-dose Demadex as well as amiloride  Workup for secondary hypertension as an outpatient           3  Non ST elevation MI   Secondary to extremely high blood pressure and stroke   Troponin has been flat  Nuclear stress test shows no ischemia  Normal perfusion EF low normal      4  Abnormal EKG with LVH and strain  Lexiscan stress test and echo finding discussed with the family      5  Longstanding history of uncontrolled hypertension  Now much improved  Blood pressure is now around 150-160  Nephrology note noted  Secondary hypertension workup ordered by nephrology      6  Dyslipidemia   continue high-intensity statin  Follow-up LFTs     7  Moderate to severe L left ventricle hypertrophy with normal LV systolic function and no significant valvular disease and grade 2 diastolic dysfunction consistent with hypertensive heart disease  8  Depression  Patient was crying  Worried about his outcome  May need supportive care  His plan to have rehab therapy  Continue current Rx  Discussed with medical team Nephrology and patient and patient's wife    Subjective / Objective:   Patient seen and evaluated  No new complaints    Blood pressure is slowly improving  Serum creatinine remained stable  No chest pain no shortness of breath no dizziness  Nephrology note noted  Patient is now being workup for secondary hypertension  Vitals: Blood pressure 163/83, pulse 67, temperature 97 6 °F (36 4 °C), temperature source Oral, resp  rate 18, height 6' 1" (1 854 m), weight 95 6 kg (210 lb 12 2 oz), SpO2 97 %  Vitals:    08/03/19 0600 08/04/19 0600   Weight: 97 5 kg (215 lb) 95 6 kg (210 lb 12 2 oz)     Body mass index is 27 81 kg/m²  BP Readings from Last 3 Encounters:   08/04/19 163/83     Orthostatic Blood Pressures      Most Recent Value   Blood Pressure  163/83 filed at 08/04/2019 1208   Patient Position - Orthostatic VS  Sitting filed at 08/04/2019 1208        I/O       08/01 0701 - 08/02 0700 08/02 0701 - 08/03 0700 08/03 0701 - 08/04 0700    P  O  840 300 480    I V  (mL/kg)  999 (10 2)     Total Intake(mL/kg) 840 (8 8) 1299 (13 3) 480 (4 9)    Urine (mL/kg/hr) 1700 (0 7) 1650 (0 7) 575 (1 1)    Total Output 1700 1650 575    Net -860 -351 -95               Invasive Devices     Peripheral Intravenous Line            Peripheral IV 07/31/19 Right Antecubital 4 days                  Intake/Output Summary (Last 24 hours) at 8/4/2019 1538  Last data filed at 8/4/2019 0900  Gross per 24 hour   Intake 640 ml   Output 1700 ml   Net -1060 ml         Physical Exam:   Physical Exam   Constitutional: He is oriented to person, place, and time  He appears well-developed and well-nourished  No distress  HENT:   Head: Normocephalic and atraumatic  Eyes: Pupils are equal, round, and reactive to light  Neck: Neck supple  No JVD present  No tracheal deviation present  No thyromegaly present  Cardiovascular: Normal rate, regular rhythm, S1 normal and S2 normal  Exam reveals no gallop, no S3, no S4, no distant heart sounds and no friction rub  Murmur heard  Systolic (ejection) murmur is present with a grade of 2/6    S1-S2 regular with 2/6 systolic murmur S4 present Pulmonary/Chest: Effort normal and breath sounds normal  No respiratory distress  He has no wheezes  He has no rales  He exhibits no tenderness  Abdominal: Soft  Bowel sounds are normal  He exhibits no distension  There is no tenderness  Musculoskeletal: He exhibits no edema or deformity  Neurological: He is alert and oriented to person, place, and time  No new neurological loss,  Still cannot move right arm  Skin: Skin is warm and dry  No rash noted  He is not diaphoretic  No pallor  Psychiatric: He has a normal mood and affect   His behavior is normal  Judgment normal             Medications/ Allergies:       Current Facility-Administered Medications:  acetaminophen 650 mg Oral Q6H PRN Janina Brumfield MD   AMILoride 10 mg Oral Daily Sajan Caba MD   [START ON 8/5/2019] amLODIPine 10 mg Oral Daily Sajan Caba MD   aspirin 81 mg Oral Daily Janina Brumfield MD   atorvastatin 80 mg Oral Daily With Daniel Diaz MD   clopidogrel 75 mg Oral Daily Janina Brumfield MD   docusate sodium 100 mg Oral BID Janina Brumfield MD   doxazosin 2 mg Oral HS Sajan Caba MD   enoxaparin 40 mg Subcutaneous Daily Janina Brumfield MD   hydrALAZINE 10 mg Intravenous Q4H PRN Janina Brumfield MD   hydrALAZINE 50 mg Oral [facility] Sajan Caba MD   [START ON 8/5/2019] losartan 100 mg Oral Daily Sajan Caba MD   nebivolol 20 mg Oral HS Janina Brumfield MD   polyethylene glycol 17 g Oral Daily PRN Janina Brumfield MD   torsemide 10 mg Oral Daily Sajan Caba MD       acetaminophen 650 mg Q6H PRN   hydrALAZINE 10 mg Q4H PRN   polyethylene glycol 17 g Daily PRN     No Known Allergies    VTE Pharmacologic Prophylaxis:   Lovenox    Labs:   Troponins:  Results from last 7 days   Lab Units 07/31/19  1557 07/31/19  1324   TROPONIN I ng/mL 0 30* 0 32*       CBC with diff:  Results from last 7 days   Lab Units 08/04/19  0527 08/03/19  0450 08/02/19  0514 08/01/19  0530 07/31/19  1324   WBC Thousand/uL 6 19 6 51 8 35 8  96 7 16   HEMOGLOBIN g/dL 15 9 15 6 17 2* 16 3 16 4   HEMATOCRIT % 48 5 47 0 52 1* 48 6 50 2*   MCV fL 94 94 94 93 94   PLATELETS Thousands/uL 226 217 241 229 221   MCH pg 30 9 31 1 31 2 31 2 30 8   MCHC g/dL 32 8 33 2 33 0 33 5 32 7   RDW % 14 1 14 3 14 1 13 7 13 6   MPV fL 11 8 12 1 11 7 11 6 12 1   NRBC AUTO /100 WBCs 0 0 0 0  --        CMP:  Results from last 7 days   Lab Units 08/04/19  0527 08/03/19  0450 08/02/19  0514 08/01/19  0530 07/31/19  1324   SODIUM mmol/L 139 138 139 140 140   POTASSIUM mmol/L 3 7 3 4* 4 0 3 7 3 7   CHLORIDE mmol/L 106 105 103 103 103   CO2 mmol/L 23 22 29 26 26   ANION GAP mmol/L 10 11 7 11 11   BUN mg/dL 15 16 18 13 13   CREATININE mg/dL 1 45* 1 44* 1 53* 1 43* 1 41*   CALCIUM mg/dL 8 6 8 5 8 9 9 0 8 9   AST U/L  --   --   --   --  29   ALT U/L  --   --   --   --  50   ALK PHOS U/L  --   --   --   --  79   TOTAL PROTEIN g/dL  --   --   --   --  7 8   ALBUMIN g/dL  --   --   --   --  3 9   TOTAL BILIRUBIN mg/dL  --   --   --   --  0 80   EGFR ml/min/1 73sq m 59 60 56 60 61       Magnesium:  Results from last 7 days   Lab Units 08/01/19  0530   MAGNESIUM mg/dL 2 1     Coags:  Results from last 7 days   Lab Units 07/31/19  1324   PTT seconds 29   INR  1 09     TSH:  Results from last 7 days   Lab Units 08/01/19  0530   TSH 3RD GENERATON uIU/mL 1 244     Lipid Profile:  Results from last 7 days   Lab Units 08/01/19  0530   CHOLESTEROL mg/dL 193   TRIGLYCERIDES mg/dL 97   HDL mg/dL 44   LDL CALC mg/dL 130*     Hgb A1c:  Results from last 7 days   Lab Units 08/01/19  0530   HEMOGLOBIN A1C % 5 8         Imaging & Testing   I have personally reviewed pertinent reports  X-ray Chest 1 View Portable    Result Date: 7/31/2019  Narrative: CHEST INDICATION:   Stroke  COMPARISON:  None EXAM PERFORMED/VIEWS:  XR CHEST PORTABLE FINDINGS: Cardiomediastinal silhouette appears unremarkable  The lungs are clear  No pneumothorax or pleural effusion   Osseous structures appear within normal limits for patient age  Impression: No acute cardiopulmonary disease  Workstation performed: [id number]     Mri Brain Wo Contrast    Result Date: 7/31/2019  Narrative: MRI BRAIN WITHOUT CONTRAST INDICATION: CVA  COMPARISON:   None  TECHNIQUE:  Sagittal T1, axial T2, axial FLAIR, axial T1, axial Banner and axial diffusion imaging  IMAGE QUALITY:  Diagnostic  FINDINGS: BRAIN PARENCHYMA:  There is no discrete mass, mass effect or midline shift  There is no intracranial hemorrhage  There is a small area of restricted diffusion measuring 15 x 8 mm at the posterior left corona radiata compatible with an acute focus of ischemia  There are are moderate periventricular and subcortical foci of white matter T2 hyperintensity which is nonspecific and most likely related to chronic small vessel ischemic changes  A few of these white matter lesions when perpendicular to the  callosal septal interface raising the possibility of demyelinating disease  There is an old right basal ganglia lacunar infarct with minimal hemosiderin deposition  VENTRICLES:  The ventricles are normal in size and contour  SELLA AND PITUITARY GLAND:  Normal  ORBITS:  Normal  PARANASAL SINUSES:  There is mucosal thickening of the left maxillary sinus  There is minimal mucosal thickening of the bilateral ethmoid air cells  VASCULATURE:  Evaluation of the major intracranial vasculature demonstrates appropriate flow voids  CALVARIUM AND SKULL BASE:  Normal  EXTRACRANIAL SOFT TISSUES:  Normal      Impression: Acute appearing lacunar infarct at the posterior left corona radiata  Moderate periventricular and subcortical foci of white matter T2 hyperintensity which is nonspecific and most likely related to chronic small vessel ischemic changes  A few of these white matter lesions when perpendicular to the callosal septal interface raising the possibility of demyelinating disease  Other less likely etiologies include vasculitis, Lyme and migraines   Old right basal ganglia lacunar infarct  I personally discussed this study with Kenzie Karimi on 7/31/2019 at 5:05 PM  Workstation performed: [id number]     Ct Stroke Alert Brain    Result Date: 7/31/2019  Narrative: CT BRAIN - STROKE ALERT PROTOCOL INDICATION:   Right-sided weakness and limb ataxia  Symptoms started 5:00 PM last evening  Dysmetria  COMPARISON:  None  TECHNIQUE:  CT examination of the brain was performed  In addition to axial images, coronal reformatted images were created and submitted for interpretation  Radiation dose length product (DLP) for this visit:  972 mGy-cm   This examination, like all CT scans performed in the [facility], was performed utilizing techniques to minimize radiation dose exposure, including the use of iterative reconstruction and automated exposure control  IMAGE QUALITY:  Diagnostic  FINDINGS:  PARENCHYMA:  Decreased attenuation is noted in the supratentorial white matter demonstrating an appearance most consistent with moderate microangiopathic change  No intracranial mass, mass effect or midline shift  No acute intracranial hemorrhage  No CT signs of acute infarction  Chronic appearing right basal ganglia lacunar infarctions  Normal intracranial vasculature  VENTRICLES AND EXTRA-AXIAL SPACES:  Normal for patient's age  VISUALIZED ORBITS AND PARANASAL SINUSES:  Mild left and minimal right maxillary mucosal thickening  CALVARIUM AND EXTRACRANIAL SOFT TISSUES:   Normal      Impression: No acute intracranial hemorrhage, mass effect or edema  Microangiopathic changes  Chronic appearing right basal ganglia lacunar infarctions  Findings were directly discussed with Dr Fernanda Menchaca on 7/31/2019 1:32 PM  Workstation performed: [id number]     Vas Renal Artery Complete    Result Date: 8/2/2019  Narrative:  [facility] REPORT CLINICAL: Indications: Patient presents to evaluate the renal arteries secondary to uncontrolled HTN   Patient is currently on 4 medications for Blood pressure  Risk Factors: The patient has history of HTN and HLD  The patient current BMI is 27 84, Weight is 211 lb and height is 73 in  Clinical: Right Pressure:  153/79 mm Hg, Left Pressure:  / mm Hg  FINDINGS:  Unilateral       PSV (cm/s)  EDV (cm/s)  Sup-Jennie Ao              103              Sup Renal Aorta          89              Celiac                  142          27  Prox   SMA               138               Right         Impression  PSV (cm/s)  EDV (cm/s)   RAR    RI  Kidney (cm)  Ostial Renal                      89          27  0 87  0 70               Prox Renal                        79          25  0 77  0 68               Mid Renal                         60          15  0 58  0 75               Dist Renal                        59          17  0 57  0 71               Kidney                                                              11 50  Hilum 1                           19           4        0 76               Mid Pole                          18           6        0 66               Hilum 3                           18           6        0 68               Renal         Patent                                                        Left          Impression  PSV (cm/s)  EDV (cm/s)   RAR    RI  Kidney (cm)  Ostial Renal                      95          30  0 92  0 68               Prox Renal                       115          35  1 11  0 70               Mid Renal                         61          15  0 59  0 75               Dist Renal                        48          15  0 47  0 68               Kidney                                                              10 30  Hilum 1                           20           7        0 66               Mid Pole                          19           7        0 60               Hilum 3                           20           7        0 68               Renal         Patent CONCLUSION: Impression The abdominal aorta is widely patent and normal caliber  RIGHT RENAL: No evidence of significant arterial occlusive disease in the main renal artery  Patent renal vein  Renovascular resistive index of 0 76  Renal/Aorta Ratio: 0 87  The Kidney measures 11 5 cm  LEFT RENAL: No evidence of significant arterial occlusive disease in the main renal artery  Patent renal vein  Renovascular resistive index of 0 68  Renal/Aorta Ratio: 1 11  The Kidney measures 10 3 cm  MESENTERIC: Celiac and superior mesenteric arteries are patent  SIGNATURE: Electronically Signed by: Isha Hills MD, RPVI on 2019-08-02 04:15:51 PM    Cta Stroke Alert (head/neck)    Result Date: 7/31/2019  Narrative: CTA NECK AND BRAIN WITH CONTRAST INDICATION: Right-sided weakness and limb ataxia COMPARISON:   Concurrent head CT TECHNIQUE:   Post contrast imaging was performed after administration of iodinated contrast through the neck and brain  Post contrast axial 0 625 mm images timed to opacify the arterial system  3D rendering was performed on an independent workstation  MIP reconstructions performed  Coronal reconstructions were performed of the noncontrast portion of the brain  Radiation dose length product (DLP) for this visit:  382 mGy-cm   This examination, like all CT scans performed in the [facility], was performed utilizing techniques to minimize radiation dose exposure, including the use of iterative reconstruction and automated exposure control  IV Contrast:  85 mL of iohexol (OMNIPAQUE)  IMAGE QUALITY:   Diagnostic FINDINGS: CERVICAL VASCULATURE AORTIC ARCH AND GREAT VESSELS:  Mild athersclerotic disease of the arch, proximal great vessels and visualized subclavian vessels  No significant stenosis  RIGHT VERTEBRAL ARTERY CERVICAL SEGMENT:  Normal origin  The vessel is normal in caliber throughout the neck  LEFT VERTEBRAL ARTERY CERVICAL SEGMENT:  Normal origin   The vessel is normal in caliber throughout the neck  RIGHT EXTRACRANIAL CAROTID SEGMENT:  Mild atherosclerotic disease of the distal common carotid artery and proximal cervical internal carotid artery without significant stenosis compared to the more distal ICA  LEFT EXTRACRANIAL CAROTID SEGMENT:  Mild atherosclerotic disease of the distal common carotid artery and proximal cervical internal carotid artery without significant stenosis compared to the more distal ICA  NASCET criteria was used to determine the degree of internal carotid artery diameter stenosis  INTRACRANIAL VASCULATURE INTERNAL CAROTID ARTERIES:  Normal enhancement of the intracranial portions of the internal carotid arteries  Normal ophthalmic artery origins  Normal ICA terminus  ANTERIOR CIRCULATION:  Symmetric A1 segments and anterior cerebral arteries with normal enhancement  Normal anterior communicating artery  MIDDLE CEREBRAL ARTERY CIRCULATION:  M1 segment and middle cerebral artery branches demonstrate normal enhancement bilaterally  DISTAL VERTEBRAL ARTERIES:  Normal distal vertebral arteries  Posterior inferior cerebellar artery origins are normal  Normal vertebral basilar junction  BASILAR ARTERY:  Basilar artery is normal in caliber  Normal superior cerebellar arteries  POSTERIOR CEREBRAL ARTERIES: Both posterior cerebral arteries arises from the basilar tip  Both arteries demonstrate normal enhancement  Normal posterior communicating arteries  DURAL VENOUS SINUSES:  Not well opacified on this arterial phase scan NON VASCULAR ANATOMY BONY STRUCTURES:  Scattered spondylotic changes noted  Ossification of posterior longitudinal ligament C4-C7 noted  This ossification causes moderate to severe spinal stenosis  SOFT TISSUES OF THE NECK:  Unremarkable   THORACIC INLET:  There is appearance frothy soft tissue abnormality in the nondependent portion of the trachea on image 267, series 2 likely aspirated material   Tracheal polyp could be considered in the differential diagnosis although the presence of air lucencies admixed with soft tissue density argues for aspiration  Incidental note is made of a right paratracheal air cyst posteriorly on image 227, series 2 at the thoracic inlet  Tiny 2 mm subpleural nodule image 253, series 2 in the left upper lobe noted  Impression: 1  No hemodynamically significant stenosis in the major arteries of the neck  2   No intracranial aneurysm or major intracranial arterial stenosis  3   Frothy soft tissue abnormality in the nondependent portion of the trachea with imaging features favoring aspiration  Differential diagnosis could include tracheal polyp or other soft tissue lesion  4   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  Findings were directly discussed with Dr Rosanne Gomez on 2019 1:32 PM  Workstation performed: [id number]        EKG / Monitor: Personally reviewed  Normal sinus rhythm LVH no change from old EKG      Cardiac testing:   Results for orders placed during the hospital encounter of 19   Echo complete with contrast if indicated    Narrative [address]  [address] [address]  [phone]    Transthoracic Echocardiogram  2D, M-mode, Doppler, and Color Doppler    Study date:  01-Aug-2019    Patient: Cecelia Rodriguez  MR number: [id number]  Account number: [de-identified]  : 1955  Age: 60 years  Gender: Male  Status: Inpatient  Location: Bedside  Height: 73 in  Weight: 210 5 lb  BP: 208/ 110 mmHg    Indications: Cerebral Thrombosis    Diagnoses: I67 9 - Cerebrovascular disease, unspecified    Sonographer:  JUSTIN Juan  Primary Physician:  Lincoln De Leon MD  Referring Physician:  Olivia Hilton MD  [facility]  [facility]  Interpreting Physician:  Celeste Craig MD    SUMMARY    LEFT VENTRICLE:  Systolic function was normal  Ejection fraction was estimated in the range of 60 % to 65 % to be 65 %   There were no regional wall motion abnormalities  Wall thickness was moderately increased  There was severe concentric hypertrophy  Doppler parameters were consistent with abnormal left ventricular relaxation (grade 1 diastolic dysfunction)  Doppler parameters were consistent with high ventricular filling pressure  LEFT ATRIUM:  The atrium was mildly dilated  ATRIAL SEPTUM:  No defect or patent foramen ovale was identified by bubble study  MITRAL VALVE:  There was trace regurgitation  TRICUSPID VALVE:  There was mild regurgitation  Estimated peak PA pressure was 35 mmHg  IVC, HEPATIC VEINS:  The respirophasic change in diameter was more than 50%  HISTORY: PRIOR HISTORY: HTN, Hyperlipidemia    PROCEDURE: The procedure was performed at the bedside  This was a routine study  The transthoracic approach was used  The study included complete 2D imaging, M-mode, complete spectral Doppler, and color Doppler  The heart rate was 70 bpm,  at the start of the study  Images were not obtained from the subcostal acoustic windows  Intravenous contrast (agitated saline, 10 ml) was administered to evaluate shunting  Image quality was adequate  LEFT VENTRICLE: Size was normal  Systolic function was normal  Ejection fraction was estimated in the range of 60 % to 65 % to be 65 %  There were no regional wall motion abnormalities  Wall thickness was moderately increased  There was  severe concentric hypertrophy  DOPPLER: Doppler parameters were consistent with abnormal left ventricular relaxation (grade 1 diastolic dysfunction)  Doppler parameters were consistent with high ventricular filling pressure  RIGHT VENTRICLE: The size was normal  Systolic function was normal     LEFT ATRIUM: The atrium was mildly dilated  ATRIAL SEPTUM: No defect or patent foramen ovale was identified by bubble study  RIGHT ATRIUM: Size was normal     MITRAL VALVE: There was normal leaflet separation   DOPPLER: The transmitral velocity was within the normal range  There was no evidence for stenosis  There was trace regurgitation  AORTIC VALVE: The valve was trileaflet  Leaflets exhibited mildly increased thickness and normal cuspal separation  DOPPLER: Transaortic velocity was minimally increased  There was no evidence for stenosis  There was no regurgitation  TRICUSPID VALVE: DOPPLER: There was mild regurgitation  Estimated peak PA pressure was 35 mmHg  PULMONIC VALVE: DOPPLER: There was no significant regurgitation  PERICARDIUM: There was no thickening or calcification  There was no pericardial effusion  AORTA: The root exhibited normal size  SYSTEMIC VEINS: IVC: The inferior vena cava was normal in size  The respirophasic change in diameter was more than 50%  SYSTEM MEASUREMENT TABLES    2D mode  AoR Diam 2D: 3 7 cm  LA Diam (2D): 4 cm  LA/Ao (2D): 1 08  FS (2D Teich): 28 8 %  IVSd (2D): 1 52 cm  LVDEV: 99 8 cmï¾³  LVESV: 44 5 cmï¾³  LVIDd(2D): 4 65 cm  LVISd (2D): 3 31 cm  LVPWd (2D): 1 65 cm  SV (Teich): 55 3 cmï¾³    Apical four chamber  LVEF A4C: 55 %    Unspecified Scan Mode  MV Peak A Jc: 816 mm/s  MV Peak E Jc  Mean: 569 mm/s  MVA (PHT): 3 93 cmï¾²  PHT: 56 ms  Max P mm[Hg]  V Max: 2730 mm/s  Vmax: 2730 mm/s  RA Area: 14 9 cmï¾²  RA Volume: 35 6 cmï¾³  TAPSE: 2 1 cm    [employer] Accredited Echocardiography Laboratory    Prepared and electronically signed by    Donovan Velazquez MD  Signed 01-Aug-2019 10:03:30         Dr Donovan Velazquez MD [facility]      "This note has been constructed using a voice recognition system  Therefore there may be syntax, spelling, and/or grammatical errors   Please call if you have any questions  "

## 2019-08-04 NOTE — PLAN OF CARE
Problem: Prexisting or High Potential for Compromised Skin Integrity  Goal: Skin integrity is maintained or improved  Description  INTERVENTIONS:  - Identify patients at risk for skin breakdown  - Assess and monitor skin integrity  - Assess and monitor nutrition and hydration status  - Monitor labs (i e  albumin)  - Assess for incontinence   - Turn and reposition patient  - Assist with mobility/ambulation  - Relieve pressure over bony prominences  - Avoid friction and shearing  - Provide appropriate hygiene as needed including keeping skin clean and dry  - Evaluate need for skin moisturizer/barrier cream  - Collaborate with interdisciplinary team (i e  Nutrition, Rehabilitation, etc )   - Patient/family teaching  8/3/2019 2000 by Jacky Yeh RN  Outcome: Progressing  8/3/2019 1959 by Jacky Yeh RN  Outcome: Progressing     Problem: NEUROSENSORY - ADULT  Goal: Achieves stable or improved neurological status  Description  INTERVENTIONS  - Monitor and report changes in neurological status  - Initiate measures to prevent increased intracranial pressure  - Maintain blood pressure and fluid volume within ordered parameters to optimize cerebral perfusion  - Monitor temperature, glucose, and sodium or any other associated labs   Initiate appropriate interventions as ordered  - Monitor for seizure activity   - Administer anti-seizure medications as ordered  8/3/2019 2000 by Jacky Yeh RN  Outcome: Progressing  8/3/2019 1959 by Jacky Yeh RN  Outcome: Progressing  Goal: Achieves maximal functionality and self care  Description  INTERVENTIONS  - Monitor swallowing and airway patency with patient fatigue and changes in neurological status  - Encourage and assist patient to increase activity and self care with guidance from rehab services  - Encourage visually impaired, hearing impaired and aphasic patients to use assistive/communication devices  8/3/2019 2000 by Jacky Yeh RN  Outcome: Progressing  8/3/2019 65 by Dave Rodriguez RN  Outcome: Progressing     Problem: MUSCULOSKELETAL - ADULT  Goal: Maintain or return mobility to safest level of function  Description  INTERVENTIONS:  - Assess patient's ability to carry out ADLs; assess patient's baseline for ADL function and identify physical deficits which impact ability to perform ADLs (bathing, care of mouth/teeth, toileting, grooming, dressing, etc )  - Assess/evaluate cause of self-care deficits   - Assess range of motion  - Assess patient's mobility; develop plan if impaired  - Assess patient's need for assistive devices and provide as appropriate  - Encourage maximum independence but intervene and supervise when necessary  - Involve family in performance of ADLs  - Assess for home care needs following discharge   - Request OT consult to assist with ADL evaluation and planning for discharge  - Provide patient education as appropriate  8/3/2019 2000 by Dave Rodriguez RN  Outcome: Progressing  8/3/2019 1959 by Dave Rodriguez RN  Outcome: Progressing  Goal: Maintain proper alignment of affected body part  Description  INTERVENTIONS:  - Support, maintain and protect limb and body alignment  - Provide pt/fam with appropriate education  8/3/2019 2000 by Dave Rodriguez RN  Outcome: Progressing  8/3/2019 1959 by Dave Rodriguez RN  Outcome: Progressing     Problem: CARDIOVASCULAR - ADULT  Goal: Maintains optimal cardiac output and hemodynamic stability  Description  INTERVENTIONS:  - Monitor I/O, vital signs and rhythm  - Monitor for S/S and trends of decreased cardiac output i e  bleeding, hypotension  - Administer and titrate ordered vasoactive medications to optimize hemodynamic stability  - Assess quality of pulses, skin color and temperature  - Assess for signs of decreased coronary artery perfusion - ex   Angina  - Instruct patient to report change in severity of symptoms  8/3/2019 2000 by Dave Rodriguez RN  Outcome: Progressing  8/3/2019 1959 by Dave Rodriguez RN  Outcome: Progressing     Problem: Potential for Falls  Goal: Patient will remain free of falls  Description  INTERVENTIONS:  - Assess patient frequently for physical needs  -  Identify cognitive and physical deficits and behaviors that affect risk of falls  -  [place] fall precautions as indicated by assessment   - Educate patient/family on patient safety including physical limitations  - Instruct patient to call for assistance with activity based on assessment  - Modify environment to reduce risk of injury  - Consider OT/PT consult to assist with strengthening/mobility  8/3/2019 2000 by Robbie Little RN  Outcome: Progressing  8/3/2019 1959 by Robbie Little RN  Outcome: Progressing     Problem: Neurological Deficit  Goal: Neurological status is stable or improving  Description  Interventions:  - Monitor and assess patient's level of consciousness, motor function, sensory function, and level of assistance needed for ADLs  - Monitor and report changes from baseline  Collaborate with interdisciplinary team to initiate plan and implement interventions as ordered  - Provide and maintain a safe environment  - Utilize seizure precautions  - Utilize fall precautions  - Utilize aspiration precautions  - Utilize bleeding precautions  8/3/2019 2000 by Robbie Little RN  Outcome: Progressing  8/3/2019 1959 by Robbie Little RN  Outcome: Progressing     Problem: Activity Intolerance/Impaired Mobility  Goal: Mobility/activity is maintained at optimum level for patient  Description  Interventions:  - Assess and monitor patient  barriers to mobility and need for assistive/adaptive devices  - Assess patient's emotional response to limitations  - Collaborate with interdisciplinary team and initiate plans and interventions as ordered  - Encourage independent activity per ability   - Maintain proper body alignment  - Perform active/passive rom as tolerated/ordered    - Plan activities to conserve energy   - Turn patient  8/3/2019 2000 by Jorge Formna RN  Outcome: Progressing  8/3/2019 1959 by Jorge Forman RN  Outcome: Progressing     Problem: Communication Impairment  Goal: Ability to express needs and understand communication  Description  Assess patient's communication skills and ability to understand information  Patient will demonstrate use of effective communication techniques, alternative methods of communication and understanding even if not able to speak  - Encourage communication and provide alternate methods of communication as needed  - Collaborate with case management/ for discharge needs  - Include patient/family/caregiver in decisions related to communication  8/3/2019 2000 by Jorge Forman RN  Outcome: Progressing  8/3/2019 1959 by Jorge Forman RN  Outcome: Progressing     Problem: Potential for Aspiration  Goal: Non-ventilated patient's risk of aspiration is minimized  Description  Assess and monitor vital signs, respiratory status, and labs (WBC)  Monitor for signs of aspiration (tachypnea, cough, rales, wheezing, cyanosis, fever)  - Assess and monitor patient's ability to swallow  - Place patient up in chair to eat if possible  - HOB up at 90 degrees to eat if unable to get patient up into chair   - Supervise patient during oral intake  - Instruct patient to take small bites  - Instruct patient to take small single sips when taking liquids  - Follow patient-specific strategies generated by speech pathologist   8/3/2019 2000 by Jorge Forman RN  Outcome: Progressing  8/3/2019 1959 by Jorge Forman RN  Outcome: Progressing     Problem: Nutrition/Hydration-ADULT  Goal: Nutrient/Hydration intake appropriate for improving, restoring or maintaining nutritional needs  Description  Monitor and assess patient's nutrition/hydration status for malnutrition (ex- brittle hair, bruises, dry skin, pale skin and conjunctiva, muscle wasting, smooth red tongue, and disorientation)  Collaborate with interdisciplinary team and initiate plan and interventions as ordered  Monitor patient's weight and dietary intake as ordered or per policy  Utilize nutrition screening tool and intervene per policy  Determine patient's food preferences and provide high-protein, high-caloric foods as appropriate       INTERVENTIONS:  - Monitor oral intake, urinary output, labs, and treatment plans  - Assess nutrition and hydration status and recommend course of action  - Evaluate amount of meals eaten  - Assist patient with eating if necessary   - Allow adequate time for meals  - Recommend/ encourage appropriate diets, oral nutritional supplements, and vitamin/mineral supplements  - Order, calculate, and assess calorie counts as needed  - Recommend, monitor, and adjust tube feedings and TPN/PPN based on assessed needs  - Assess need for intravenous fluids  - Provide specific nutrition/hydration education as appropriate  - Include patient/family/caregiver in decisions related to nutrition  8/3/2019 2000 by Evelyn Price, RN  Outcome: Progressing  8/3/2019 1959 by Evelyn Price, RN  Outcome: Progressing

## 2019-08-04 NOTE — PLAN OF CARE
Problem: Prexisting or High Potential for Compromised Skin Integrity  Goal: Skin integrity is maintained or improved  Description  INTERVENTIONS:  - Identify patients at risk for skin breakdown  - Assess and monitor skin integrity  - Assess and monitor nutrition and hydration status  - Monitor labs (i e  albumin)  - Assess for incontinence   - Turn and reposition patient  - Assist with mobility/ambulation  - Relieve pressure over bony prominences  - Avoid friction and shearing  - Provide appropriate hygiene as needed including keeping skin clean and dry  - Evaluate need for skin moisturizer/barrier cream  - Collaborate with interdisciplinary team (i e  Nutrition, Rehabilitation, etc )   - Patient/family teaching  Outcome: Progressing     Problem: NEUROSENSORY - ADULT  Goal: Achieves stable or improved neurological status  Description  INTERVENTIONS  - Monitor and report changes in neurological status  - Initiate measures to prevent increased intracranial pressure  - Maintain blood pressure and fluid volume within ordered parameters to optimize cerebral perfusion  - Monitor temperature, glucose, and sodium or any other associated labs   Initiate appropriate interventions as ordered  - Monitor for seizure activity   - Administer anti-seizure medications as ordered  Outcome: Progressing  Goal: Achieves maximal functionality and self care  Description  INTERVENTIONS  - Monitor swallowing and airway patency with patient fatigue and changes in neurological status  - Encourage and assist patient to increase activity and self care with guidance from rehab services  - Encourage visually impaired, hearing impaired and aphasic patients to use assistive/communication devices  Outcome: Progressing     Problem: MUSCULOSKELETAL - ADULT  Goal: Maintain or return mobility to safest level of function  Description  INTERVENTIONS:  - Assess patient's ability to carry out ADLs; assess patient's baseline for ADL function and identify physical deficits which impact ability to perform ADLs (bathing, care of mouth/teeth, toileting, grooming, dressing, etc )  - Assess/evaluate cause of self-care deficits   - Assess range of motion  - Assess patient's mobility; develop plan if impaired  - Assess patient's need for assistive devices and provide as appropriate  - Encourage maximum independence but intervene and supervise when necessary  - Involve family in performance of ADLs  - Assess for home care needs following discharge   - Request OT consult to assist with ADL evaluation and planning for discharge  - Provide patient education as appropriate  Outcome: Progressing  Goal: Maintain proper alignment of affected body part  Description  INTERVENTIONS:  - Support, maintain and protect limb and body alignment  - Provide pt/fam with appropriate education  Outcome: Progressing     Problem: CARDIOVASCULAR - ADULT  Goal: Maintains optimal cardiac output and hemodynamic stability  Description  INTERVENTIONS:  - Monitor I/O, vital signs and rhythm  - Monitor for S/S and trends of decreased cardiac output i e  bleeding, hypotension  - Administer and titrate ordered vasoactive medications to optimize hemodynamic stability  - Assess quality of pulses, skin color and temperature  - Assess for signs of decreased coronary artery perfusion - ex  Angina  - Instruct patient to report change in severity of symptoms  Outcome: Progressing     Problem: Potential for Falls  Goal: Patient will remain free of falls  Description  INTERVENTIONS:  - Assess patient frequently for physical needs  -  Identify cognitive and physical deficits and behaviors that affect risk of falls    -  [place] fall precautions as indicated by assessment   - Educate patient/family on patient safety including physical limitations  - Instruct patient to call for assistance with activity based on assessment  - Modify environment to reduce risk of injury  - Consider OT/PT consult to assist with strengthening/mobility  Outcome: Progressing     Problem: Neurological Deficit  Goal: Neurological status is stable or improving  Description  Interventions:  - Monitor and assess patient's level of consciousness, motor function, sensory function, and level of assistance needed for ADLs  - Monitor and report changes from baseline  Collaborate with interdisciplinary team to initiate plan and implement interventions as ordered  - Provide and maintain a safe environment  - Utilize seizure precautions  - Utilize fall precautions  - Utilize aspiration precautions  - Utilize bleeding precautions  Outcome: Progressing     Problem: Activity Intolerance/Impaired Mobility  Goal: Mobility/activity is maintained at optimum level for patient  Description  Interventions:  - Assess and monitor patient  barriers to mobility and need for assistive/adaptive devices  - Assess patient's emotional response to limitations  - Collaborate with interdisciplinary team and initiate plans and interventions as ordered  - Encourage independent activity per ability   - Maintain proper body alignment  - Perform active/passive rom as tolerated/ordered  - Plan activities to conserve energy   - Turn patient  Outcome: Progressing     Problem: Communication Impairment  Goal: Ability to express needs and understand communication  Description  Assess patient's communication skills and ability to understand information  Patient will demonstrate use of effective communication techniques, alternative methods of communication and understanding even if not able to speak  - Encourage communication and provide alternate methods of communication as needed  - Collaborate with case management/ for discharge needs  - Include patient/family/caregiver in decisions related to communication    Outcome: Progressing     Problem: Potential for Aspiration  Goal: Non-ventilated patient's risk of aspiration is minimized  Description  Assess and monitor vital signs, respiratory status, and labs (WBC)  Monitor for signs of aspiration (tachypnea, cough, rales, wheezing, cyanosis, fever)  - Assess and monitor patient's ability to swallow  - Place patient up in chair to eat if possible  - HOB up at 90 degrees to eat if unable to get patient up into chair   - Supervise patient during oral intake  - Instruct patient to take small bites  - Instruct patient to take small single sips when taking liquids  - Follow patient-specific strategies generated by speech pathologist   Outcome: Progressing     Problem: Nutrition/Hydration-ADULT  Goal: Nutrient/Hydration intake appropriate for improving, restoring or maintaining nutritional needs  Description  Monitor and assess patient's nutrition/hydration status for malnutrition (ex- brittle hair, bruises, dry skin, pale skin and conjunctiva, muscle wasting, smooth red tongue, and disorientation)  Collaborate with interdisciplinary team and initiate plan and interventions as ordered  Monitor patient's weight and dietary intake as ordered or per policy  Utilize nutrition screening tool and intervene per policy  Determine patient's food preferences and provide high-protein, high-caloric foods as appropriate       INTERVENTIONS:  - Monitor oral intake, urinary output, labs, and treatment plans  - Assess nutrition and hydration status and recommend course of action  - Evaluate amount of meals eaten  - Assist patient with eating if necessary   - Allow adequate time for meals  - Recommend/ encourage appropriate diets, oral nutritional supplements, and vitamin/mineral supplements  - Order, calculate, and assess calorie counts as needed  - Recommend, monitor, and adjust tube feedings and TPN/PPN based on assessed needs  - Assess need for intravenous fluids  - Provide specific nutrition/hydration education as appropriate  - Include patient/family/caregiver in decisions related to nutrition  Outcome: Progressing

## 2019-08-04 NOTE — PLAN OF CARE
Problem: Prexisting or High Potential for Compromised Skin Integrity  Goal: Skin integrity is maintained or improved  Description  INTERVENTIONS:  - Identify patients at risk for skin breakdown  - Assess and monitor skin integrity  - Assess and monitor nutrition and hydration status  - Monitor labs (i e  albumin)  - Assess for incontinence   - Turn and reposition patient  - Assist with mobility/ambulation  - Relieve pressure over bony prominences  - Avoid friction and shearing  - Provide appropriate hygiene as needed including keeping skin clean and dry  - Evaluate need for skin moisturizer/barrier cream  - Collaborate with interdisciplinary team (i e  Nutrition, Rehabilitation, etc )   - Patient/family teaching  Outcome: Progressing     Problem: NEUROSENSORY - ADULT  Goal: Achieves stable or improved neurological status  Description  INTERVENTIONS  - Monitor and report changes in neurological status  - Initiate measures to prevent increased intracranial pressure  - Maintain blood pressure and fluid volume within ordered parameters to optimize cerebral perfusion  - Monitor temperature, glucose, and sodium or any other associated labs   Initiate appropriate interventions as ordered  - Monitor for seizure activity   - Administer anti-seizure medications as ordered  Outcome: Progressing  Goal: Achieves maximal functionality and self care  Description  INTERVENTIONS  - Monitor swallowing and airway patency with patient fatigue and changes in neurological status  - Encourage and assist patient to increase activity and self care with guidance from rehab services  - Encourage visually impaired, hearing impaired and aphasic patients to use assistive/communication devices  Outcome: Progressing     Problem: MUSCULOSKELETAL - ADULT  Goal: Maintain or return mobility to safest level of function  Description  INTERVENTIONS:  - Assess patient's ability to carry out ADLs; assess patient's baseline for ADL function and identify physical deficits which impact ability to perform ADLs (bathing, care of mouth/teeth, toileting, grooming, dressing, etc )  - Assess/evaluate cause of self-care deficits   - Assess range of motion  - Assess patient's mobility; develop plan if impaired  - Assess patient's need for assistive devices and provide as appropriate  - Encourage maximum independence but intervene and supervise when necessary  - Involve family in performance of ADLs  - Assess for home care needs following discharge   - Request OT consult to assist with ADL evaluation and planning for discharge  - Provide patient education as appropriate  Outcome: Progressing  Goal: Maintain proper alignment of affected body part  Description  INTERVENTIONS:  - Support, maintain and protect limb and body alignment  - Provide pt/fam with appropriate education  Outcome: Progressing     Problem: CARDIOVASCULAR - ADULT  Goal: Maintains optimal cardiac output and hemodynamic stability  Description  INTERVENTIONS:  - Monitor I/O, vital signs and rhythm  - Monitor for S/S and trends of decreased cardiac output i e  bleeding, hypotension  - Administer and titrate ordered vasoactive medications to optimize hemodynamic stability  - Assess quality of pulses, skin color and temperature  - Assess for signs of decreased coronary artery perfusion - ex  Angina  - Instruct patient to report change in severity of symptoms  Outcome: Progressing     Problem: Potential for Falls  Goal: Patient will remain free of falls  Description  INTERVENTIONS:  - Assess patient frequently for physical needs  -  Identify cognitive and physical deficits and behaviors that affect risk of falls    -  Vandiver fall precautions as indicated by assessment   - Educate patient/family on patient safety including physical limitations  - Instruct patient to call for assistance with activity based on assessment  - Modify environment to reduce risk of injury  - Consider OT/PT consult to assist with strengthening/mobility  Outcome: Progressing     Problem: Neurological Deficit  Goal: Neurological status is stable or improving  Description  Interventions:  - Monitor and assess patient's level of consciousness, motor function, sensory function, and level of assistance needed for ADLs  - Monitor and report changes from baseline  Collaborate with interdisciplinary team to initiate plan and implement interventions as ordered  - Provide and maintain a safe environment  - Utilize seizure precautions  - Utilize fall precautions  - Utilize aspiration precautions  - Utilize bleeding precautions  Outcome: Progressing     Problem: Activity Intolerance/Impaired Mobility  Goal: Mobility/activity is maintained at optimum level for patient  Description  Interventions:  - Assess and monitor patient  barriers to mobility and need for assistive/adaptive devices  - Assess patient's emotional response to limitations  - Collaborate with interdisciplinary team and initiate plans and interventions as ordered  - Encourage independent activity per ability   - Maintain proper body alignment  - Perform active/passive rom as tolerated/ordered  - Plan activities to conserve energy   - Turn patient  Outcome: Progressing     Problem: Communication Impairment  Goal: Ability to express needs and understand communication  Description  Assess patient's communication skills and ability to understand information  Patient will demonstrate use of effective communication techniques, alternative methods of communication and understanding even if not able to speak  - Encourage communication and provide alternate methods of communication as needed  - Collaborate with case management/ for discharge needs  - Include patient/family/caregiver in decisions related to communication    Outcome: Progressing     Problem: Potential for Aspiration  Goal: Non-ventilated patient's risk of aspiration is minimized  Description  Assess and monitor vital signs, respiratory status, and labs (WBC)  Monitor for signs of aspiration (tachypnea, cough, rales, wheezing, cyanosis, fever)  - Assess and monitor patient's ability to swallow  - Place patient up in chair to eat if possible  - HOB up at 90 degrees to eat if unable to get patient up into chair   - Supervise patient during oral intake  - Instruct patient to take small bites  - Instruct patient to take small single sips when taking liquids  - Follow patient-specific strategies generated by speech pathologist   Outcome: Progressing     Problem: Nutrition/Hydration-ADULT  Goal: Nutrient/Hydration intake appropriate for improving, restoring or maintaining nutritional needs  Description  Monitor and assess patient's nutrition/hydration status for malnutrition (ex- brittle hair, bruises, dry skin, pale skin and conjunctiva, muscle wasting, smooth red tongue, and disorientation)  Collaborate with interdisciplinary team and initiate plan and interventions as ordered  Monitor patient's weight and dietary intake as ordered or per policy  Utilize nutrition screening tool and intervene per policy  Determine patient's food preferences and provide high-protein, high-caloric foods as appropriate       INTERVENTIONS:  - Monitor oral intake, urinary output, labs, and treatment plans  - Assess nutrition and hydration status and recommend course of action  - Evaluate amount of meals eaten  - Assist patient with eating if necessary   - Allow adequate time for meals  - Recommend/ encourage appropriate diets, oral nutritional supplements, and vitamin/mineral supplements  - Order, calculate, and assess calorie counts as needed  - Recommend, monitor, and adjust tube feedings and TPN/PPN based on assessed needs  - Assess need for intravenous fluids  - Provide specific nutrition/hydration education as appropriate  - Include patient/family/caregiver in decisions related to nutrition  Outcome: Progressing

## 2019-08-04 NOTE — UTILIZATION REVIEW
Continued Stay Review  Date: 8/4/19                       Current Patient Class: INPATIENT  Current Level of Care: MED SURG  Assessment/Plan:   S/P CVA, BP REMAINS ELEVATED, HYDRALAZINE DOSE INCREASED PER CARDIO FOR BETTER CONTROL  TEMP SPIKE  3, C/O NAUSEA  PT WITHDRAWN, DEPRESSED, +PRODUCTIVE COUGH, LUNGS WITH RALES NOTED, ABD DISTENDED  STAT LABS SHOWING LEUKOCYTOSIS, RISE IN CREATININE, CXR & KUB DONE, SHOWING OBSTRUCTION VS ILEUS  PT MADE NPO & STARTED ON EMPIRIC IVABT PENDING URINE & BLOOD CULTURES  IV HYDRATION STARTED, DEMADEX ON HOLD  Pertinent Labs/Diagnostic Results:    Ref Range & Units 8/4/19 2215   WBC 4 31 - 10 16 Thousand/uL 16  53High     RBC 3 88 - 5 62 Million/uL 5 05    Hemoglobin 12 0 - 17 0 g/dL 15 8    Hematocrit 36 5 - 49 3 % 46 8    MCV 82 - 98 fL 93    MCH 26 8 - 34 3 pg 31 3    MCHC 31 4 - 37 4 g/dL 33 8    RDW 11 6 - 15 1 % 14 5    Platelets 146 - 799 Thousands/uL 198       Ref Range & Units 8/4/19 2215   Sodium 136 - 145 mmol/L 137    Potassium 3 5 - 5 3 mmol/L 3 8    Chloride 100 - 108 mmol/L 102    CO2 21 - 32 mmol/L 23    ANION GAP 4 - 13 mmol/L 12    BUN 5 - 25 mg/dL 21    Creatinine 0 60 - 1 30 mg/dL 1  74High     Glucose 65 - 140 mg/dL 125    Calcium 8 3 - 10 1 mg/dL 9 1    eGFR ml/min/1 73sq m 48      Results from last 7 days   Lab Units 08/04/19 0527 08/03/19 0450 08/02/19  0514 08/01/19  0530  07/31/19  1324   WBC Thousand/uL 6 19 6 51 8 35 8 96  --  7 16   HEMOGLOBIN g/dL 15 9 15 6 17 2* 16 3  --  16 4   HEMATOCRIT % 48 5 47 0 52 1* 48 6  --  50 2*   PLATELETS Thousands/uL 226 217 241 229  --  221   NEUTROS ABS Thousands/µL 3 15 3 96 4 06 5 35   < >  --     < > = values in this interval not displayed       Results from last 7 days   Lab Units 08/04/19  0527 08/03/19  0450 08/02/19  0514 08/01/19  0530 07/31/19  1324   SODIUM mmol/L 139 138 139 140 140   POTASSIUM mmol/L 3 7 3 4* 4 0 3 7 3 7   CHLORIDE mmol/L 106 105 103 103 103   CO2 mmol/L 23 22 29 26 26   ANION GAP mmol/L 10 11 7 11 11   BUN mg/dL 15 16 18 13 13   CREATININE mg/dL 1 45* 1 44* 1 53* 1 43* 1 41*   EGFR ml/min/1 73sq m 59 60 56 60 61   CALCIUM mg/dL 8 6 8 5 8 9 9 0 8 9   MAGNESIUM mg/dL  --   --   --  2 1  --    PHOSPHORUS mg/dL  --   --   --  3 5  --      Results from last 7 days   Lab Units 08/04/19  0527 08/03/19  0450 08/02/19  0514 08/01/19  0530 07/31/19  1324   GLUCOSE RANDOM mg/dL 99 99 98 96 96   CXR=No acute cardiopulmonary disease is seen  KUB=Multiple mildly dilated loops of small bowel in the mid and lower abdomen, consider small bowel ileus versus obstruction depending on the clinical setting  Consider CT for further evaluation at the discretion of the referring caregiver  No discrete evidence of intraperitoneal free air  Vital Signs:   08/04/19 2314  100 9 °F (38 3 °C)Abnormal                08/04/19 2224  101 3 °F (38 5 °C)Abnormal   89  20  109/58      Lying   08/04/19 2007  100 6 °F (38 1 °C)Abnormal   89  18  134/73  96 %  None (Room air)  Lying   08/04/19 1616  100 3 °F (37 9 °C)  78  18    95 %         Medications:   Scheduled Meds:  Current Facility-Administered Medications:  acetaminophen 650 mg Oral Q6H PRN   aluminum-magnesium hydroxide-simethicone 30 mL Oral Q4H PRN   AMILoride 10 mg Oral Daily   amLODIPine 10 mg Oral Daily   aspirin 81 mg Oral Daily   atorvastatin 80 mg Oral Daily With Dinner   cefepime 2,000 mg Intravenous Q12H   clopidogrel 75 mg Oral Daily   docusate sodium 100 mg Oral BID   doxazosin 2 mg Oral HS   enoxaparin 40 mg Subcutaneous Daily   hydrALAZINE 10 mg Intravenous Q4H PRN   hydrALAZINE 50 mg Oral Q8H POP   losartan 100 mg Oral Daily   metroNIDAZOLE 500 mg Intravenous Q8H   nebivolol 20 mg Oral HS   ondansetron 4 mg Intravenous Q6H PRN   pantoprazole 40 mg Oral Early Morning   sodium chloride 75 mL/hr Intravenous Continuous     Discharge Plan: TBD  Network Utilization Review Department  Phone: [phone];  Fax [phone]  [email or web address]  ATTENTION: Please call with any questions or concerns to [phone]  and carefully listen to the prompts so that you are directed to the right person  Send all requests for admission clinical reviews, approved or denied determinations and any other requests to fax [phone]   All voicemails are confidential

## 2019-08-05 ENCOUNTER — APPOINTMENT (INPATIENT)
Dept: RADIOLOGY | Facility: HOSPITAL | Age: 64
DRG: 064 | End: 2019-08-05
Payer: COMMERCIAL

## 2019-08-05 PROBLEM — A41.9 SEPSIS (HCC): Status: ACTIVE | Noted: 2019-08-05

## 2019-08-05 LAB
ALBUMIN SERPL BCP-MCNC: 3 G/DL (ref 3.5–5)
ALP SERPL-CCNC: 56 U/L (ref 46–116)
ALT SERPL W P-5'-P-CCNC: 35 U/L (ref 12–78)
ANION GAP SERPL CALCULATED.3IONS-SCNC: 10 MMOL/L (ref 4–13)
AST SERPL W P-5'-P-CCNC: 23 U/L (ref 5–45)
BACTERIA UR QL AUTO: ABNORMAL /HPF
BASOPHILS # BLD AUTO: 0.07 THOUSANDS/ΜL (ref 0–0.1)
BASOPHILS NFR BLD AUTO: 1 % (ref 0–1)
BILIRUB SERPL-MCNC: 1.6 MG/DL (ref 0.2–1)
BILIRUB UR QL STRIP: NEGATIVE
BUN SERPL-MCNC: 23 MG/DL (ref 5–25)
CALCIUM SERPL-MCNC: 8.4 MG/DL (ref 8.3–10.1)
CHEST PAIN STATEMENT: NORMAL
CHLORIDE SERPL-SCNC: 104 MMOL/L (ref 100–108)
CLARITY UR: CLEAR
CO2 SERPL-SCNC: 23 MMOL/L (ref 21–32)
COLOR UR: ABNORMAL
CREAT SERPL-MCNC: 1.78 MG/DL (ref 0.6–1.3)
CREAT UR-MCNC: 178 MG/DL
EOSINOPHIL # BLD AUTO: 0.01 THOUSAND/ΜL (ref 0–0.61)
EOSINOPHIL NFR BLD AUTO: 0 % (ref 0–6)
ERYTHROCYTE [DISTWIDTH] IN BLOOD BY AUTOMATED COUNT: 14.2 % (ref 11.6–15.1)
GFR SERPL CREATININE-BSD FRML MDRD: 46 ML/MIN/1.73SQ M
GLUCOSE SERPL-MCNC: 95 MG/DL (ref 65–140)
GLUCOSE UR STRIP-MCNC: NEGATIVE MG/DL
HCT VFR BLD AUTO: 44.7 % (ref 36.5–49.3)
HGB BLD-MCNC: 14.6 G/DL (ref 12–17)
HGB UR QL STRIP.AUTO: NEGATIVE
IMM GRANULOCYTES # BLD AUTO: 0.08 THOUSAND/UL (ref 0–0.2)
IMM GRANULOCYTES NFR BLD AUTO: 1 % (ref 0–2)
KETONES UR STRIP-MCNC: NEGATIVE MG/DL
LEUKOCYTE ESTERASE UR QL STRIP: ABNORMAL
LYMPHOCYTES # BLD AUTO: 1.33 THOUSANDS/ΜL (ref 0.6–4.47)
LYMPHOCYTES NFR BLD AUTO: 10 % (ref 14–44)
MAGNESIUM SERPL-MCNC: 2.3 MG/DL (ref 1.6–2.6)
MAX DIASTOLIC BP: 108 MMHG
MAX HEART RATE: 100 BPM
MAX PREDICTED HEART RATE: 158 BPM
MAX. SYSTOLIC BP: 190 MMHG
MCH RBC QN AUTO: 31.2 PG (ref 26.8–34.3)
MCHC RBC AUTO-ENTMCNC: 32.7 G/DL (ref 31.4–37.4)
MCV RBC AUTO: 96 FL (ref 82–98)
MONOCYTES # BLD AUTO: 0.79 THOUSAND/ΜL (ref 0.17–1.22)
MONOCYTES NFR BLD AUTO: 6 % (ref 4–12)
NEUTROPHILS # BLD AUTO: 10.9 THOUSANDS/ΜL (ref 1.85–7.62)
NEUTS SEG NFR BLD AUTO: 82 % (ref 43–75)
NITRITE UR QL STRIP: POSITIVE
NON-SQ EPI CELLS URNS QL MICRO: ABNORMAL /HPF
NRBC BLD AUTO-RTO: 0 /100 WBCS
PH UR STRIP.AUTO: 5.5 [PH]
PLATELET # BLD AUTO: 187 THOUSANDS/UL (ref 149–390)
PMV BLD AUTO: 12.3 FL (ref 8.9–12.7)
POTASSIUM SERPL-SCNC: 3.9 MMOL/L (ref 3.5–5.3)
PROCALCITONIN SERPL-MCNC: 6.05 NG/ML
PROT SERPL-MCNC: 6.5 G/DL (ref 6.4–8.2)
PROT UR STRIP-MCNC: ABNORMAL MG/DL
PROT UR-MCNC: 57 MG/DL
PROT/CREAT UR: 0.32 MG/G{CREAT} (ref 0–0.1)
PROTOCOL NAME: NORMAL
RBC # BLD AUTO: 4.68 MILLION/UL (ref 3.88–5.62)
RBC #/AREA URNS AUTO: ABNORMAL /HPF
REASON FOR TERMINATION: NORMAL
SODIUM SERPL-SCNC: 137 MMOL/L (ref 136–145)
SP GR UR STRIP.AUTO: 1.01 (ref 1–1.03)
TARGET HR FORMULA: NORMAL
TEST INDICATION: NORMAL
TIME IN EXERCISE PHASE: NORMAL
UROBILINOGEN UR QL STRIP.AUTO: 0.2 E.U./DL
WBC # BLD AUTO: 13.18 THOUSAND/UL (ref 4.31–10.16)
WBC #/AREA URNS AUTO: ABNORMAL /HPF
WBC CLUMPS # UR AUTO: PRESENT /UL

## 2019-08-05 PROCEDURE — 84156 ASSAY OF PROTEIN URINE: CPT | Performed by: INTERNAL MEDICINE

## 2019-08-05 PROCEDURE — 82570 ASSAY OF URINE CREATININE: CPT | Performed by: INTERNAL MEDICINE

## 2019-08-05 PROCEDURE — 87086 URINE CULTURE/COLONY COUNT: CPT | Performed by: INTERNAL MEDICINE

## 2019-08-05 PROCEDURE — 76770 US EXAM ABDO BACK WALL COMP: CPT

## 2019-08-05 PROCEDURE — 83735 ASSAY OF MAGNESIUM: CPT | Performed by: INTERNAL MEDICINE

## 2019-08-05 PROCEDURE — 97110 THERAPEUTIC EXERCISES: CPT

## 2019-08-05 PROCEDURE — 80053 COMPREHEN METABOLIC PANEL: CPT | Performed by: INTERNAL MEDICINE

## 2019-08-05 PROCEDURE — 94760 N-INVAS EAR/PLS OXIMETRY 1: CPT

## 2019-08-05 PROCEDURE — 87147 CULTURE TYPE IMMUNOLOGIC: CPT | Performed by: INTERNAL MEDICINE

## 2019-08-05 PROCEDURE — 85025 COMPLETE CBC W/AUTO DIFF WBC: CPT | Performed by: INTERNAL MEDICINE

## 2019-08-05 PROCEDURE — 97116 GAIT TRAINING THERAPY: CPT

## 2019-08-05 PROCEDURE — 74176 CT ABD & PELVIS W/O CONTRAST: CPT

## 2019-08-05 PROCEDURE — 84145 PROCALCITONIN (PCT): CPT | Performed by: INTERNAL MEDICINE

## 2019-08-05 PROCEDURE — 99232 SBSQ HOSP IP/OBS MODERATE 35: CPT | Performed by: INTERNAL MEDICINE

## 2019-08-05 PROCEDURE — 99233 SBSQ HOSP IP/OBS HIGH 50: CPT | Performed by: INTERNAL MEDICINE

## 2019-08-05 PROCEDURE — 81001 URINALYSIS AUTO W/SCOPE: CPT | Performed by: NURSE PRACTITIONER

## 2019-08-05 PROCEDURE — 92610 EVALUATE SWALLOWING FUNCTION: CPT

## 2019-08-05 RX ORDER — SODIUM CHLORIDE 9 MG/ML
75 INJECTION, SOLUTION INTRAVENOUS CONTINUOUS
Status: DISCONTINUED | OUTPATIENT
Start: 2019-08-05 | End: 2019-08-05

## 2019-08-05 RX ORDER — CEFTRIAXONE 1 G/50ML
1000 INJECTION, SOLUTION INTRAVENOUS EVERY 24 HOURS
Status: DISCONTINUED | OUTPATIENT
Start: 2019-08-05 | End: 2019-08-06 | Stop reason: HOSPADM

## 2019-08-05 RX ORDER — AMILORIDE HYDROCHLORIDE 5 MG/1
5 TABLET ORAL DAILY
Status: DISCONTINUED | OUTPATIENT
Start: 2019-08-06 | End: 2019-08-06 | Stop reason: HOSPADM

## 2019-08-05 RX ADMIN — CLOPIDOGREL BISULFATE 75 MG: 75 TABLET ORAL at 09:08

## 2019-08-05 RX ADMIN — SODIUM CHLORIDE 75 ML/HR: 0.9 INJECTION, SOLUTION INTRAVENOUS at 02:11

## 2019-08-05 RX ADMIN — ASPIRIN 81 MG: 81 TABLET, COATED ORAL at 09:08

## 2019-08-05 RX ADMIN — ACETAMINOPHEN 650 MG: 325 TABLET, FILM COATED ORAL at 23:59

## 2019-08-05 RX ADMIN — Medication 2000 MG: at 10:43

## 2019-08-05 RX ADMIN — ATORVASTATIN CALCIUM 80 MG: 80 TABLET ORAL at 16:39

## 2019-08-05 RX ADMIN — AMLODIPINE BESYLATE 10 MG: 10 TABLET ORAL at 09:08

## 2019-08-05 RX ADMIN — HYDRALAZINE HYDROCHLORIDE 50 MG: 25 TABLET ORAL at 14:40

## 2019-08-05 RX ADMIN — METRONIDAZOLE 500 MG: 500 INJECTION, SOLUTION INTRAVENOUS at 06:29

## 2019-08-05 RX ADMIN — ENOXAPARIN SODIUM 40 MG: 40 INJECTION SUBCUTANEOUS at 09:08

## 2019-08-05 RX ADMIN — NEBIVOLOL HYDROCHLORIDE 20 MG: 10 TABLET ORAL at 21:42

## 2019-08-05 RX ADMIN — CEFTRIAXONE 1000 MG: 1 INJECTION, SOLUTION INTRAVENOUS at 21:38

## 2019-08-05 RX ADMIN — PANTOPRAZOLE SODIUM 40 MG: 40 TABLET, DELAYED RELEASE ORAL at 06:27

## 2019-08-05 RX ADMIN — AMILORIDE HYDROCLORIDE 10 MG: 5 TABLET ORAL at 09:08

## 2019-08-05 RX ADMIN — LOSARTAN POTASSIUM 100 MG: 50 TABLET ORAL at 09:08

## 2019-08-05 RX ADMIN — DOCUSATE SODIUM 100 MG: 100 CAPSULE, LIQUID FILLED ORAL at 09:08

## 2019-08-05 RX ADMIN — HYDRALAZINE HYDROCHLORIDE 50 MG: 25 TABLET ORAL at 21:40

## 2019-08-05 NOTE — UTILIZATION REVIEW
Continued Stay Review  Date: 8/5/19                       Current Patient Class: INPATIENT  Current Level of Care: TELEMETRY  Assessment/Plan:   TMAX 101 3, TEMP THIS EVENING  3  DOUBLE IVABT THERAPY CONTINUES FOR SEPSIS, ETIOLOGY UNKNOWN, U/A SUGGESTING UTI, CULTURES PENDING  SCHEDULED FOR US & CT  PERSISTENT URINARY RETENTION, UROLOGY CONSULTED     Pertinent Labs/Diagnostic Results:   Results from last 7 days   Lab Units 08/05/19 0457 08/04/19 2215 08/04/19 0527 08/03/19 0450 08/02/19  0514   WBC Thousand/uL 13 18* 16 53* 6 19 6 51 8 35   HEMOGLOBIN g/dL 14 6 15 8 15 9 15 6 17 2*   HEMATOCRIT % 44 7 46 8 48 5 47 0 52 1*   PLATELETS Thousands/uL 187 198 226 217 241   NEUTROS ABS Thousands/µL 10 90*  --  3 15 3 96 4 06     Results from last 7 days   Lab Units 08/05/19 0457 08/04/19 2215 08/04/19 0527 08/03/19  0450 08/02/19  0514 08/01/19  0530   SODIUM mmol/L 137 137 139 138 139 140   POTASSIUM mmol/L 3 9 3 8 3 7 3 4* 4 0 3 7   CHLORIDE mmol/L 104 102 106 105 103 103   CO2 mmol/L 23 23 23 22 29 26   ANION GAP mmol/L 10 12 10 11 7 11   BUN mg/dL 23 21 15 16 18 13   CREATININE mg/dL 1 78* 1 74* 1 45* 1 44* 1 53* 1 43*   EGFR ml/min/1 73sq m 46 48 59 60 56 60   CALCIUM mg/dL 8 4 9 1 8 6 8 5 8 9 9 0   MAGNESIUM mg/dL 2 3  --   --   --   --  2 1   PHOSPHORUS mg/dL  --   --   --   --   --  3 5     Results from last 7 days   Lab Units 08/05/19 0457 07/31/19  1324   AST U/L 23 29   ALT U/L 35 50   ALK PHOS U/L 56 79   TOTAL PROTEIN g/dL 6 5 7 8   ALBUMIN g/dL 3 0* 3 9   TOTAL BILIRUBIN mg/dL 1 60* 0 80   BILIRUBIN DIRECT mg/dL  --  0 20     Results from last 7 days   Lab Units 08/05/19 0457 08/04/19 2215 08/04/19 0527 08/03/19  0450 08/02/19  0514 08/01/19  0530 07/31/19  1324   GLUCOSE RANDOM mg/dL 95 125 99 99 98 96 96     Results from last 7 days   Lab Units 08/05/19  0622   PROCALCITONIN ng/ml 6 05*     Results from last 7 days   Lab Units 08/04/19  2215   LACTIC ACID mmol/L 1 3     Results from last 7 days   Lab Units 08/05/19  0625 08/02/19  1611   CLARITY UA  Clear Clear   COLOR UA  Marla Light Yellow   SPEC GRAV UA  1 015 1 015   PH UA  5 5 6 0   GLUCOSE UA mg/dl Negative Negative   KETONES UA mg/dl Negative Negative   BLOOD UA  Negative Negative   PROTEIN UA mg/dl 30 (1+)* 100 (2+)*   NITRITE UA  Positive* Negative   BILIRUBIN UA  Negative Negative   UROBILINOGEN UA E U /dl 0 2 1 0   LEUKOCYTES UA  Trace* Negative   WBC UA /hpf 4-10* 0-1*   RBC UA /hpf None Seen 0-1*   BACTERIA UA /hpf Moderate* Occasional   EPITHELIAL CELLS WET PREP /hpf None Seen Occasional   CREATININE UR mg/dL 178 0  --    PROTEIN UR mg/dL 57  --    PROT/CREAT RATIO UR  0 32*  --    US KIDNEYS & BLADDER=No hydronephrosis  Underdistended bladder with diffusely thickened wall likely due to underdistention or cystitis  Hypoechoic lesion with minimally thickened internal echogenic septations or calcifications in the left kidney  The lesion meets criteria for Bosniak 2F  Follow-up with renal ultrasound in 6 months is recommended  CT A/P=1  No acute findings  No evidence of bowel obstruction or ileus  2   Left ventral subcutaneous soft tissue emphysema likely reflects injection or other intervention in this region      Vital Signs: /68 (BP Location: Left arm)   Pulse 67   Temp 98 3 °F (36 8 °C) (Oral)   Resp 18   Ht 6' 1" (1 854 m)   Wt 94 9 kg (209 lb 3 5 oz)   SpO2 97%   BMI 27 60 kg/m²    08/05/19 2350  100 3 °F (37 9 °C)  70  20  144/69  95 %  None (Room air)  Lying     Medications:   Scheduled Meds:   Current Facility-Administered Medications:  acetaminophen 650 mg Oral Q6H PRN   aluminum-magnesium hydroxide-simethicone 30 mL Oral Q4H PRN   AMILoride 10 mg Oral Daily   amLODIPine 10 mg Oral Daily   aspirin 81 mg Oral Daily   atorvastatin 80 mg Oral Daily With Dinner   cefepime 2,000 mg Intravenous Q12H   clopidogrel 75 mg Oral Daily   docusate sodium 100 mg Oral BID   doxazosin 2 mg Oral HS   enoxaparin 40 mg Subcutaneous Daily   hydrALAZINE 10 mg Intravenous Q4H PRN   hydrALAZINE 50 mg Oral Q8H [address]   losartan 100 mg Oral Daily   metroNIDAZOLE 500 mg Intravenous Q8H   nebivolol 20 mg Oral HS   ondansetron 4 mg Intravenous Q6H PRN   pantoprazole 40 mg Oral Early Morning   sodium chloride 75 mL/hr Intravenous Continuous     Discharge Plan: TBD  Network Utilization Review Department  Phone: [phone]; Fax [phone]  [email or web address]  ATTENTION: Please call with any questions or concerns to [phone]  and carefully listen to the prompts so that you are directed to the right person  Send all requests for admission clinical reviews, approved or denied determinations and any other requests to fax [phone]   All voicemails are confidential

## 2019-08-05 NOTE — PROGRESS NOTES
[address] Internal Medicine Progress Note  Patient: Susan Gracia 60 y o  male   MRN: [id number]  PCP: Ella Barton MD  Unit/Bed#: [address] [place] [address] Encounter: [id number]  Date Of Visit: 08/05/19    Problem List:    Principal Problem:    Lacunar infarct, acute ([facility] )  Active Problems:    Hypertensive emergency    Type 2 MI    Stage 3 chronic kidney disease ([facility] )    Sepsis ([facility] )    Urinary retention    HLD (hyperlipidemia)    Abnormal MRI    Abnormal finding on CT scan      Assessment & Plan:    * Lacunar infarct, acute [facility])  Assessment & Plan  Patient presented with worsening right-sided weakness to ED, symptoms actually started evening earlier but patient did not present at that time  CT head and CTA of the head and neck without any acute abnormality or large vessel occlusion  NIHSS on presentation at least 5  Not a candidate for tPA due to out of window  Initially was admitted to ICU due to hypertensive emergency requiring Cardene drip  Patient reports history of long-term uncontrolled hypertension, likely etiology    Resistant hypertension concerning for secondary component  · MRI of the brain- Acute appearing lacunar infarct at the posterior left corona radiata  · 2D echo-EF 81-10%, grade 1 diastolic dysfunction, negative bubble study, PA pressure 35  · LDL -130  · Hemoglobin A1c is 5 8  · Neurology input appreciated  · Dual antiplatelet aspirin and Plavix for at least 90 days, atorvastatin 80 mg daily  · Continue PT/OT/ST  · Require further optimization of the blood pressure to prevent future similar complications  · Neurology following    Sepsis [facility])  Assessment & Plan  Noted to have fever of 101 3 degree F with associated leukocytosis  Possible source includes urinary tract infection  Lactic acid within normal limit  Blood cultures are pending  Chest x-ray without any acute abnormality  UA is suggestive for UTI, follow-up urine culture  X-ray of the abdomen with possible ileus, check noncontrast CT scan  Monitor LFT  Continue cefepime and Flagyl for now but will de-escalated to ceftriaxone if CT scan is unremarkable  Continue IV fluids    Stage 3 chronic kidney disease ([facility] )  Assessment & Plan  Records reviewed from PMD is office, creatinine 1 38   Creatinine 1 41 on presentation, 1  7today setting of infection  Status post CTA with contrast  Denies any urinary complaints but with intermittent urinary retention in setting of CVA  UA noted with proteinuria , follow-up protein creatinine ratio   Monitor PVR, encourage voiding  Follow-up ultrasound  Monitor intake output  Hold nephrotoxins  Nephrology following    Type 2 MI  Assessment & Plan  Presented with elevated troponin in setting of acute CVA  EKG normal sinus rhythm, LVH strain pattern  Troponin 0 32-0 30  Cardiology input noted  Status post nuclear stress test on 08/03-no evidence of ischemia  Continue medical management  Cardiology following    Hypertensive emergency  Assessment & Plan  In setting of acute CVA with progressive symptoms  Initially admitted to ICU , continued on Cardene drip  Reports history of uncontrolled hypertension  Status post Cardene drip  · Resumed on Bystolic, losartan, hydralazine, amlodipine  · Doxazosin was added  · Renal duplex without any evidence of renal artery stenosis  · Follow-up plasma metanephrines  · Also requires renin and aldosterone level but will need it in 6 weeks as patient was on spironolactone prior to admission  · Still with episodic significantly elevated blood pressure  · Cardiology following  · Nephrology evaluation appreciated for secondary hypertension  Added amiloride and torsemide  · Holding torsemide today due to infection  · Continue other antihypertensive with holding parameters  · Underwent sleep study with elevated AHI but unclear significance as patient's sleep was interrupted yesterday    Will need formal sleep study as outpatient    Urinary retention  Assessment & Plan  Likely secondary to CVA and constipation  Reported good bowel movement on 08/03  Urinary retention protocol, able to void when encouraged  Continue to monitor PVR  Bowel regimen  Follow-up  CT scan  On Doxazosin as above  Consider urology evaluation    Abnormal finding on CT scan  Assessment & Plan  Frothy soft tissue abnormality in the nondependent portion of the trachea with imaging features favoring aspiration     Differential diagnosis could include tracheal polyp or other soft tissue lesion  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  Follow-up chest x-ray without any acute abnormality, Aspiration precaution    Abnormal MRI  Assessment & Plan  Moderate periventricular and subcortical foci of white matter T2 hyperintensity which is nonspecific and most likely related to chronic small vessel ischemic changes  A few of these white matter lesions when perpendicular to the callosal septal interface raising the possibility of demyelinating disease  Other less likely etiologies include vasculitis, Lyme and migraines  Neurology input appreciated, likely all changes related to chronic small vessel ischemic changes  Follow up with Neurology after discharge    HLD (hyperlipidemia)  Assessment & Plan    Atorvastatin 80 mg daily        VTE Pharmacologic Prophylaxis:   Pharmacologic: Enoxaparin (Lovenox)  Mechanical VTE Prophylaxis in Place: Yes    Patient Centered Rounds: I have performed bedside rounds with nursing staff today  Discussions with Specialists or Other Care Team Provider:  Yes    Education and Discussions with Family / Patient:  Wife at bedside    Time Spent for Care: 45 minutes  More than 50% of total time spent on counseling and coordination of care as described above      Current Length of Stay: 5 day(s)    Current Patient Status: Inpatient   Certification Statement: The patient will continue to require additional inpatient hospital stay due to Uncontrolled hypertension, urinary retention    Discharge Plan:  Acute rehab in next 24 hours with stabilization of the blood pressure    Code Status: Level 1 - Full Code      Subjective:   Noted with fever again with T-max of 101 3 degree F last night with associated leukocytosis  Poor appetite and GI upset/nausea yesterday  Continues to hold urine but able to void when instructed  Denies any abdominal pain, chest pain, shortness of breath or cough  Denies flank pain  No bowel movement since 2 days ago  Chest x-ray did not reveal any acute abnormality, abdominal x-ray with possible ileus    Denies headache dizziness  Able to move right lower extremity better with more movement at knee and ankle joint  Underwent sleep study last night but patient sleep was interrupted due to ongoing fever      Objective:   Comfortably sitting in chair  Vitals: Body mass index is 27 6 kg/m²  Input and Output Summary (last 24 hours): Intake/Output Summary (Last 24 hours) at 8/5/2019 0954  Last data filed at 8/5/2019 8066  Gross per 24 hour   Intake    Output 1300 ml   Net -1300 ml       Physical Exam:     Physical Exam   Constitutional: He is oriented to person, place, and time  He appears well-developed  No distress  HENT:   Head: Normocephalic and atraumatic  Eyes: Pupils are equal, round, and reactive to light  Conjunctivae are normal    Neck: Normal range of motion  Neck supple  Cardiovascular: Normal rate and regular rhythm  Pulmonary/Chest: Effort normal  No respiratory distress  He has no wheezes  He has no rhonchi  He has no rales  He exhibits no tenderness  Diminished   Abdominal: Soft  Bowel sounds are normal  He exhibits distension (Mild)  There is no tenderness  There is no rebound and no guarding  Musculoskeletal: He exhibits no edema or deformity  Neurological: He is alert and oriented to person, place, and time  No cranial nerve deficit  He exhibits normal muscle tone     Right upper extremity weakness is unchanged  Right lower extremity 3/5 with improving movement at knee and ankle joint   Skin: Skin is warm and dry  No rash noted  Additional Data:     Labs: Today's labs are reviewed      lab    Results from last 7 days   Lab Units 08/04/19  0527   WBC Thousand/uL 6 19   HEMOGLOBIN g/dL 15 9   HEMATOCRIT % 48 5   PLATELETS Thousands/uL 226   NEUTROS PCT % 51   LYMPHS PCT % 35   MONOS PCT % 11   EOS PCT % 2     Results from last 7 days   Lab Units 07/31/19  1324   POTASSIUM mmol/L 3 7   CHLORIDE mmol/L 103   CO2 mmol/L 26   BUN mg/dL 13   CREATININE mg/dL 1 41*   CALCIUM mg/dL 8 9   ALK PHOS U/L 79   ALT U/L 50   AST U/L 29    < > = values in this interval not displayed  Results from last 7 days   Lab Units 07/31/19  1324   INR  1 09       * I Have Reviewed All Lab Data Listed Above  * Additional Pertinent [facility] Tests Reviewed: [facility]      Imaging:  Imaging Reports Reviewed Today Include:  CT scan, MRI      Recent Cultures (last 7 days): Today, Patient Was Seen By: Omar Rhoades MD    ** Please Note: "This note has been constructed using a voice recognition system  Therefore there may be syntax, spelling, and/or grammatical errors   Please call if you have any questions  "**

## 2019-08-05 NOTE — PHYSICAL THERAPY NOTE
PT TREATMENT     08/05/19 9374   Pain Assessment   Pain Assessment No/denies pain   Restrictions/Precautions   Other Precautions Fall Risk;Bed Alarm; Chair Alarm  (right hemiparesis)   General   Chart Reviewed Yes   Family/Caregiver Present Yes  (pt's wife)   Subjective   Subjective Pt initially stated he was "fine " When pt first sat up in recliner chair, pt states he is woozey and hot, and pt noted to be diaphoretic  RN aware and came to check pt/vitals  Transfers   Sit to Stand 3  Moderate assistance   Additional items Assist x 1;Verbal cues  (gait belt and right knee blocking)   Stand to Sit 3  Moderate assistance   Additional items Assist x 1;Verbal cues   Ambulation/Elevation   Gait Assistance 3  Moderate assist   Additional items Assist x 1;Verbal cues; Tactile cues   Assistive Device Francisco J-walker   Distance Pt stood x 2 reps x 3 mins each working on weight shifting and right knee control  On 2nd attempt, pt took one step fwd with LLE while PT blocked right knee;deferred further amb due to pt woozey and diaphoretic  Balance   Static Sitting Fair +   Static Standing Poor +   Dynamic Standing Poor   Ambulatory Poor   Activity Tolerance   Activity Tolerance Patient limited by fatigue;Treatment limited secondary to medical complications (Comment)  (woozey and diaphoretic)   Exercises   Heelslides PROM;AAROM;10 reps;Right  (in recliner chair)   Hip Abduction PROM;AAROM;10 reps;Right  (in recliner chair)   Knee AROM Long Arc Quad PROM;10 reps;Right;Sitting   Ankle Pumps PROM;10 reps;Right;Sitting   Neuro re-ed Pt worked on sitting balance edge of chair with gentle pushes/resistance to trunk x 3 reps multidirectional    Assessment   Assessment Pt noted with reports of woozey and hot, and pt became diaphoretic during PT treatment;due to this, pt with decreased tolerance to PT session  Pt will cont to benefit from skilled PT services     Plan   Treatment/Interventions ADL retraining;Functional transfer training;LE strengthening/ROM; Elevations; Therapeutic exercise; Endurance training;Patient/family training;Equipment eval/education; Bed mobility;Gait training; Compensatory technique education   PT Frequency Once a day   Recommendation   Recommendation Short-term skilled PT  (Acute Rehab)   Licensure   [place] License Number  Marci Martinez [id number]

## 2019-08-05 NOTE — PLAN OF CARE
Problem: Prexisting or High Potential for Compromised Skin Integrity  Goal: Skin integrity is maintained or improved  Description  INTERVENTIONS:  - Identify patients at risk for skin breakdown  - Assess and monitor skin integrity  - Assess and monitor nutrition and hydration status  - Monitor labs (i e  albumin)  - Assess for incontinence   - Turn and reposition patient  - Assist with mobility/ambulation  - Relieve pressure over bony prominences  - Avoid friction and shearing  - Provide appropriate hygiene as needed including keeping skin clean and dry  - Evaluate need for skin moisturizer/barrier cream  - Collaborate with interdisciplinary team (i e  Nutrition, Rehabilitation, etc )   - Patient/family teaching  Outcome: Progressing     Problem: NEUROSENSORY - ADULT  Goal: Achieves stable or improved neurological status  Description  INTERVENTIONS  - Monitor and report changes in neurological status  - Initiate measures to prevent increased intracranial pressure  - Maintain blood pressure and fluid volume within ordered parameters to optimize cerebral perfusion  - Monitor temperature, glucose, and sodium or any other associated labs   Initiate appropriate interventions as ordered  - Monitor for seizure activity   - Administer anti-seizure medications as ordered  Outcome: Progressing  Goal: Achieves maximal functionality and self care  Description  INTERVENTIONS  - Monitor swallowing and airway patency with patient fatigue and changes in neurological status  - Encourage and assist patient to increase activity and self care with guidance from rehab services  - Encourage visually impaired, hearing impaired and aphasic patients to use assistive/communication devices  Outcome: Progressing     Problem: MUSCULOSKELETAL - ADULT  Goal: Maintain or return mobility to safest level of function  Description  INTERVENTIONS:  - Assess patient's ability to carry out ADLs; assess patient's baseline for ADL function and identify physical deficits which impact ability to perform ADLs (bathing, care of mouth/teeth, toileting, grooming, dressing, etc )  - Assess/evaluate cause of self-care deficits   - Assess range of motion  - Assess patient's mobility; develop plan if impaired  - Assess patient's need for assistive devices and provide as appropriate  - Encourage maximum independence but intervene and supervise when necessary  - Involve family in performance of ADLs  - Assess for home care needs following discharge   - Request OT consult to assist with ADL evaluation and planning for discharge  - Provide patient education as appropriate  Outcome: Progressing  Goal: Maintain proper alignment of affected body part  Description  INTERVENTIONS:  - Support, maintain and protect limb and body alignment  - Provide pt/fam with appropriate education  Outcome: Progressing     Problem: CARDIOVASCULAR - ADULT  Goal: Maintains optimal cardiac output and hemodynamic stability  Description  INTERVENTIONS:  - Monitor I/O, vital signs and rhythm  - Monitor for S/S and trends of decreased cardiac output i e  bleeding, hypotension  - Administer and titrate ordered vasoactive medications to optimize hemodynamic stability  - Assess quality of pulses, skin color and temperature  - Assess for signs of decreased coronary artery perfusion - ex  Angina  - Instruct patient to report change in severity of symptoms  Outcome: Progressing     Problem: Potential for Falls  Goal: Patient will remain free of falls  Description  INTERVENTIONS:  - Assess patient frequently for physical needs  -  Identify cognitive and physical deficits and behaviors that affect risk of falls    -  Farina fall precautions as indicated by assessment   - Educate patient/family on patient safety including physical limitations  - Instruct patient to call for assistance with activity based on assessment  - Modify environment to reduce risk of injury  - Consider OT/PT consult to assist with strengthening/mobility  Outcome: Progressing     Problem: Neurological Deficit  Goal: Neurological status is stable or improving  Description  Interventions:  - Monitor and assess patient's level of consciousness, motor function, sensory function, and level of assistance needed for ADLs  - Monitor and report changes from baseline  Collaborate with interdisciplinary team to initiate plan and implement interventions as ordered  - Provide and maintain a safe environment  - Utilize seizure precautions  - Utilize fall precautions  - Utilize aspiration precautions  - Utilize bleeding precautions  Outcome: Progressing     Problem: Activity Intolerance/Impaired Mobility  Goal: Mobility/activity is maintained at optimum level for patient  Description  Interventions:  - Assess and monitor patient  barriers to mobility and need for assistive/adaptive devices  - Assess patient's emotional response to limitations  - Collaborate with interdisciplinary team and initiate plans and interventions as ordered  - Encourage independent activity per ability   - Maintain proper body alignment  - Perform active/passive rom as tolerated/ordered  - Plan activities to conserve energy   - Turn patient  Outcome: Progressing     Problem: Communication Impairment  Goal: Ability to express needs and understand communication  Description  Assess patient's communication skills and ability to understand information  Patient will demonstrate use of effective communication techniques, alternative methods of communication and understanding even if not able to speak  - Encourage communication and provide alternate methods of communication as needed  - Collaborate with case management/ for discharge needs  - Include patient/family/caregiver in decisions related to communication    Outcome: Progressing     Problem: Potential for Aspiration  Goal: Non-ventilated patient's risk of aspiration is minimized  Description  Assess and monitor vital signs, respiratory status, and labs (WBC)  Monitor for signs of aspiration (tachypnea, cough, rales, wheezing, cyanosis, fever)  - Assess and monitor patient's ability to swallow  - Place patient up in chair to eat if possible  - HOB up at 90 degrees to eat if unable to get patient up into chair   - Supervise patient during oral intake  - Instruct patient to take small bites  - Instruct patient to take small single sips when taking liquids  - Follow patient-specific strategies generated by speech pathologist   Outcome: Progressing     Problem: Nutrition/Hydration-ADULT  Goal: Nutrient/Hydration intake appropriate for improving, restoring or maintaining nutritional needs  Description  Monitor and assess patient's nutrition/hydration status for malnutrition (ex- brittle hair, bruises, dry skin, pale skin and conjunctiva, muscle wasting, smooth red tongue, and disorientation)  Collaborate with interdisciplinary team and initiate plan and interventions as ordered  Monitor patient's weight and dietary intake as ordered or per policy  Utilize nutrition screening tool and intervene per policy  Determine patient's food preferences and provide high-protein, high-caloric foods as appropriate       INTERVENTIONS:  - Monitor oral intake, urinary output, labs, and treatment plans  - Assess nutrition and hydration status and recommend course of action  - Evaluate amount of meals eaten  - Assist patient with eating if necessary   - Allow adequate time for meals  - Recommend/ encourage appropriate diets, oral nutritional supplements, and vitamin/mineral supplements  - Order, calculate, and assess calorie counts as needed  - Recommend, monitor, and adjust tube feedings and TPN/PPN based on assessed needs  - Assess need for intravenous fluids  - Provide specific nutrition/hydration education as appropriate  - Include patient/family/caregiver in decisions related to nutrition  Outcome: Progressing

## 2019-08-05 NOTE — QUICK NOTE
Notified by nursing of patient's fever and nausea  Patient seen and examined, he is withdrawn and appears depressed  He answers yes or no questions but does not elaborate  Patient denies any pain or discomfort anywhere  He is noted to have a productive cough  Patient and family at bedside report he moved his bowels yesterday  He does report mild nausea with no vomiting  Physical Exam   Constitutional: He is oriented to person, place, and time  He appears well-developed and well-nourished  HENT:   Head: Normocephalic  Cardiovascular: Normal rate and regular rhythm  Murmur heard  Pulmonary/Chest: Effort normal  He has rales (left base)  Abdominal: Soft  Bowel sounds are normal  He exhibits distension (mild)  There is no tenderness  Musculoskeletal: He exhibits no edema  Neurological: He is alert and oriented to person, place, and time  Skin: Skin is warm and dry  Nursing note and vitals reviewed  Assessment/plan:  Stat labs revealed a new leukocytosis, normal lactate   Slight rise in Cr  CXR was done which by review did not show any abnormalities but there was some dilatation of the bowel - KUB with distended loops of bowel; radiology paged for stat read - obstruction vs ileus  NPO, consider NGT if patient develops vomiting - clinically does not appear obstructed and moved his bowels yesterday; surgery consult in AM  Start empiric cefepime and flagyl pending cultures and further workup  Gentle IV hydration, hold demadex

## 2019-08-05 NOTE — ASSESSMENT & PLAN NOTE
Noted to have fever of 101 3 degree F with associated leukocytosis up to 16  Possible source includes urinary tract infection  Lactic acid within normal limit  Blood cultures- prelim negative at 24 hours  Chest x-ray without any acute abnormality  UA is suggestive for UTI, Urine cx prelim was too pending at discharge  X-ray of the abdomen with possible ileus   CT A/P showed no obstruction  Started on ceftriaxone, with resolution of fevers and leukocytosis  Discharged with Augmentin and follow up urology

## 2019-08-05 NOTE — PROGRESS NOTES
[facility] Progress Note  Patient: Krupa Zavaleta 60 y o  male   MRN: [id number]  PCP: Gianna Can MD  Unit/Bed#: [address] [place] [address] Encounter: [id number]  Date Of Visit: 08/04/19    Problem List:    Principal Problem:    Lacunar infarct, acute ([place] [address] )  Active Problems:    Hypertensive emergency    Type 2 MI    Stage 3 chronic kidney disease ([place] [address] )    Low grade fever    Urinary retention    HLD (hyperlipidemia)    Abnormal MRI    Abnormal finding on CT scan      Assessment & Plan:    * Lacunar infarct, acute [facility])  Assessment & Plan  Patient presented with worsening right-sided weakness to ED, symptoms actually started evening earlier but patient did not present at that time  CT head and CTA of the head and neck without any acute abnormality or large vessel occlusion  NIHSS on presentation at least 5  Not a candidate for tPA due to out of window  Initially was admitted to ICU due to hypertensive emergency requiring Cardene drip  Patient reports history of long-term uncontrolled hypertension, likely etiology    Resistant hypertension concerning for secondary component  · MRI of the brain- Acute appearing lacunar infarct at the posterior left corona radiata  · 2D echo-EF 63-08%, grade 1 diastolic dysfunction, negative bubble study, PA pressure 35  · LDL -130  · Hemoglobin A1c is 5 8  · Neurology input appreciated  · Dual antiplatelet aspirin and Plavix for at least 90 days, atorvastatin 80 mg daily  · Continue PT/OT/ST  · Require further optimization of the blood pressure to prevent future similar complications  · Neurology follow-up    Stage 3 chronic kidney disease ([place] [address] )  Assessment & Plan  Records reviewed from PMD is office, creatinine 1 38   Creatinine 1 41 on presentation, 1 45 today  Status post CTA with contrast  Denies any urinary complaints  UA noted with proteinuria  Bladder scan with evidence of urinary retention, but able to void when encouraged   Monitor PVR  Monitor intake output  Hold nephrotoxins  Nephrology evaluation    Type 2 MI  Assessment & Plan  Presented with elevated troponin in setting of acute CVA  EKG normal sinus rhythm, LVH strain pattern  Troponin 0 32-0 30  Cardiology input noted  Status post nuclear stress test on 08/03-no evidence of ischemia  Continue medical management  Cardiology following    Hypertensive emergency  Assessment & Plan  In setting of acute CVA with progressive symptoms  Initially admitted to ICU , continued on Cardene drip  Reports history of uncontrolled hypertension  Status post Cardene drip  · Resumed on Bystolic, losartan, hydralazine, amlodipine  · Hydralazine was increased yesterday and doxazosin was added  · Renal duplex without any evidence of renal artery stenosis  · Check plasma metanephrines, renin/aldosterone levels  · Still with episodic significantly elevated blood pressure  · Cardiology following  · Nephrology evaluation for secondary hypertension    Urinary retention  Assessment & Plan  Likely secondary to CVA and constipation  Constipation has improved  Activity as tolerated  Urinary retention protocol  Bowel regimen  Starting Doxazosin as above    Low grade fever  Assessment & Plan  No further episodes  No clinical evidence of infection at present  White count remains within normal limit  Denies headache, shortness of breath, cough, chest pain, dysuria  Incentive spirometry  Aspiration precaution  Monitor    Abnormal finding on CT scan  Assessment & Plan  Frothy soft tissue abnormality in the nondependent portion of the trachea with imaging features favoring aspiration     Differential diagnosis could include tracheal polyp or other soft tissue lesion    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  Aspiration precaution    Abnormal MRI  Assessment & Plan  Moderate periventricular and subcortical foci of white matter T2 hyperintensity which is nonspecific and most likely related to chronic small vessel ischemic changes  A few of these white matter lesions when perpendicular to the callosal septal interface raising the possibility of demyelinating disease  Other less likely etiologies include vasculitis, Lyme and migraines  Neurology input appreciated, likely all changes related to chronic small vessel ischemic changes  Follow up with Neurology after discharge    HLD (hyperlipidemia)  Assessment & Plan    Atorvastatin 80 mg daily        VTE Pharmacologic Prophylaxis:   Pharmacologic: Enoxaparin (Lovenox)  Mechanical VTE Prophylaxis in Place: Yes    Patient Centered Rounds: I have performed bedside rounds with nursing staff today  Discussions with Specialists or Other Care Team Provider:  Dr Jenae Hess, Dr Killian Byrd    Education and Discussions with Family / Patient:  Wife at bedside    Time Spent for Care: 45 minutes  More than 50% of total time spent on counseling and coordination of care as described above  Current Length of Stay: 4 day(s)    Current Patient Status: Inpatient   Certification Statement: The patient will continue to require additional inpatient hospital stay due to Uncontrolled hypertension, urinary retention    Discharge Plan:  Acute rehab in next 24 hours with stabilization of the blood pressure    Code Status: Level 1 - Full Code      Subjective:   Right-sided weakness remains unchanged but improving with physical therapy  Was able to ambulate  No further fever  Blood pressure still remains elevated as high as 200/100 yesterday despite multiple antihypertensives  Denies chest pain, shortness of breath  Denies headache or dizziness  Appetite fair  Good bowel movement yesterday  Still noted to have urinary retention but able to void spontaneously when encouraged    Patient was presented on 07/31/2019 with right-sided weakness  He had developed mild right-sided weakness a day prior but was able to ambulate  Next day, he was unable to use his right side and presented to ED    Patient's blood pressure noted to be significantly elevated in emergency room  While in ED patient had worsening of weakness and developed lethargy diaphoresis nausea  Patient was subsequently started on nicardipine drip and was transferred to ICU  Patient is unable to patient is unable to right upper extremity since then  Lower extremity patient is able to elevate thigh without gravity but unable to distally  As per the patient and wife, patient's right-sided weakness has been stable since then without any improvement or worsening  Patient is motivated to participate in physical therapy but feels depressed because of his situation  Objective:   Comfortably sitting in chair  Vitals: Body mass index is 27 81 kg/m²  Input and Output Summary (last 24 hours): Intake/Output Summary (Last 24 hours) at 8/4/2019 2352  Last data filed at 8/4/2019 1634  Gross per 24 hour   Intake 640 ml   Output 1650 ml   Net -1010 ml       Physical Exam:     Physical Exam   Constitutional: He is oriented to person, place, and time  No distress  HENT:   Head: Normocephalic and atraumatic  Eyes: Pupils are equal, round, and reactive to light  Conjunctivae are normal    Neck: Normal range of motion  Neck supple  Cardiovascular: Normal rate, regular rhythm and normal heart sounds  Pulmonary/Chest: Effort normal  No respiratory distress  He has no wheezes  He has no rhonchi  He has no rales  He exhibits no tenderness  Diminished   Abdominal: Soft  Bowel sounds are normal  He exhibits no distension  There is no tenderness  There is no rebound and no guarding  Musculoskeletal: He exhibits no edema  Neurological: He is alert and oriented to person, place, and time  No cranial nerve deficit  Right-sided weakness remains unchanged  Skin: Skin is warm and dry  No rash noted          Additional Data:     Labs:    Results from last 7 days   Lab Units 08/04/19  0527   WBC Thousand/uL 6 19   HEMOGLOBIN g/dL 15 9 HEMATOCRIT % 48 5   PLATELETS Thousands/uL 226   NEUTROS PCT % 51   LYMPHS PCT % 35   MONOS PCT % 11   EOS PCT % 2     Results from last 7 days   Lab Units 07/31/19  1324   POTASSIUM mmol/L 3 7   CHLORIDE mmol/L 103   CO2 mmol/L 26   BUN mg/dL 13   CREATININE mg/dL 1 41*   CALCIUM mg/dL 8 9   ALK PHOS U/L 79   ALT U/L 50   AST U/L 29    < > = values in this interval not displayed  Results from last 7 days   Lab Units 07/31/19  1324   INR  1 09       * I Have Reviewed All Lab Data Listed Above  * Additional Pertinent Lab Tests Reviewed: [facility] Admission Reviewed      Imaging:  Imaging Reports Reviewed Today Include:  CT scan, MRI      Recent Cultures (last 7 days): Today, Patient Was Seen By: Davide Santiago MD    ** Please Note: "This note has been constructed using a voice recognition system  Therefore there may be syntax, spelling, and/or grammatical errors   Please call if you have any questions  "**

## 2019-08-05 NOTE — CONSULTS
H&P Exam - Urology       Patient: Anthony Kapadia   : 1955 Sex: male   MRN: [id number]     CSN: [id number]      History of Present Illness   HPI:  Anthony Kapadia is a 60 y o  male who presents with right-sided weakness presenting to the emergency room found to have CVA noted on the floor to have high residual urines started on multiple and hypertension medications including doxazosin 2 mg  patient when seen at the bedside sitting in chair states that he feels fine he voids at 2-3 hour intervals his stream is fair he gets up once or twice a night         Review of Systems:   Constitutional:  Negative for activity change, fever, chills and diaphoresis  HENT: Negative for hearing loss and trouble swallowing  Eyes: Negative for itching and visual disturbance  Respiratory: Negative for chest tightness and shortness of breath  Cardiovascular: Negative for chest pain, edema  Gastrointestinal: Negative for abdominal distention, na abdominal pain, constipation, diarrhea, Nausea and vomiting  Genitourinary: Negative for decreased urine volume, difficulty urinating, dysuria, enuresis, frequency, hematuria and urgency  Musculoskeletal: Negative for gait problem and myalgias  Neurological: Negative for dizziness and headaches  Hematological: Does not bruise/bleed easily  Historical Information   Past Medical History:   Diagnosis Date    Hyperlipidemia     Hypertension      History reviewed  No pertinent surgical history  Social History   Social History     Substance and Sexual Activity   Alcohol Use Never    Frequency: Never     Social History     Substance and Sexual Activity   Drug Use Never     Social History     Tobacco Use   Smoking Status Never Smoker   Smokeless Tobacco Never Used     Family History: History reviewed  No pertinent family history      Meds/Allergies   Medications Prior to Admission   Medication    amlodipine-olmesartan (TED) 10-40 MG    aspirin (ECOTRIN LOW STRENGTH) 81 mg EC tablet    Multiple Vitamin (MULTIVITAMIN) tablet    nebivolol (BYSTOLIC) 20 MG tablet    simvastatin (ZOCOR) 10 mg tablet    spironolactone (ALDACTONE) 25 mg tablet     No Known Allergies    Objective   Vitals: /70 (BP Location: Left arm)   Pulse 75   Temp 98 5 °F (36 9 °C) (Oral)   Resp 20   Ht 6' 1" (1 854 m)   Wt 94 9 kg (209 lb 3 5 oz)   SpO2 95%   BMI 27 60 kg/m²     Physical Exam:  General Alert awake   Normocephalic atraumatic PERRLA  Lungs clear bilaterally  Cardiac normal S1 normal S2  Abdomen soft, flank pain  Extremities no edema    I/O last 24 hours:   In: -   Out: 1225 [Urine:1225]    Invasive Devices     Peripheral Intravenous Line            Peripheral IV 08/04/19 Left;Ventral (anterior) Hand less than 1 day                    Lab Results: CBC:   Lab Results   Component Value Date    WBC 13 18 (H) 08/05/2019    HGB 14 6 08/05/2019    HCT 44 7 08/05/2019    MCV 96 08/05/2019     08/05/2019    MCH 31 2 08/05/2019    MCHC 32 7 08/05/2019    RDW 14 2 08/05/2019    MPV 12 3 08/05/2019    NRBC 0 08/05/2019     CMP:   Lab Results   Component Value Date     08/05/2019    CO2 23 08/05/2019    BUN 23 08/05/2019    CREATININE 1 78 (H) 08/05/2019    CALCIUM 8 4 08/05/2019    AST 23 08/05/2019    ALT 35 08/05/2019    ALKPHOS 56 08/05/2019    EGFR 46 08/05/2019     Urinalysis:   Lab Results   Component Value Date    COLORU Marla 08/05/2019    CLARITYU Clear 08/05/2019    SPECGRAV 1 015 08/05/2019    PHUR 5 5 08/05/2019    LEUKOCYTESUR Trace (A) 08/05/2019    NITRITE Positive (A) 08/05/2019    GLUCOSEU Negative 08/05/2019    KETONESU Negative 08/05/2019    BILIRUBINUR Negative 08/05/2019    BLOODU Negative 08/05/2019     Urine Culture: No results found for: URINECX  PSA: No results found for: PSA        Assessment/ Plan:  High residual urines  CVA  Fair stream  Nocturia x1  Patient on doxazosin 2 mg tabs apparently emptying better  Last PVR 60 cc  Continue doxazosin  PSA  Will see in the office for follow-up in a few weeks    Lattie Hatchet, MD

## 2019-08-05 NOTE — PROGRESS NOTES
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  - UA: 2+ protein  - renal ultrasound: no hydronephrosis   - repeat renal ultrasound recommended in 6 months to follow up left renal hypoechoic lesion  - started on IVF at 1am 8/5 likely due to increase in creatinine overnight, can continue for now  2  Proteinuria- mild, UPC ratio is 0 3  3  Hypertensive Emergency- BP much improved from admission  - continue to monitor and trend as blood pressures during the day today have been acceptable  - rule out secondary causes: renal artery doppler negative, metanephrines pending, need renin/aldosterone levels in 6 weeks (due to patient being on spironolactone), tsh normal, overnight pulse ox pending  - currently on amlodipine 10mg daily, hydralazine 50mg q8h, losartan 458IR daily, bystolic 49BH daily, and [id number] daily (added 8/4) but will decrease to 5mg daily starting 8/6  4  Elevated bilirubin- per primary team    SUBJECTIVE:  Patient without acute complaints  Family at bedside      OBJECTIVE:  Current Weight: Weight - Scale: 94 9 kg (209 lb 3 5 oz)  Vitals:    08/05/19 0626 08/05/19 0757 08/05/19 1438 08/05/19 1546   BP: 128/70 148/68 145/64 143/70   BP Location: Left arm Left arm Left arm Left arm   Pulse: 62 67 77 75   Resp:  18 18 20   Temp:  98 3 °F (36 8 °C) 98 8 °F (37 1 °C) 98 5 °F (36 9 °C)   TempSrc:  Oral Oral Oral   SpO2: 99% 97% 93% 95%   Weight:       Height:           Intake/Output Summary (Last 24 hours) at 8/5/2019 1616  Last data filed at 8/5/2019 1516  Gross per 24 hour   Intake    Output 1775 ml   Net -1775 ml     General: NAD  Skin: no rash  HEENT: normocephalic  Neck: supple  Chest: CTAB  Heart: RRR  Abdomen: soft nt nd  Extremities: no edema  Neuro: alert awake  Psych: mood and affect appropriate    Medications:    Current Facility-Administered Medications:     acetaminophen (TYLENOL) tablet 650 mg, 650 mg, Oral, Q6H PRN, Josiane Johnson MD, 650 mg at 08/04/19 2239    aluminum-magnesium hydroxide-simethicone (MYLANTA) 200-200-20 mg/5 mL oral suspension 30 mL, 30 mL, Oral, Q4H PRN, Josiane Johnson MD, 30 mL at 08/04/19 1913    AMILoride tablet 10 mg, 10 mg, Oral, Daily, Gearld Boxer, MD, 10 mg at 08/05/19 0908    amLODIPine (NORVASC) tablet 10 mg, 10 mg, Oral, Daily, Gearld Boxer, MD, 10 mg at 08/05/19 0908    aspirin (ECOTRIN LOW STRENGTH) EC tablet 81 mg, 81 mg, Oral, Daily, Josiane Johnson MD, 81 mg at 08/05/19 0908    atorvastatin (LIPITOR) tablet 80 mg, 80 mg, Oral, Daily With Maria Victoria Lee MD, 80 mg at 08/04/19 1606    cefTRIAXone (ROCEPHIN) IVPB (premix) 1,000 mg, 1,000 mg, Intravenous, Q24H, Josiane Johnson MD    clopidogrel (PLAVIX) tablet 75 mg, 75 mg, Oral, Daily, Josiane Johnson MD, 75 mg at 08/05/19 0908    docusate sodium (COLACE) capsule 100 mg, 100 mg, Oral, BID, Josiane Johnson MD, 100 mg at 08/05/19 0908    enoxaparin (LOVENOX) subcutaneous injection 40 mg, 40 mg, Subcutaneous, Daily, Josiane Johnson MD, 40 mg at 08/05/19 0908    hydrALAZINE (APRESOLINE) injection 10 mg, 10 mg, Intravenous, Q4H PRN, Josiane Johnson MD, 10 mg at 08/02/19 1520    hydrALAZINE (APRESOLINE) tablet 50 mg, 50 mg, Oral, Q8H [address], Gearld Boxer, MD, 50 mg at 08/05/19 1440    losartan (COZAAR) tablet 100 mg, 100 mg, Oral, Daily, Gearld Boxer, MD, 100 mg at 08/05/19 0908    nebivolol (BYSTOLIC) tablet 20 mg, 20 mg, Oral, HS, Josiane Johnson MD, 20 mg at 08/03/19 2112    ondansetron (ZOFRAN) injection 4 mg, 4 mg, Intravenous, Q6H PRN, SALVATORE Caldwell, 4 mg at 08/04/19 2051    pantoprazole (PROTONIX) EC tablet 40 mg, 40 mg, Oral, Early Morning, Janina Brumfield MD, 40 mg at 08/05/19 0627    sodium chloride 0 9 % infusion, 75 mL/hr, Intravenous, Continuous, SALVATORE Caldwell, Last Rate: 75 mL/hr at 08/05/19 0211, 75 mL/hr at 08/05/19 0211    Laboratory Results:  Results from last 7 days   Lab Units 08/05/19  0457 08/04/19  2215 08/04/19  0527 08/03/19  0450 08/02/19  0514 08/01/19  0530 07/31/19  1324   WBC Thousand/uL 13 18* 16 53* 6 19 6 51 8 35 8 96 7 16   HEMOGLOBIN g/dL 14 6 15 8 15 9 15 6 17 2* 16 3 16 4   HEMATOCRIT % 44 7 46 8 48 5 47 0 52 1* 48 6 50 2*   PLATELETS Thousands/uL 187 198 226 217 241 229 221   POTASSIUM mmol/L 3 9 3 8 3 7 3 4* 4 0 3 7 3 7   CHLORIDE mmol/L 104 102 106 105 103 103 103   CO2 mmol/L 23 23 23 22 29 26 26   BUN mg/dL 23 21 15 16 18 13 13   CREATININE mg/dL 1 78* 1 74* 1 45* 1 44* 1 53* 1 43* 1 41*   CALCIUM mg/dL 8 4 9 1 8 6 8 5 8 9 9 0 8 9   MAGNESIUM mg/dL 2 3  --   --   --   --  2 1  --    PHOSPHORUS mg/dL  --   --   --   --   --  3 5  --

## 2019-08-06 VITALS
RESPIRATION RATE: 20 BRPM | TEMPERATURE: 97.8 F | HEIGHT: 73 IN | WEIGHT: 209.1 LBS | DIASTOLIC BLOOD PRESSURE: 71 MMHG | SYSTOLIC BLOOD PRESSURE: 139 MMHG | OXYGEN SATURATION: 95 % | HEART RATE: 61 BPM | BODY MASS INDEX: 27.71 KG/M2

## 2019-08-06 PROBLEM — N30.00 ACUTE CYSTITIS WITHOUT HEMATURIA: Status: ACTIVE | Noted: 2019-08-06

## 2019-08-06 LAB
ALBUMIN SERPL BCP-MCNC: 3 G/DL (ref 3.5–5)
ALP SERPL-CCNC: 56 U/L (ref 46–116)
ALT SERPL W P-5'-P-CCNC: 43 U/L (ref 12–78)
ANION GAP SERPL CALCULATED.3IONS-SCNC: 9 MMOL/L (ref 4–13)
AST SERPL W P-5'-P-CCNC: 36 U/L (ref 5–45)
BASOPHILS # BLD AUTO: 0.05 THOUSANDS/ΜL (ref 0–0.1)
BASOPHILS NFR BLD AUTO: 1 % (ref 0–1)
BILIRUB SERPL-MCNC: 0.8 MG/DL (ref 0.2–1)
BUN SERPL-MCNC: 16 MG/DL (ref 5–25)
CALCIUM SERPL-MCNC: 8.7 MG/DL (ref 8.3–10.1)
CHLORIDE SERPL-SCNC: 105 MMOL/L (ref 100–108)
CO2 SERPL-SCNC: 23 MMOL/L (ref 21–32)
CREAT SERPL-MCNC: 1.48 MG/DL (ref 0.6–1.3)
EOSINOPHIL # BLD AUTO: 0.17 THOUSAND/ΜL (ref 0–0.61)
EOSINOPHIL NFR BLD AUTO: 3 % (ref 0–6)
ERYTHROCYTE [DISTWIDTH] IN BLOOD BY AUTOMATED COUNT: 14.1 % (ref 11.6–15.1)
GFR SERPL CREATININE-BSD FRML MDRD: 58 ML/MIN/1.73SQ M
GLUCOSE SERPL-MCNC: 92 MG/DL (ref 65–140)
HCT VFR BLD AUTO: 43.9 % (ref 36.5–49.3)
HGB BLD-MCNC: 14.2 G/DL (ref 12–17)
IMM GRANULOCYTES # BLD AUTO: 0.02 THOUSAND/UL (ref 0–0.2)
IMM GRANULOCYTES NFR BLD AUTO: 0 % (ref 0–2)
LYMPHOCYTES # BLD AUTO: 1.08 THOUSANDS/ΜL (ref 0.6–4.47)
LYMPHOCYTES NFR BLD AUTO: 18 % (ref 14–44)
MCH RBC QN AUTO: 30.7 PG (ref 26.8–34.3)
MCHC RBC AUTO-ENTMCNC: 32.3 G/DL (ref 31.4–37.4)
MCV RBC AUTO: 95 FL (ref 82–98)
MONOCYTES # BLD AUTO: 0.62 THOUSAND/ΜL (ref 0.17–1.22)
MONOCYTES NFR BLD AUTO: 11 % (ref 4–12)
NEUTROPHILS # BLD AUTO: 3.95 THOUSANDS/ΜL (ref 1.85–7.62)
NEUTS SEG NFR BLD AUTO: 67 % (ref 43–75)
NRBC BLD AUTO-RTO: 0 /100 WBCS
PLATELET # BLD AUTO: 176 THOUSANDS/UL (ref 149–390)
PMV BLD AUTO: 12.1 FL (ref 8.9–12.7)
POTASSIUM SERPL-SCNC: 3.9 MMOL/L (ref 3.5–5.3)
PROCALCITONIN SERPL-MCNC: 3.99 NG/ML
PROT SERPL-MCNC: 6.7 G/DL (ref 6.4–8.2)
RBC # BLD AUTO: 4.63 MILLION/UL (ref 3.88–5.62)
SODIUM SERPL-SCNC: 137 MMOL/L (ref 136–145)
WBC # BLD AUTO: 5.89 THOUSAND/UL (ref 4.31–10.16)

## 2019-08-06 PROCEDURE — 97535 SELF CARE MNGMENT TRAINING: CPT

## 2019-08-06 PROCEDURE — 99239 HOSP IP/OBS DSCHRG MGMT >30: CPT | Performed by: STUDENT IN AN ORGANIZED HEALTH CARE EDUCATION/TRAINING PROGRAM

## 2019-08-06 PROCEDURE — 84145 PROCALCITONIN (PCT): CPT | Performed by: INTERNAL MEDICINE

## 2019-08-06 PROCEDURE — 99232 SBSQ HOSP IP/OBS MODERATE 35: CPT | Performed by: INTERNAL MEDICINE

## 2019-08-06 PROCEDURE — 85025 COMPLETE CBC W/AUTO DIFF WBC: CPT | Performed by: INTERNAL MEDICINE

## 2019-08-06 PROCEDURE — 97110 THERAPEUTIC EXERCISES: CPT

## 2019-08-06 PROCEDURE — 80053 COMPREHEN METABOLIC PANEL: CPT | Performed by: INTERNAL MEDICINE

## 2019-08-06 RX ORDER — AMLODIPINE BESYLATE 10 MG/1
10 TABLET ORAL DAILY
Refills: 0
Start: 2019-08-07

## 2019-08-06 RX ORDER — ATORVASTATIN CALCIUM 80 MG/1
80 TABLET, FILM COATED ORAL
Refills: 0
Start: 2019-08-06

## 2019-08-06 RX ORDER — DOCUSATE SODIUM 100 MG/1
100 CAPSULE, LIQUID FILLED ORAL 2 TIMES DAILY
Qty: 10 CAPSULE | Refills: 0
Start: 2019-08-06

## 2019-08-06 RX ORDER — CLOPIDOGREL BISULFATE 75 MG/1
75 TABLET ORAL DAILY
Refills: 0
Start: 2019-08-07

## 2019-08-06 RX ORDER — LOSARTAN POTASSIUM 100 MG/1
100 TABLET ORAL DAILY
Refills: 0
Start: 2019-08-07

## 2019-08-06 RX ORDER — AMILORIDE HYDROCHLORIDE 5 MG/1
5 TABLET ORAL DAILY
Refills: 0
Start: 2019-08-07

## 2019-08-06 RX ORDER — TAMSULOSIN HYDROCHLORIDE 0.4 MG/1
0.4 CAPSULE ORAL
Qty: 30 CAPSULE | Refills: 0
Start: 2019-08-06

## 2019-08-06 RX ORDER — HYDRALAZINE HYDROCHLORIDE 50 MG/1
50 TABLET, FILM COATED ORAL EVERY 8 HOURS SCHEDULED
Refills: 0
Start: 2019-08-06

## 2019-08-06 RX ORDER — ACETAMINOPHEN 325 MG/1
650 TABLET ORAL EVERY 6 HOURS PRN
Qty: 30 TABLET | Refills: 0
Start: 2019-08-06

## 2019-08-06 RX ORDER — AMOXICILLIN AND CLAVULANATE POTASSIUM 875; 125 MG/1; MG/1
1 TABLET, FILM COATED ORAL EVERY 12 HOURS SCHEDULED
Qty: 8 TABLET | Refills: 0
Start: 2019-08-06 | End: 2019-08-10

## 2019-08-06 RX ORDER — MAGNESIUM HYDROXIDE/ALUMINUM HYDROXICE/SIMETHICONE 120; 1200; 1200 MG/30ML; MG/30ML; MG/30ML
30 SUSPENSION ORAL EVERY 4 HOURS PRN
Qty: 355 ML | Refills: 0
Start: 2019-08-06

## 2019-08-06 RX ORDER — PANTOPRAZOLE SODIUM 40 MG/1
40 TABLET, DELAYED RELEASE ORAL
Refills: 0
Start: 2019-08-07

## 2019-08-06 RX ADMIN — AMILORIDE HYDROCLORIDE 5 MG: 5 TABLET ORAL at 09:00

## 2019-08-06 RX ADMIN — DOCUSATE SODIUM 100 MG: 100 CAPSULE, LIQUID FILLED ORAL at 08:58

## 2019-08-06 RX ADMIN — LOSARTAN POTASSIUM 100 MG: 50 TABLET ORAL at 08:58

## 2019-08-06 RX ADMIN — ASPIRIN 81 MG: 81 TABLET, COATED ORAL at 08:58

## 2019-08-06 RX ADMIN — ATORVASTATIN CALCIUM 80 MG: 80 TABLET ORAL at 16:35

## 2019-08-06 RX ADMIN — PANTOPRAZOLE SODIUM 40 MG: 40 TABLET, DELAYED RELEASE ORAL at 05:34

## 2019-08-06 RX ADMIN — HYDRALAZINE HYDROCHLORIDE 50 MG: 25 TABLET ORAL at 13:50

## 2019-08-06 RX ADMIN — AMLODIPINE BESYLATE 10 MG: 10 TABLET ORAL at 08:58

## 2019-08-06 RX ADMIN — CLOPIDOGREL BISULFATE 75 MG: 75 TABLET ORAL at 08:58

## 2019-08-06 RX ADMIN — ENOXAPARIN SODIUM 40 MG: 40 INJECTION SUBCUTANEOUS at 08:58

## 2019-08-06 NOTE — PROGRESS NOTES
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  - UA: 2+ protein  - renal ultrasound: no hydronephrosis              - repeat renal ultrasound recommended in 6 months to follow up left renal hypoechoic lesion  - started on IVF at 1am 8/5 likely due to increase in creatinine overnight, can continue for now  2  Proteinuria- mild, UPC ratio is 0 3,  Would recommend repeating in 6 months when he is in a more stable state  3  Hypertensive Emergency- BP much improved from admission,  Overall much improved  - continue to monitor and trend as blood pressures during the day today have been acceptable  - rule out secondary causes: renal artery doppler negative, metanephrines pending, need renin/aldosterone levels in 6 weeks (due to patient being on spironolactone), tsh normal, overnight pulse ox pending  - currently on amlodipine 10mg daily, hydralazine 50mg q8h, losartan 091YS daily, bystolic 03JY daily, and amiloride 5 mg  4  Elevated bilirubin- per primary team     renal function overall has stabilized  Blood pressure overall is much better controlled  If continues to start to rise again can increase the Amiloride back to 10 mg  At this time stable from a renal standpoint for discharge I will put our follow-up information in the discharge paperwork as he needs to check if he is within their network before proceeding with follow-up  There is any questions concerns please feel free to call me  Thank you    At this time will sign off      SUBJECTIVE / INTERVAL HISTORY:     no overnight events    OBJECTIVE:  Current Weight: Weight - Scale: 94 8 kg (209 lb 1 6 oz)  Vitals: 08/05/19 2350 08/06/19 0532 08/06/19 0541 08/06/19 0714   BP: 144/69 128/63  160/78   BP Location: Left arm Left arm  Left arm   Pulse: 70 62  61   Resp: 20 20  20   Temp: 100 3 °F (37 9 °C) 98 3 °F (36 8 °C)  97 8 °F (36 6 °C)   TempSrc: Oral Oral  Tympanic   SpO2: 95% 94%  95%   Weight:   94 8 kg (209 lb 1 6 oz)    Height:           Intake/Output Summary (Last 24 hours) at 8/6/2019 0942  Last data filed at 8/6/2019 0800  Gross per 24 hour   Intake 360 ml   Output 1195 ml   Net -835 ml       Review of Systems:    12 point ROS has been reviewed  Physical Exam   Constitutional: He is oriented to person, place, and time  He appears well-developed and well-nourished  No distress  HENT:   Head: Normocephalic and atraumatic  Eyes: Pupils are equal, round, and reactive to light  No scleral icterus  Neck: Normal range of motion  Neck supple  Cardiovascular: Normal rate, regular rhythm and normal heart sounds  Exam reveals no gallop and no friction rub  No murmur heard  Pulmonary/Chest: Effort normal and breath sounds normal  No respiratory distress  He has no wheezes  He has no rales  He exhibits no tenderness  Abdominal: Soft  Bowel sounds are normal  He exhibits no distension  There is no tenderness  There is no rebound  Musculoskeletal: Normal range of motion  He exhibits no edema  Neurological: He is alert and oriented to person, place, and time  Skin: No rash noted  He is not diaphoretic  Psychiatric: He has a normal mood and affect  Nursing note and vitals reviewed        Medications:    Current Facility-Administered Medications:     acetaminophen (TYLENOL) tablet 650 mg, 650 mg, Oral, Q6H PRN, Vivian Dove MD, 650 mg at 08/05/19 2359    aluminum-magnesium hydroxide-simethicone (MYLANTA) 200-200-20 mg/5 mL oral suspension 30 mL, 30 mL, Oral, Q4H PRN, Vivian Dove MD, 30 mL at 08/04/19 1913    AMILoride tablet 5 mg, 5 mg, Oral, Daily, [facility], 5 mg at 08/06/19 0900   amLODIPine (NORVASC) tablet 10 mg, 10 mg, Oral, Daily, Hilary Alex MD, 10 mg at 08/06/19 0858    aspirin (ECOTRIN LOW STRENGTH) EC tablet 81 mg, 81 mg, Oral, Daily, Ember Ibarra MD, 81 mg at 08/06/19 0858    atorvastatin (LIPITOR) tablet 80 mg, 80 mg, Oral, Daily With Stephanie Lopez MD, 80 mg at 08/05/19 1639    cefTRIAXone (ROCEPHIN) IVPB (premix) 1,000 mg, 1,000 mg, Intravenous, Q24H, Ember Ibarra MD, Last Rate: 100 mL/hr at 08/05/19 2138, 1,000 mg at 08/05/19 2138    clopidogrel (PLAVIX) tablet 75 mg, 75 mg, Oral, Daily, Ember Ibarra MD, 75 mg at 08/06/19 0858    docusate sodium (COLACE) capsule 100 mg, 100 mg, Oral, BID, Ember Ibarra MD, 100 mg at 08/06/19 0858    enoxaparin (LOVENOX) subcutaneous injection 40 mg, 40 mg, Subcutaneous, Daily, Ember Ibarra MD, 40 mg at 08/06/19 0858    hydrALAZINE (APRESOLINE) injection 10 mg, 10 mg, Intravenous, Q4H PRN, Ember Ibarra MD, 10 mg at 08/02/19 1520    hydrALAZINE (APRESOLINE) tablet 50 mg, 50 mg, Oral, Q8H [address], Hilary Alex MD, 50 mg at 08/05/19 2140    losartan (COZAAR) tablet 100 mg, 100 mg, Oral, Daily, Hilary Alex MD, 100 mg at 08/06/19 0858    nebivolol (BYSTOLIC) tablet 20 mg, 20 mg, Oral, HS, Ember Ibarra MD, 20 mg at 08/05/19 2142    ondansetron (ZOFRAN) injection 4 mg, 4 mg, Intravenous, Q6H PRN, SALVATORE Calderón, 4 mg at 08/04/19 2051    pantoprazole (PROTONIX) EC tablet 40 mg, 40 mg, Oral, Early Morning, Ember Ibarra MD, 40 mg at 08/06/19 0534    Laboratory Results:  Results from last 7 days   Lab Units 08/06/19  0539 08/05/19  0457 08/04/19  2215 08/04/19  0527 08/03/19  0450 08/02/19  0514 08/01/19  0530   WBC Thousand/uL 5 89 13 18* 16 53* 6 19 6 51 8 35 8 96   HEMOGLOBIN g/dL 14 2 14 6 15 8 15 9 15 6 17 2* 16 3   HEMATOCRIT % 43 9 44 7 46 8 48 5 47 0 52 1* 48 6   PLATELETS Thousands/uL 176 187 198 226 217 241 229   POTASSIUM mmol/L 3 9 3 9 3 8 3 7 3 4* 4 0 3 7   CHLORIDE mmol/L 105 104 102 106 105 103 103   CO2 mmol/L 23 23 23 23 22 29 26   BUN mg/dL 16 23 21 15 16 18 13   CREATININE mg/dL 1 48* 1 78* 1 74* 1 45* 1 44* 1 53* 1 43*   CALCIUM mg/dL 8 7 8 4 9 1 8 6 8 5 8 9 9 0   MAGNESIUM mg/dL  --  2 3  --   --   --   --  2 1   PHOSPHORUS mg/dL  --   --   --   --   --   --  3 5

## 2019-08-06 NOTE — PHYSICAL THERAPY NOTE
PT TREATMENT     08/06/19 5215   Pain Assessment   Pain Assessment No/denies pain   Restrictions/Precautions   Other Precautions Chair Alarm; Bed Alarm; Fall Risk  (R sided weakness)   General   Chart Reviewed Yes   Family/Caregiver Present Yes  (friend visiting)   Cognition   Arousal/Participation Cooperative   Subjective   Subjective patient reports possible DC to rehab today and feeling better   Transfers   Sit to Stand 3  Moderate assistance   Additional items Assist x 1;Verbal cues   Stand to Sit 3  Moderate assistance   Additional items Assist x 1;Verbal cues   Ambulation/Elevation   Gait Assistance 2  Maximal assist   Additional items Assist x 1;Verbal cues; Tactile cues   Assistive Device Francisco J-walker   Distance 15 feet with chair follow  assist required for weight shifting, advancing R LE with ace wrapped foot into dorsiflexion, and blocking of R knee for stability with weight bearing to prevent knee buckling   Exercises   Hamstring Stretch Sitting;5 reps;PROM; Right   Hip Flexion Sitting;15 reps;AAROM; Right  (alternating for reeducation)   Knee AROM Long Arc Quad Sitting;5 reps;AROM; Bilateral  (facilitation to initiate R quad function)   Heel Cord Stretch Sitting;5 reps;PROM; Right   Balance training  sitting and standing balance activity completed with weight shifting and trunk strengthening   Assessment   Assessment patient cooperative and motivated, demonstrated ability to ambulate this session with max assist and R foot ace wrapped into dorsiflexion   Patient will benefit from continued PT with progression as tolerated  Plan   Treatment/Interventions ADL retraining;Functional transfer training;LE strengthening/ROM; Therapeutic exercise; Endurance training;Patient/family training;Equipment eval/education; Bed mobility;Gait training; Compensatory technique education   PT Frequency Once a day   Recommendation   Recommendation Short-term skilled PT   Licensure   [place] License Number  [facility] [id number]

## 2019-08-06 NOTE — OCCUPATIONAL THERAPY NOTE
OT TREATMENT       08/06/19 1330   Restrictions/Precautions   Other Precautions Chair Alarm; Bed Alarm; Fall Risk  (right hemiparesis)   General   Family/Caregiver Present friend   Pain Assessment   Pain Assessment No/denies pain   ADL   Eating Assistance 5  Supervision/Setup   Eating Deficit Setup   Grooming Assistance 4  Minimal Assistance   Grooming Deficit Setup   UB Bathing Assistance 4  Minimal Assistance   UB Bathing Deficit Setup   LB Bathing Assistance 3  Moderate Assistance   LB Bathing Deficit Setup   UB Dressing Assistance 3  Moderate Assistance   UB Dressing Deficit Setup   LB Dressing Assistance 3  Moderate Assistance   LB Dressing Comments pt donned/doffed L sock seated with L 1 handed technique with supervision   Toileting Assistance  3  Moderate Assistance   Transfers   Sit to Stand 3  Moderate assistance   Additional items Assist x 1;Verbal cues  (R knee blocked )   Stand to Sit 3  Moderate assistance   Additional items Assist x 1;Verbal cues   ROM- Right Upper Extremities   R Shoulder PROM; Flexion; Horizontal ABduction   R Elbow PROM;Elbow flexion;Elbow extension   R Wrist PROM; Wrist extension;Wrist flexion   R Hand PROM; Thumb; Long finger; Index finger;Ring finger;Little finger   R Weight/Reps/Sets 10 times each seated in chair  Some active motion noted with horizontal abduction   Assessment   Assessment Patient is cooperative and pleasant  Motivated for OT session  Patient with minimal muscle contraction in RUE  Patient able to demonstrate 1 handed technique for LE dressing without verbal cues  Plan   Treatment Interventions ADL retraining;Functional transfer training;UE strengthening/ROM; Endurance training;Patient/family training;Equipment evaluation/education; Activityengagement; Compensatory technique education   Recommendation   OT Discharge Recommendation [address] [facility] License Number  Aimee Perea [facility] [address] OTR/L [id number]

## 2019-08-06 NOTE — PLAN OF CARE
Problem: OCCUPATIONAL THERAPY ADULT  Goal: Performs self-care activities at highest level of function for planned discharge setting  See evaluation for individualized goals  Outcome: Progressing  Note:   Limitation: Decreased ADL status, Decreased UE strength, Decreased Safe judgement during ADL, Decreased endurance, Decreased self-care trans, Decreased high-level ADLs, Decreased UE ROM, Decreased sensation, Decreased fine motor control(decreased balance and mobility )  Prognosis: Good  Assessment: Patient is cooperative and pleasant  Motivated for OT session  Patient with minimal muscle contraction in RUE  Patient able to demonstrate 1 handed technique for LE dressing without verbal cues        OT Discharge Recommendation: Short Term Rehab

## 2019-08-06 NOTE — DISCHARGE SUMMARY
Discharge- Dipika Anders 1955, 60 y o  male MRN: [id number]    Unit/Bed#: [address] [address] Encounter: [id number]    Primary Care Provider: Jose Cruz Bailey MD   Date and time admitted to hospital: 7/31/2019  1:10 PM        Sepsis [facility])  Assessment & Plan  Noted to have fever of 101 3 degree F with associated leukocytosis up to 16  Possible source includes urinary tract infection  Lactic acid within normal limit  Blood cultures- prelim negative at 24 hours  Chest x-ray without any acute abnormality  UA is suggestive for UTI, Urine cx prelim was too pending at discharge  X-ray of the abdomen with possible ileus  CT A/P showed no obstruction  Started on ceftriaxone, with resolution of fevers and leukocytosis  Discharged with Augmentin and follow up urology    Urinary retention  Assessment & Plan  Likely secondary to CVA and constipation  Reported good bowel movement on 08/03  Urinary retention protocol, able to void when encouraged  Continue to monitor PVR  Bowel regimen  Started on flomax  Urology consulted, patient will need follow up after discharge  Stage 3 chronic kidney disease ([facility] [address] )  Assessment & Plan  · Records reviewed from PMD is office, baseline creatinine 1 4-1 5  CKD due to HTN nephrosclerosis likely  · Creatinine 1 41 on presentation, increased to 1 71 after getting CTA with contrast  · Denied any urinary complaints but with intermittent urinary retention in setting of CVA  · Renal ultrasound showed no hydro, it did show possible cystitis  · Monitor intake output  · Hold nephrotoxins  · creatinine improved with IVF and treating urinary retention  · Nephrology following, will need continued follow up as OP    Abnormal finding on CT scan  Assessment & Plan  · Frothy soft tissue abnormality in the nondependent portion of the trachea with imaging features favoring aspiration     · Differential diagnosis could include tracheal polyp or other soft tissue lesion    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  · Follow-up chest x-ray without any acute abnormality,   · Aspiration precautions  · Patient passed dysphagia eval    Abnormal MRI  Assessment & Plan  · MRI showed acute CVA as well as a few white matter lesions perpendicular to the callosal septal interface raising the possibility of demyelinating disease  Other less likely etiologies include vasculitis, Lyme and migraines  · Neurology input appreciated, likely all changes related to chronic small vessel ischemic changes  · Follow up with Neurology after discharge    HLD (hyperlipidemia)  Assessment & Plan    Changed from simvastatin to Atorvastatin 80 mg daily    Type 2 MI  Assessment & Plan  Presented with elevated troponin in setting of acute CVA  EKG normal sinus rhythm, LVH strain pattern  Troponin 0 32-0 30  Cardiology input noted  Status post nuclear stress test on 08/03-no evidence of ischemia  Continue medical management  Cardiology following    Hypertensive emergency  Assessment & Plan  · In setting of acute CVA with progressive symptoms  Reports history of uncontrolled hypertension  · Initially admitted to ICU , on Cardene drip which was weaned off  · Cardiology and nephrology consulted consulted  · Resumed on Bystolic, losartan, hydralazine, amlodipine  · Started patient on amiloride and torsemide  · Renal duplex without any evidence of renal artery stenosis  · TSH wnl  · Underwent sleep screen with elevated AHI but unclear significance as patient's sleep was interrupted    Will need formal sleep study as outpatient  · BP improved with medication regimen changes  · After discharge will need repeat renin and aldosterone level in 6 weeks as patient was on spironolactone prior to admission  · Will need follow up with nephrology as OP  · At time of discharge plasma metanephrines, renin assay, renin/creatinine ratio and aldosterone were pending    * Lacunar infarct, acute ([facility] [address] )  Assessment & Plan  · Patient presented with right-sided weakness, symptoms actually started evening earlier but patient did not present at that time  · CT head and CTA of the head and neck without any acute abnormality or large vessel occlusion  · NIHSS on presentation at least 5  · Not a candidate for tPA due to out of window  · Initially was admitted to ICU due to hypertensive emergency requiring Cardene drip  · Patient reports history of long-term uncontrolled hypertension, which is the likely etiology of his stroke  Resistant hypertension concerning for secondary component  · MRI of the brain showed an Acute appearing lacunar infarct at the posterior left corona radiata  · 2D echo-EF 44-37%, grade 1 diastolic dysfunction, negative bubble study, PA pressure 35  · LDL -130  · Hemoglobin A1c is 5 8  · Neurology consulted  · Started on antiplatelet aspirin and Plavix for at least 90 days, atorvastatin 80 mg daily  · Blood pressure optimized as noted  · PT/OT/ST: no dysphagia  PT/OT recommended STR  · Patient will require follow up with neurology as outpt          Discharging Physician / Practitioner: Ella Baltazar MD  PCP: Davis Han MD  Admission Date: 7/31/2019  Discharge Date: 08/06/19    Reason for Admission: Extremity Weakness (right sided weakness since last night at 1730)        Resolved Problems  Date Reviewed: 8/1/2019    None          Consultations During Hospital Stay:  IP CONSULT TO NEUROLOGY  IP CONSULT TO CASE MANAGEMENT  IP CONSULT TO CARDIOLOGY  IP CONSULT TO NEPHROLOGY  IP CONSULT TO UROLOGY    Procedures Performed:     · Renal artery duplex:  No evidence of arterial occlusive disease  · Nuclear stress test:  Probably normal study  Myocardial perfusion normal at rest and with stress  EF low visually at 35%  · 2D echo:  No shunt  Severe concentric hypertrophy  Grade 1 diastolic dysfunction      Significant Findings / Test Results:     · TSH 1 244  · Possible metanephrines pending  · Renin direct assay pending  · Aldosterone pending  · Urine protein creatinine ratio 0 32:  Elevated    Results from last 7 days   Lab Units 08/06/19  0539 08/05/19  0457 08/04/19  2215   WBC Thousand/uL 5 89 13 18* 16 53*   HEMOGLOBIN g/dL 14 2 14 6 15 8   PLATELETS Thousands/uL 176 187 198     Results from last 7 days   Lab Units 08/06/19  0539 08/05/19  0457 08/04/19  2215  07/31/19  1324   SODIUM mmol/L 137 137 137   < > 140   POTASSIUM mmol/L 3 9 3 9 3 8   < > 3 7   CHLORIDE mmol/L 105 104 102   < > 103   CO2 mmol/L 23 23 23   < > 26   BUN mg/dL 16 23 21   < > 13   CREATININE mg/dL 1 48* 1 78* 1 74*   < > 1 41*   CALCIUM mg/dL 8 7 8 4 9 1   < > 8 9   TOTAL BILIRUBIN mg/dL 0 80 1 60*  --   --  0 80   ALK PHOS U/L 56 56  --   --  79   ALT U/L 43 35  --   --  50   AST U/L 36 23  --   --  29    < > = values in this interval not displayed  Results from last 7 days   Lab Units 07/31/19  1324   INR  1 09     Results from last 7 days   Lab Units 07/31/19  1557 07/31/19  1324   TROPONIN I ng/mL 0 30* 0 32*     Lab Results   Component Value Date/Time    HGBA1C 5 8 08/01/2019 05:30 AM     Results from last 7 days   Lab Units 07/31/19  1314   POC GLUCOSE mg/dl 91     Results from last 7 days   Lab Units 08/06/19  0539 08/05/19  0622 08/04/19  2215   LACTIC ACID mmol/L  --   --  1 3   PROCALCITONIN ng/ml 3 99* 6 05*  --      Blood Culture:   Lab Results   Component Value Date    BLOODCX No Growth at 24 hrs  08/04/2019    BLOODCX No Growth at 24 hrs  08/04/2019     Urine Culture:   Lab Results   Component Value Date    URINECX Culture too young- will reincubate 08/05/2019     Sputum Culture: No components found for: SPUTUMCX  Wound Culture: No results found for: WOUNDCULT     CT abdomen pelvis wo contrast   Final Result by Tejinder Funes MD (08/05 1059)      1  No acute findings  No evidence of bowel obstruction or ileus  2   Left ventral subcutaneous soft tissue emphysema likely reflects injection or other intervention in this region  Correlate clinically  Workstation performed: [id number]         US kidney and bladder   Final Result by Scarlett Winston MD (36/04 9993)      No hydronephrosis  Underdistended bladder with diffusely thickened wall likely due to underdistention or cystitis  Correlate with lab values  Hypoechoic lesion with minimally thickened internal echogenic septations or calcifications in the left kidney  The lesion meets criteria for Bosniak 2F  Follow-up with renal ultrasound in 6 months is recommended  Workstation performed: [id number]         XR abdomen 1 view kub   Final Result by Roxana Perry DO (08/05 0006)      Multiple mildly dilated loops of small bowel in the mid and lower abdomen, consider small bowel ileus versus obstruction depending on the clinical setting  Consider CT for further evaluation at the discretion of the referring caregiver  No discrete evidence of intraperitoneal free air  Workstation performed: [id number]         XR chest 1 view   Final Result by Roxana Perry DO (08/04 7601)      No acute cardiopulmonary disease is seen  Workstation performed: [id number]         VAS renal artery complete   Final Result by Ravinder Nunn MD (08/02 1615)      MRI brain wo contrast   Final Result by Laura Rodriguez MD (07/31 1709)      Acute appearing lacunar infarct at the posterior left corona radiata  Moderate periventricular and subcortical foci of white matter T2 hyperintensity which is nonspecific and most likely related to chronic small vessel ischemic changes  A few of these white matter lesions when perpendicular to the callosal septal    interface raising the possibility of demyelinating disease  Other less likely etiologies include vasculitis, Lyme and migraines  Old right basal ganglia lacunar infarct               I personally discussed this study with Daily Anderson on 7/31/2019 at 5:05 PM                Workstation performed: [id number]         X-ray chest 1 view portable   Final Result by Armin Smith MD (07/31 8803)      No acute cardiopulmonary disease  Workstation performed: [id number]         CTA stroke alert (head/neck)   Final Result by William Enriquez MD (07/31 1709)         1  No hemodynamically significant stenosis in the major arteries of the neck  2   No intracranial aneurysm or major intracranial arterial stenosis  3   Frothy soft tissue abnormality in the nondependent portion of the trachea with imaging features favoring aspiration  Differential diagnosis could include tracheal polyp or other soft tissue lesion  4   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  Findings were directly discussed with Dr Catalina Feldman on 7/31/2019 1:32 PM                      Workstation performed: [id number]         CT stroke alert brain   Final Result by William Enriquez MD (07/31 8617)      No acute intracranial hemorrhage, mass effect or edema  Microangiopathic changes  Chronic appearing right basal ganglia lacunar infarctions  Findings were directly discussed with Dr Catalina Feldman on 7/31/2019 1:32 PM       Workstation performed: [id number]                Incidental Findings:   · As noted     Test Results Pending at Discharge (will require follow up):   ·      Outpatient Tests Requested:  ·     Complications:  none    Reason for Admission:   Chief Complaint   Patient presents with    Extremity Weakness     right sided weakness since last night at [facility] Course:     Aneudy Kelley is a 60 y o  male patient with a PMH, dyslipidemia of uncontrolled HTN who originally presented to the hospital on 7/31/2019 due to left-sided weakness the day previously the patient refused to come to the ED, on worsening of his weakness he agreed to be evaluated  In the ED was a stroke alert with an NIH score of at least 5  His blood pressure was severely elevated    CT of the head did not show any active bleeding he was admitted to the ICU with hypertensive emergency on a Cardene drip  Please see above list of diagnoses and related plan for additional information  Condition at Discharge: stable       Discharge Day Visit / Exam:     Subjective:  Feels fine  Has no complaints  Wife at bedside  All questions answered  Has no objection to discharge  Vitals: Blood Pressure: 139/71 (08/06/19 1350)  Pulse: 61 (08/06/19 0714)  Temperature: 97 8 °F (36 6 °C) (08/06/19 0714)  Temp Source: Tympanic (08/06/19 0714)  Respirations: 20 (08/06/19 0714)  Height: 6' 1" (185 4 cm) (08/01/19 1222)  Weight - Scale: 94 8 kg (209 lb 1 6 oz) (08/06/19 0541)  SpO2: 95 % (08/06/19 0714)  Exam:   Physical Exam   Constitutional: He is oriented to person, place, and time  He appears well-developed  No distress  HENT:   Head: Normocephalic and atraumatic  Cardiovascular: Normal rate, regular rhythm and normal heart sounds  No murmur heard  Pulmonary/Chest: Effort normal and breath sounds normal  No respiratory distress  He has no wheezes  He has no rales  Clear but decreased   Abdominal: Soft  Bowel sounds are normal  He exhibits no distension (mild)  There is no tenderness  There is no rebound  Musculoskeletal: He exhibits no edema, tenderness or deformity  Neurological: He is alert and oriented to person, place, and time  Right UE/LE weakness   Skin: Skin is warm and dry  Psychiatric: He has a normal mood and affect  His behavior is normal    Nursing note and vitals reviewed  Discharge instructions/Information to patient and family:   See after visit summary for information provided to patient and family  Provisions for Follow-Up Care:  See after visit summary for information related to follow-up care and any pertinent home health orders  Disposition:     Acute Rehab at   Planned Readmission: no     Discharge Statement:  I spent >30 minutes discharging the patient   This time was spent on the day of discharge  I had direct contact with the patient on the day of discharge  Greater than 50% of the total time was spent examining patient, answering all patient questions, arranging and discussing plan of care with patient as well as directly providing post-discharge instructions  Additional time then spent on discharge activities  Discharge Medications:  See after visit summary for reconciled discharge medications provided to patient and family        ** Please Note: This note has been constructed using a voice recognition system **

## 2019-08-06 NOTE — PLAN OF CARE
Problem: Prexisting or High Potential for Compromised Skin Integrity  Goal: Skin integrity is maintained or improved  Description  INTERVENTIONS:  - Identify patients at risk for skin breakdown  - Assess and monitor skin integrity  - Assess and monitor nutrition and hydration status  - Monitor labs (i e  albumin)  - Assess for incontinence   - Turn and reposition patient  - Assist with mobility/ambulation  - Relieve pressure over bony prominences  - Avoid friction and shearing  - Provide appropriate hygiene as needed including keeping skin clean and dry  - Evaluate need for skin moisturizer/barrier cream  - Collaborate with interdisciplinary team (i e  Nutrition, Rehabilitation, etc )   - Patient/family teaching  Outcome: Progressing     Problem: NEUROSENSORY - ADULT  Goal: Achieves stable or improved neurological status  Description  INTERVENTIONS  - Monitor and report changes in neurological status  - Initiate measures to prevent increased intracranial pressure  - Maintain blood pressure and fluid volume within ordered parameters to optimize cerebral perfusion  - Monitor temperature, glucose, and sodium or any other associated labs   Initiate appropriate interventions as ordered  - Monitor for seizure activity   - Administer anti-seizure medications as ordered  Outcome: Progressing  Goal: Achieves maximal functionality and self care  Description  INTERVENTIONS  - Monitor swallowing and airway patency with patient fatigue and changes in neurological status  - Encourage and assist patient to increase activity and self care with guidance from rehab services  - Encourage visually impaired, hearing impaired and aphasic patients to use assistive/communication devices  Outcome: Progressing     Problem: MUSCULOSKELETAL - ADULT  Goal: Maintain or return mobility to safest level of function  Description  INTERVENTIONS:  - Assess patient's ability to carry out ADLs; assess patient's baseline for ADL function and identify physical deficits which impact ability to perform ADLs (bathing, care of mouth/teeth, toileting, grooming, dressing, etc )  - Assess/evaluate cause of self-care deficits   - Assess range of motion  - Assess patient's mobility; develop plan if impaired  - Assess patient's need for assistive devices and provide as appropriate  - Encourage maximum independence but intervene and supervise when necessary  - Involve family in performance of ADLs  - Assess for home care needs following discharge   - Request OT consult to assist with ADL evaluation and planning for discharge  - Provide patient education as appropriate  Outcome: Progressing  Goal: Maintain proper alignment of affected body part  Description  INTERVENTIONS:  - Support, maintain and protect limb and body alignment  - Provide pt/fam with appropriate education  Outcome: Progressing     Problem: CARDIOVASCULAR - ADULT  Goal: Maintains optimal cardiac output and hemodynamic stability  Description  INTERVENTIONS:  - Monitor I/O, vital signs and rhythm  - Monitor for S/S and trends of decreased cardiac output i e  bleeding, hypotension  - Administer and titrate ordered vasoactive medications to optimize hemodynamic stability  - Assess quality of pulses, skin color and temperature  - Assess for signs of decreased coronary artery perfusion - ex  Angina  - Instruct patient to report change in severity of symptoms  Outcome: Progressing     Problem: Potential for Falls  Goal: Patient will remain free of falls  Description  INTERVENTIONS:  - Assess patient frequently for physical needs  -  Identify cognitive and physical deficits and behaviors that affect risk of falls    -  Adger fall precautions as indicated by assessment   - Educate patient/family on patient safety including physical limitations  - Instruct patient to call for assistance with activity based on assessment  - Modify environment to reduce risk of injury  - Consider OT/PT consult to assist with strengthening/mobility  Outcome: Progressing     Problem: Neurological Deficit  Goal: Neurological status is stable or improving  Description  Interventions:  - Monitor and assess patient's level of consciousness, motor function, sensory function, and level of assistance needed for ADLs  - Monitor and report changes from baseline  Collaborate with interdisciplinary team to initiate plan and implement interventions as ordered  - Provide and maintain a safe environment  - Utilize seizure precautions  - Utilize fall precautions  - Utilize aspiration precautions  - Utilize bleeding precautions  Outcome: Progressing     Problem: Activity Intolerance/Impaired Mobility  Goal: Mobility/activity is maintained at optimum level for patient  Description  Interventions:  - Assess and monitor patient  barriers to mobility and need for assistive/adaptive devices  - Assess patient's emotional response to limitations  - Collaborate with interdisciplinary team and initiate plans and interventions as ordered  - Encourage independent activity per ability   - Maintain proper body alignment  - Perform active/passive rom as tolerated/ordered  - Plan activities to conserve energy   - Turn patient  Outcome: Progressing     Problem: Communication Impairment  Goal: Ability to express needs and understand communication  Description  Assess patient's communication skills and ability to understand information  Patient will demonstrate use of effective communication techniques, alternative methods of communication and understanding even if not able to speak  - Encourage communication and provide alternate methods of communication as needed  - Collaborate with case management/ for discharge needs  - Include patient/family/caregiver in decisions related to communication    Outcome: Progressing     Problem: Potential for Aspiration  Goal: Non-ventilated patient's risk of aspiration is minimized  Description  Assess and monitor vital signs, respiratory status, and labs (WBC)  Monitor for signs of aspiration (tachypnea, cough, rales, wheezing, cyanosis, fever)  - Assess and monitor patient's ability to swallow  - Place patient up in chair to eat if possible  - HOB up at 90 degrees to eat if unable to get patient up into chair   - Supervise patient during oral intake  - Instruct patient to take small bites  - Instruct patient to take small single sips when taking liquids  - Follow patient-specific strategies generated by speech pathologist   Outcome: Progressing     Problem: Nutrition/Hydration-ADULT  Goal: Nutrient/Hydration intake appropriate for improving, restoring or maintaining nutritional needs  Description  Monitor and assess patient's nutrition/hydration status for malnutrition (ex- brittle hair, bruises, dry skin, pale skin and conjunctiva, muscle wasting, smooth red tongue, and disorientation)  Collaborate with interdisciplinary team and initiate plan and interventions as ordered  Monitor patient's weight and dietary intake as ordered or per policy  Utilize nutrition screening tool and intervene per policy  Determine patient's food preferences and provide high-protein, high-caloric foods as appropriate       INTERVENTIONS:  - Monitor oral intake, urinary output, labs, and treatment plans  - Assess nutrition and hydration status and recommend course of action  - Evaluate amount of meals eaten  - Assist patient with eating if necessary   - Allow adequate time for meals  - Recommend/ encourage appropriate diets, oral nutritional supplements, and vitamin/mineral supplements  - Order, calculate, and assess calorie counts as needed  - Recommend, monitor, and adjust tube feedings and TPN/PPN based on assessed needs  - Assess need for intravenous fluids  - Provide specific nutrition/hydration education as appropriate  - Include patient/family/caregiver in decisions related to nutrition  Outcome: Progressing

## 2019-08-06 NOTE — PROGRESS NOTES
Progress Note - General Surgery       Patient: Karen Keene   : 1955 Sex: male   MRN: [id number]     CSN: [id number]  Unit/Bed#: [address] [place] [address]     SUBJECTIVE:   No complaints  Voiding/ fair stream      Objective   Vitals: /78 (BP Location: Left arm)   Pulse 61   Temp 97 8 °F (36 6 °C) (Tympanic)   Resp 20   Ht 6' 1" (1 854 m)   Wt 94 8 kg (209 lb 1 6 oz)   SpO2 95%   BMI 27 59 kg/m²     I/O last 24 hours: In: -   Out: 6161 [Urine:1195]      Physical Exam:   General Alert awake   Normocephalic atraumatic PERRLA  Lungs clear bilaterally  Cardiac normal S1 normal S2  Abdomen soft, flank pain  Extremities no edema      Lab Results: CBC:   Lab Results   Component Value Date    WBC 5 89 2019    HGB 14 2 2019    HCT 43 9 2019    MCV 95 2019     2019    MCH 30 7 2019    MCHC 32 3 2019    RDW 14 1 2019    MPV 12 1 2019    NRBC 0 2019     CMP:   Lab Results   Component Value Date     2019    CO2 23 2019    BUN 16 2019    CREATININE 1 48 (H) 2019    CALCIUM 8 7 2019    AST 36 2019    ALT 43 2019    ALKPHOS 56 2019    EGFR 58 2019     Urinalysis:   Lab Results   Component Value Date    COLORU Marla 2019    CLARITYU Clear 2019    SPECGRAV 1 015 2019    PHUR 5 5 2019    LEUKOCYTESUR Trace (A) 2019    NITRITE Positive (A) 2019    GLUCOSEU Negative 2019    KETONESU Negative 2019    BILIRUBINUR Negative 2019    BLOODU Negative 2019     Urine Culture: No results found for: URINECX  PSA: No results found for: PSA      Assessment/ Plan:  Slow stream  Residual urine  May not be on doxazocin  Start flomax   One Bran Carpio MD

## 2019-08-06 NOTE — SOCIAL WORK
Spoke to Dr Oc Verma late morning, stated pt was ok for d/c today  Left vm for Coleen Seo at [facility] at [phone], opt 5, ext [phone]  Returned call at approx 1PM, left vm  Called her back, stated the claim has been sent to have the dates changed, may be the same auth number  Stated she would call back with info  Did not receive a call back until 4:30PM   Coleen Seo states official Benigno Valenzuela info will be faxed to the fax number on 2S  Notified wife and pt that pt was approved  Called to Sp in admissions at [facility], are able to accept pt today  Provided report and fax number to nurse  Wife is planning on transporting, confirmed she is able to  No other d/c needs  Auth form to be faxed to Sp in admissions at [phone]

## 2019-08-08 LAB
ALDOST SERPL-MCNC: 5.6 NG/DL (ref 0–30)
BACTERIA UR CULT: ABNORMAL
BACTERIA UR CULT: ABNORMAL
METANEPH FREE SERPL-MCNC: <10 PG/ML (ref 0–62)
NORMETANEPHRINE SERPL-MCNC: 38 PG/ML (ref 0–145)
RENIN PLAS-CCNC: <0.167 NG/ML/HR (ref 0.17–5.38)

## 2019-08-08 NOTE — UTILIZATION REVIEW
Notification of Discharge  This is a Notification of Discharge from our facility [address]  Please be advised that this patient has been discharge from our facility  Below you will find the admission and discharge date and time including the patients disposition  PRESENTATION DATE: 7/31/2019  1:10 PM  OBS ADMISSION DATE:   IP ADMISSION DATE: 7/31/19 1531   DISCHARGE DATE: 8/6/2019  5:16 PM  DISPOSITION: Non [facility]   Admission Orders listed below:  Admission Orders (From admission, onward)     Ordered        07/31/19 1620  Inpatient Admission  Once         07/31/19 1531  Inpatient Admission  Once                   Please contact the UR Department if additional information is required to close this patient's authorization/case  [address]  Utilization Review Department  Phone: [phone]; Fax [phone]  [email or web address]  ATTENTION: Please call with any questions or concerns to [phone]  and carefully listen to the prompts so that you are directed to the right person  Send all requests for admission clinical reviews, approved or denied determinations and any other requests to fax [phone]   All voicemails are confidential

## 2019-08-09 LAB
BACTERIA BLD CULT: NORMAL
BACTERIA BLD CULT: NORMAL

## 2021-03-30 DIAGNOSIS — Z23 ENCOUNTER FOR IMMUNIZATION: ICD-10-CM

## 2023-02-10 ENCOUNTER — EVALUATION (OUTPATIENT)
Dept: PHYSICAL THERAPY | Facility: CLINIC | Age: 68
End: 2023-02-10

## 2023-02-10 DIAGNOSIS — M54.16 LUMBAR RADICULOPATHY: ICD-10-CM

## 2023-02-10 DIAGNOSIS — I63.9 CEREBROVASCULAR ACCIDENT (CVA), UNSPECIFIED MECHANISM (HCC): Primary | ICD-10-CM

## 2023-02-10 DIAGNOSIS — R53.1 WEAKNESS: ICD-10-CM

## 2023-02-10 NOTE — LETTER
February 10, 2023    [address]    Patient: Halle Perez   YOB: 1955   Date of Visit: 2/10/2023     Encounter Diagnosis     ICD-10-CM    1  Cerebrovascular accident (CVA), unspecified mechanism ([facility] [address] )  I63 9       2  Weakness  R53 1       3  Lumbar radiculopathy  M54 16           Dear Dr Ishan Albright: Thank you for your recent referral of Halle Perez  Please review the attached evaluation summary from Salvatore's recent visit  Please verify that you agree with the plan of care by signing the attached order  If you have any questions or concerns, please do not hesitate to call  I sincerely appreciate the opportunity to share in the care of one of your patients and hope to have another opportunity to work with you in the near future  Sincerely,    Bart Huerta, PT      Referring Provider:      I certify that I have read the below Plan of Care and certify the need for these services furnished under this plan of treatment while under my care  Mini Valle MD  [address] [place] [address]  Via Fax: [phone]                                                                             PT Evaluation         POC expires   5/10/23                   Visit/Unit Tracking  2/10                                                    Today's date: 2/10/2023  Patient name: Halle Perez  : 1955  MRN: [id number]  Referring provider: Yanick Valdez MD  Dx:   Encounter Diagnosis     ICD-10-CM    1  [facility]  I63 9       2  Weakness  R53 1             Assessment  Assessment details: Patient is a 74 y o  Male who presents to skilled outpatient PT with low back pain on right side secondary to CVA after affects  Notes pain started since getting new PLF brace which is used for drop foot   Upon inspection, patient current brace is not appropriate  for patient due to poor ability to control inversion and eversion  Pt has tendency to toe walk with current brace  Pt noted with use of old brace, that patient has not had low back pain  Back pain is most likely due to toe walking and uneven of hips when walking  Education provided to family that he needs a better brace which is hinged to allow DF due to reason for new brace but must have medial and lateral walls to ensure proper maggie neutral ankle alignment of foot during weight bearing  Pt does appear to have tone UE>LE on right with overall rating of LE of 1  Ankle mobility hard to assess due to tone/ankle mobility  MMT note weakness in Right hip flexors and extensors which can improve with exercises  Demonstration of long axial distraction to right hip to reduce low back discomfort  Further assessment of low back to be conducted at next session  Stressed most likely cause is current AFO  Patient will benefit from further testing for FGA, TUG, 6MWT  Patient will benefit from skilled PT for low back pain treatment, balance therapy and AFO assessment with overall goal to return to highest functional level possible with reduction in fall risk  Impairments: Abnormal coordination, Abnormal gait, Abnormal muscle tone, Abnormal or restricted ROM, Activity intolerance, Impaired balance, Impaired physical strength, Lacks appropriate HEP, Poor posture, Poor body mechanics, Pain with function, Weight-bearing intolerance, Abnormal movement and Abnormal muscle firing  Understanding of Dx/Px/POC: Good  Prognosis: Good      Patient verbalized understanding of POC  Please contact me if you have any questions or recommendations  Thank you for the referral and the opportunity to share in [address] [place] [address] [facility] care          Plan  Planned modality interventions: TENS and Thermotherapy: Hydrocollator Packs  Planned therapy interventions: Abdominal trunk stabilization, ADL training, Balance, Balance/WB training, Breathing training, Body mechanics training, Coordination, Functional ROM exercises, Gait training, HEP, Neuromuscular re-education, Patient education, Postural training, Strengthening, Stretching, Therapeutic activities, Therapeutic exercises, Therapeutic training, Transfer training and Activity modification  Frequency: 2-3x/wk  Plan of Care beginning date: 2/10/23  Plan of Care expiration date: 3 months - 5/10/2023  Treatment plan discussed with: Patient         Goals  Short Term Goals (4 weeks):    - Patient will complete TUG test  - patient will report reduction in low back pain by 50%   - Patient will complete FGA test  - Patient will complete 6 MWT  - Patient will be independent in basic HEP 2-3 weeks  - Patient will improve 5xSTS score by 2 3 seconds from 18 73 seconds to 15 seconds to promote improved LE functional strength needed for ADLs  - Patient will complete components of CB&M to promote agility necessary for sports related tasks  - patient will complete 10 MWT    Long Term Goals (12 weeks):  - Patient will be independent in a comprehensive home exercise program  - Patient will improve gait speed to 1 0 m/s with LRAD  - Patient will improve scoring 23/30 or higher indicating lower risk for falls with dynamic tasks  - Patient will be able to demonstrate HT in gait without veering  - Patient will improve 6 Minute Walk Test score to  800 feet to promote improved cardiovascular endurance  - Patient will report 50% reduction in near falls in order to improve safety with functional tasks and reduce his risk for falls  - Patient will report going on walks at least 3 days per week to promote independence and improved cardiovascular endurance  - Patient will be able to ascend/descend stairs reciprocally without UE assist to promote independence and safety with ADLs  - Patient will report 50% reduction in near falls when ambulating on uneven terrain  - Patient will report no low back pain radiating down leg/thigh       Cut off score    All date taken from APTA Neuro Section or Rehab Measures      Lal:  Erin et al , 2018  Nataliia Maya [address]: 2 7 pts    Mary Ann et al , 2011  Cut-off score: 45/56    Chronic CVA  < 44/56 high risk for falls (Erin et al , 2018)  < 47 5/56 slow walker status (Alexandre ramsey al , 2011) 5xSTS: Estella 2010  Nataliia [address]: 2 3 sec  Age Norms:  60-69: 11 1 sec  70-79: 12 6 sec  80-89: 14 8 sec    Leticia 2010, Chronic Stroke  Chronic CVA: 12 sec   TUG  Zandra , 2005  MDC: 2 9 sec    Cut off score:  >13 5 sec community dwelling adults  >32 2 frail elderly  <20 I for basic transfers  >30 dependent on transfers 10 Meter Walk Test:   Gay garcia , 2006  Small meaningful change: 0 06 m/s  Substantial meaningful change: 0 14 m/s  MCID: 0 16 m/s    < 0 4 m/s household ambulators  0 4 - 0 8 m/s limited community ambulators  > 0 8 m/s community ambulators   FGA: Jazmine et al , 2010  MCID: 4 2 pts  Geriatrics/community < 22/30 fall risk  Geriatrics/community < 20/30 unexplained falls DGI  MDC: vestibular - 4 pts  MDC: geriatric/community - 3 pts  Falls risk <19/24   6 Minute Walk Test  Rabia et al , 2006  MDC: 60 98 m (200 01 ft)    Geovanna Clarke, & Cut off, 2012  MCID: 34 4 m    Age Norms  60-69: M - 1876 ft   F - 1765 ft  70-79: M - 1729 ft   F - 1545 ft  80-89: M - 1368 ft   F - 1286 ft Modified Levy  0: No increase in tone  1: Catch and release or min resistance at end range  1+: Catch f/b min resistance throughout remainder (< half ROM)  2: Easily moved, but more marked tone throughout most ROM  3: Significant tone, PROM difficult  4: Rigid   MiniBest: Katerine et al , 2013  CVA < 17 5 fall risk Pass (Acute CVA)  MDC: 1 8 points (acute), 3 2 points (chronic)         Subjective    History of Present Illness  Mechanism of injury: Pt reports CVA 3 years ago, splits time between [place] and [place]  Notes getting a new brace PLF but notes low back has been bothering him more now due to walking  Notes walking on toes and is getting a shin splint like issue     [facility]  Assist level at home: spouse   WC usage: none   PLOF: SBQC     Pain  Current pain ratin/10  At best pain ratin/10  At worst pain ratin/10  Location: R   Aggravating factors: none     Social Support  Steps to enter house: none   Stairs in house: none   Lives in: [place]   Lives with: spouse     Employment status: retired   Hand dominance: left hand now     Treatments  Previous treatment: Nov in [place]  Current treatment: None   Diagnostic Testing: none       Objective       LE MMT  - R Hip Flexion: 3-/5   - L Hip Flexion: 4-/5  - R Hip Extension: 3-/5  - L Hip Extension: 4-/5  - R Hip Abduction: 4-/5  - L Hip abduction: 4-/5  - R Hip adduction: 4-/5  - L Hip adduction: 4-/5  - R Knee Extension: 4-/5  - L Knee Extension: 4-/5  - R Knee Flexion: 4-/5   - L Knee Flexion: 4-/5  - R Ankle DF: 2-/5   - L Ankle DF: 4/5  - R Ankle PF: 2-/5   - L Ankle PF: 4/5    Sensation  - Light touch: intact  - Deep pressure: intact     Coordination  - Dysdiadochokinesia: unable   - Alternating Toe Taps: decrease on right, limited active DF movement     Modified Levy  - R knee extension: 1 - catch and release or minimum resistance at end range  - L knee extension: 0 - no increase in tone  - R knee flexion: 1 - catch and release or minimum resistance at end range   - L knee flexion: 0 - no increase in tone  - R ankle DF: 3 - significant tone, PROM difficulty      - L ankle DF: 0 - no increase in tone  - R ankle inversion: 3 - significant tone, PROM difficulty     - L ankle inversion: 0 - no increase in tone  - R ankle eversion: 3 - significant tone, PROM difficulty     - L ankle eversion: 0 - no increase in tone    Clonus  - L: No  - R: No    Vision  - Glasses: No  - Abnormalities prior to CVA: No,     Neglect  - R sided: No  - L sided: No    Pusher's Syndrome  - R sided: No  - L sided: No      Outcome Measures Initial Eval  10        5xSTS 18 73 sec        TUG  sec        10 meter  m/s        ZAVALETA /56        FGA /30        DGI /24 MiniBEST /28        PASS /36        6MWT  ft        CB&M /96                      TA: long axial distraction sustained hold 60 seconds x 5 reps, education on STS with weight bearing on right LE with focus weight through heel         Precautions: Fall risk   Past Medical History:   Diagnosis Date   • Hyperlipidemia    • Hypertension

## 2023-02-10 NOTE — PROGRESS NOTES
PT Evaluation          POC expires   5/10/23                   Visit/Unit Tracking  2/10                                                     Today's date: 2/10/2023  Patient name: Jazmin Goetz  : 1955  MRN: [id number]  Referring provider: William Red MD  Dx:   Encounter Diagnosis     ICD-10-CM    1  Cerebrovascular accident (CVA), unspecified mechanism ([facility] [address] )  I63 9       2  Weakness  R53 1             Assessment  Assessment details: Patient is a 74 y o  Male who presents to skilled outpatient PT with low back pain on right side secondary to CVA after affects  Notes pain started since getting new PLF brace which is used for drop foot  Upon inspection, patient current brace is not appropriate  for patient due to poor ability to control inversion and eversion  Pt has tendency to toe walk with current brace  Pt noted with use of old brace, that patient has not had low back pain  Back pain is most likely due to toe walking and uneven of hips when walking  Education provided to family that he needs a better brace which is hinged to allow DF due to reason for new brace but must have medial and lateral walls to ensure proper maggie neutral ankle alignment of foot during weight bearing  Pt does appear to have tone UE>LE on right with overall rating of LE of 1  Ankle mobility hard to assess due to tone/ankle mobility  MMT note weakness in Right hip flexors and extensors which can improve with exercises  Demonstration of long axial distraction to right hip to reduce low back discomfort  Further assessment of low back to be conducted at next session  Stressed most likely cause is current AFO  Patient will benefit from further testing for FGA, TUG, 6MWT  Patient will benefit from skilled PT for low back pain treatment, balance therapy and AFO assessment with overall goal to return to highest functional level possible with reduction in fall risk  Impairments: Abnormal coordination, Abnormal gait, Abnormal muscle tone, Abnormal or restricted ROM, Activity intolerance, Impaired balance, Impaired physical strength, Lacks appropriate HEP, Poor posture, Poor body mechanics, Pain with function, Weight-bearing intolerance, Abnormal movement and Abnormal muscle firing  Understanding of Dx/Px/POC: Good  Prognosis: Good      Patient verbalized understanding of POC  Please contact me if you have any questions or recommendations  Thank you for the referral and the opportunity to share in [facility] Lynnette Burdick care          Plan  Planned modality interventions: TENS and Thermotherapy: Hydrocollator Packs  Planned therapy interventions: Abdominal trunk stabilization, ADL training, Balance, Balance/WB training, Breathing training, Body mechanics training, Coordination, Functional ROM exercises, Gait training, HEP, Neuromuscular re-education, Patient education, Postural training, Strengthening, Stretching, Therapeutic activities, Therapeutic exercises, Therapeutic training, Transfer training and Activity modification  Frequency: 2-3x/wk  Plan of Care beginning date: 2/10/23  Plan of Care expiration date: 3 months - 5/10/2023  Treatment plan discussed with: Patient         Goals  Short Term Goals (4 weeks):    - Patient will complete TUG test  - patient will report reduction in low back pain by 50%   - Patient will complete FGA test  - Patient will complete 6 MWT  - Patient will be independent in basic HEP 2-3 weeks  - Patient will improve 5xSTS score by 2 3 seconds from 18 73 seconds to 15 seconds to promote improved LE functional strength needed for ADLs  - Patient will complete components of CB&M to promote agility necessary for sports related tasks  - patient will complete 10 MWT    Long Term Goals (12 weeks):  - Patient will be independent in a comprehensive home exercise program  - Patient will improve gait speed to 1 0 m/s with LRAD  - Patient will improve scoring 23/30 or higher indicating lower risk for falls with dynamic tasks  - Patient will be able to demonstrate HT in gait without veering  - Patient will improve 6 Minute Walk Test score to  800 feet to promote improved cardiovascular endurance  - Patient will report 50% reduction in near falls in order to improve safety with functional tasks and reduce his risk for falls  - Patient will report going on walks at least 3 days per week to promote independence and improved cardiovascular endurance  - Patient will be able to ascend/descend stairs reciprocally without UE assist to promote independence and safety with ADLs  - Patient will report 50% reduction in near falls when ambulating on uneven terrain  - Patient will report no low back pain radiating down leg/thigh       Cut off score    All date taken from APTA Neuro Section or Rehab Measures      Lal:  Erin et al , 2018  Nataliia [address]: 2 7 pts    Mary Ann ramsey al , 2011  Cut-off score: 45/56    Chronic CVA  < 44/56 high risk for falls (Erin et al , 2018)  < 47 5/56 slow walker status (Alexandre ramsey al , 2011) 5xSTS: Estella 2010  Nataliia [address]: 2 3 sec  Age Norms:  60-69: 11 1 sec  70-79: 12 6 sec  80-89: 14 8 sec    Leticia 2010, Chronic Stroke  Chronic CVA: 12 sec   TUG  Zandra , 2005  MDC: 2 9 sec    Cut off score:  >13 5 sec community dwelling adults  >32 2 frail elderly  <20 I for basic transfers  >30 dependent on transfers 10 Meter Walk Test:   Unruly garcia , 2006  Small meaningful change: 0 06 m/s  Substantial meaningful change: 0 14 m/s  MCID: 0 16 m/s    < 0 4 m/s household ambulators  0 4 - 0 8 m/s limited community ambulators  > 0 8 m/s community ambulators   FGA: Jazmine et al , 2010  MCID: 4 2 pts  Geriatrics/community < 22/30 fall risk  Geriatrics/community < 20/30 unexplained falls DGI  MDC: vestibular - 4 pts  MDC: geriatric/community - 3 pts  Falls risk <19/24   6 Minute Walk Test  Unruly garcia , 2006  MDC: 60 98 m (200 01 ft)    Geovanna Crow, & Cut off, 2012  MCID: 34 4 m    Age Norms  61-76: M - 1876 ft   F - 1765 ft  70-79: M - 1729 ft   F - 1545 ft  80-89: M - 1368 ft   F - 1286 ft Modified Levy  0: No increase in tone  1: Catch and release or min resistance at end range  1+: Catch f/b min resistance throughout remainder (< half ROM)  2: Easily moved, but more marked tone throughout most ROM  3: Significant tone, PROM difficult  4: Rigid   MiniBest: Katerine et al , 2013  CVA < 17 5 fall risk Pass (Acute CVA)  MDC: 1 8 points (acute), 3 2 points (chronic)         Subjective    History of Present Illness  Mechanism of injury: Pt reports CVA 3 years ago, splits time between [place] and [place]  Notes getting a new brace PLF but notes low back has been bothering him more now due to walking  Notes walking on toes and is getting a shin splint like issue     Primary AD: [facility]  Assist level at home: spouse   WC usage: none   PLOF: SBQC     Pain  Current pain ratin/10  At best pain ratin/10  At worst pain ratin/10  Location: R   Aggravating factors: none     Social Support  Steps to enter house: none   Stairs in house: none   Lives in: [place]   Lives with: spouse     Employment status: retired   Hand dominance: left hand now     Treatments  Previous treatment: Nov in [place]  Current treatment: None   Diagnostic Testing: none       Objective       LE MMT  - R Hip Flexion: 3-/5   - L Hip Flexion: 4-/5  - R Hip Extension: 3-/5  - L Hip Extension: 4-/5  - R Hip Abduction: 4-/5  - L Hip abduction: 4-/5  - R Hip adduction: 4-/5  - L Hip adduction: 4-/5  - R Knee Extension: 4-/5  - L Knee Extension: 4-/5  - R Knee Flexion: 4-/5   - L Knee Flexion: 4-/5  - R Ankle DF: 2-/5   - L Ankle DF: 4/5  - R Ankle PF: 2-/5   - L Ankle PF: 4/5    Sensation  - Light touch: intact  - Deep pressure: intact     Coordination  - Dysdiadochokinesia: unable   - Alternating Toe Taps: decrease on right, limited active DF movement     Modified Levy  - R knee extension: 1 - catch and release or minimum resistance at end range  - L knee extension: 0 - no increase in tone  - R knee flexion: 1 - catch and release or minimum resistance at end range   - L knee flexion: 0 - no increase in tone  - R ankle DF: 3 - significant tone, PROM difficulty      - L ankle DF: 0 - no increase in tone  - R ankle inversion: 3 - significant tone, PROM difficulty     - L ankle inversion: 0 - no increase in tone  - R ankle eversion: 3 - significant tone, PROM difficulty     - L ankle eversion: 0 - no increase in tone    Clonus  - L: No  - R: No    Vision  - Glasses: No  - Abnormalities prior to CVA: No,     Neglect  - R sided: No  - L sided: No    Pusher's Syndrome  - R sided: No  - L sided: No      Outcome Measures Initial Eval  2/10        5xSTS 18 73 sec        TUG  sec        10 meter  m/s        ZAVALETA /56        FGA /30        DGI /24        MiniBEST /28        PASS /36        6MWT  ft        CB&M /96                      TA: long axial distraction sustained hold 60 seconds x 5 reps, education on STS with weight bearing on right LE with focus weight through heel         Precautions: Fall risk   Past Medical History:   Diagnosis Date   • Hyperlipidemia    • Hypertension

## 2023-02-14 ENCOUNTER — OFFICE VISIT (OUTPATIENT)
Dept: PHYSICAL THERAPY | Facility: CLINIC | Age: 68
End: 2023-02-14

## 2023-02-14 DIAGNOSIS — I63.9 CEREBROVASCULAR ACCIDENT (CVA), UNSPECIFIED MECHANISM (HCC): Primary | ICD-10-CM

## 2023-02-14 DIAGNOSIS — R53.1 WEAKNESS: ICD-10-CM

## 2023-02-14 DIAGNOSIS — M54.16 LUMBAR RADICULOPATHY: ICD-10-CM

## 2023-02-14 NOTE — PROGRESS NOTES
Daily Note     POC expires   5/10/23                   Visit/Unit Tracking  2/10                                              Today's date: 2023  Patient name: Nadia Mann  : 1955  MRN: [id number]  Referring provider: Elisa Mike MD  Dx:   Encounter Diagnosis     ICD-10-CM    1  Cerebrovascular accident (CVA), unspecified mechanism ([facility] [address] )  I63 9       2  Weakness  R53 1       3  Lumbar radiculopathy  M54 16                      Subjective: Patient reports his brace (old one will be coming tomorrow)  Objective: See treatment diary below    TE  - Nerve glides: 10 reps (manual assistance)  - Hamstring stretch R LE: 5 mins + DF stretch  - Nerve Glides with green stretching strap: 10 reps, 10 sec holds  - Hook-lying SA activation: 20 reps (VC for "tight pants")  - Trunk ROT in hook-lyin reps     Manuals  - Piriformis release + surrounding musculature(in S-L, unable to tolerate prone due to muscle cramp) w/ use of roller: 15 mins    TA  HEP  - Hamstring stretch in long sitting (w/ chair vs sitting in bed) : 5 mins every day  - Hook-lyin reps at night/morning  - Gait speed w/ quad cane @ end: 0 45 m/s  - Gait speed w/ quad cane @ end: 0 51 m/s      Assessment: Tolerated treatment well with focus on mobility and low back concerns  Patient's symptoms per primary PT are believed to have started from change in bracing leading a forefoot strike  Pain pattern consisted with sciatic nerve as well as hamstring and gastroc soleus complex, therefore the focus was on mobility and core strengthening  Patient's gait speed indicates HIGH risk for falls, however noticeable improvements when comparing initial walk versus post manuals  Patient would benefit from continued PT focused on mobility deficits due to gait deficits  Plan: Continue per plan of care            Outcome Measures Initial Eval  2/10 DN   23       5xSTS 18 73 sec        TUG  sec        10 meter  m/s 0 51 m/s w/ quad cane ZAVALETA /56        FGA /30        DGI /24        MiniBEST /28        PASS /36        6MWT  ft        CB&M /96

## 2023-02-16 ENCOUNTER — OFFICE VISIT (OUTPATIENT)
Dept: PHYSICAL THERAPY | Facility: CLINIC | Age: 68
End: 2023-02-16

## 2023-02-16 DIAGNOSIS — I63.9 CEREBROVASCULAR ACCIDENT (CVA), UNSPECIFIED MECHANISM (HCC): Primary | ICD-10-CM

## 2023-02-16 DIAGNOSIS — R53.1 WEAKNESS: ICD-10-CM

## 2023-02-16 DIAGNOSIS — M54.16 LUMBAR RADICULOPATHY: ICD-10-CM

## 2023-02-16 NOTE — PROGRESS NOTES
Daily Note     POC expires   5/10/23                   Visit/Unit Tracking  2/10                                              Today's date: 2023  Patient name: Mary Blair  : 1955  MRN: [id number]  Referring provider: Edward Rojas MD  Dx:   Encounter Diagnosis     ICD-10-CM    1  Cerebrovascular accident (CVA), unspecified mechanism ([facility] [address] )  I63 9       2  Weakness  R53 1       3  Lumbar radiculopathy  M54 16                      Subjective: Patient reports to PT session with his original brace donned  Objective: See treatment diary below    TE  - Nerve glides: 10 reps (manual assistance)  - Hamstring stretch R LE: 5 mins + DF stretch  - Hook-lying SA activation: 20 reps (VC for "tight pants")  - Supine piriformis stretch R side: 3 mins  - Trunk ROT in hook-lyin reps     Manuals  - TFL + ITB release surrounding: 15 mins     TA  HEP  - Hamstring stretch in long sitting (w/ chair vs sitting in bed): 5 mins every day  - Hook-lyin reps at night/morning      Assessment: Tolerated treatment well with focus on mobility and low back concerns  Patient demonstrated overall improved foot clearance and heel strike with original brace donned  Educated patient to return to regular wearing schedule with his original brace with regular skin inspections; he verbalized good understanding  Continues to respond well to hamstring stretching as well as thoracic mobility exercises  Hypertonicity noted particularly in (R) TFL and ITB  Patient would benefit from continued PT focused on mobility deficits due to gait deficits  Plan: Continue per plan of care            Outcome Measures Initial Eval  2/10 DN   23       5xSTS 18 73 sec        TUG  sec        10 meter  m/s 0 51 m/s w/ quad cane       ZAVALETA /56        FGA /30        DGI /24        MiniBEST /28        PASS /36        6MWT  ft        &M /96

## 2023-02-20 ENCOUNTER — OFFICE VISIT (OUTPATIENT)
Dept: PHYSICAL THERAPY | Facility: CLINIC | Age: 68
End: 2023-02-20

## 2023-02-20 DIAGNOSIS — R53.1 WEAKNESS: ICD-10-CM

## 2023-02-20 DIAGNOSIS — M54.16 LUMBAR RADICULOPATHY: ICD-10-CM

## 2023-02-20 DIAGNOSIS — I63.9 CEREBROVASCULAR ACCIDENT (CVA), UNSPECIFIED MECHANISM (HCC): Primary | ICD-10-CM

## 2023-02-20 NOTE — PROGRESS NOTES
Daily Note     POC expires   5/10/23                   Visit/Unit Tracking  2/10                                              Today's date: 2023  Patient name: Jitendra Dillon  : 1955  MRN: [id number]  Referring provider: Michelle Stewart MD  Dx:   Encounter Diagnosis     ICD-10-CM    1  Cerebrovascular accident (CVA), unspecified mechanism ([facility] [address] )  I63 9       2  Weakness  R53 1       3  Lumbar radiculopathy  M54 16                      Subjective: Patient reports to PT session with his original brace donned  He took a muscle relaxer last night, reports no back pain currently but tenderness in R leg  Objective: See treatment diary below    TE  - Nerve glides: 10 reps (manual assistance)  - Hamstring stretch R LE: 5 mins + DF stretch  - Hook-lying SA activation: 20 reps (VC for "tight pants"), 5 sec holds   - Supine piriformis stretch R side: 3 mins  - LTRs in hook-lyin reps    - Clamshells AROM 2 x 20 B/L  - Glute bridges 2 x 10     Manuals  - TFL + ITB release surroundin mins      Assessment: Tolerated treatment well with focus on mobility and low back concerns  Incorporated more proximal LE strengthening today with noted impairments in R external rotator and hip extensor strength per difficulty with R clamshells and glute bridges, respectively  Would continue to progress within patient tolerance  Decreased hypertonicity in (R) TFL/ITB today  Patient would benefit from continued PT focused on mobility deficits due to gait deficits  Plan: Continue per plan of care            Outcome Measures Initial Eval  2/10 DN   23       5xSTS 18 73 sec        TUG  sec        10 meter  m/s 0 51 m/s w/ quad cane       ZAVALETA /56        FGA /30        DGI /24        MiniBEST /28        PASS /36        6MWT  ft        CB&M /96

## 2023-02-21 ENCOUNTER — EVALUATION (OUTPATIENT)
Dept: OCCUPATIONAL THERAPY | Facility: CLINIC | Age: 68
End: 2023-02-21

## 2023-02-21 DIAGNOSIS — R53.1 WEAKNESS: ICD-10-CM

## 2023-02-21 DIAGNOSIS — M54.16 LUMBAR RADICULOPATHY: ICD-10-CM

## 2023-02-21 DIAGNOSIS — I63.9 CEREBROVASCULAR ACCIDENT (CVA), UNSPECIFIED MECHANISM (HCC): Primary | ICD-10-CM

## 2023-02-21 NOTE — PROGRESS NOTES
Today's Date: 2023  Patient Name: Tamara Partida  : 1955  MRN: [id number]  Referring Provider: No ref  provider found  Dx: Cerebrovascular accident (CVA), unspecified mechanism ([facility] [address] ) [I63 9]      SKILLED ANALYSIS:  Pt is a R handed male presenting to skilled outpatient occupational therapy services s/p CVA in 2019  Pt is demonstrating difficulty with UB dressing and is unable to fasten buttons and required increased time performing ADLs and IADLs  Pt is demonstrating R UE spacticity, UE coordination, limited ROM and strength which is affecting his ablilty to complete ADLs and IADLs  Pt is currently demonstrating the following deficits: UE strength, glenohumeral subluxation, scapular winging, spacticity and limited R UE ROM  Pt's deficits noted based upon the following assessements Manual Muscle Test, Modified Levy Scale, and the Fugl-Ritchie Assessment   Pt recommended to attend OP OT services 2x per week for the next 12 weeks  Findings and recomendations discussed with pt and his wife and are in agreement of POC  Next session plan to complete: MoCA, Bland Making Part A and B      PLAN OF CARE START: 23  PLAN OF CARE END: 23  FREQUENCY: 2x per week 12 weeks  PRECAUTIONS: Fall risk, standard, HTN      Subjective    Mechanism of Injury  Pt dx with CVA in 2019 affecting R (dominant) side of body  Occupational Profile  Pt lives with his wife in a [facility]  Pt spends time at his house in [place] and [facility] in [place]  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   Pt reports he is independent getting dressing except donning/doffing his jacket  Pt does not wear clothing with buttons or zippers  Pts wife reports she manages his medication and meal preps  Pt reports he does not use his R UE for eating  Pt reports one fall in the past six months at home in [place]  Pt reports he does not use his R UE for ADLs or IADLs  Pt reports his R UE shoulder is always in pain   Pt uses a cane to walk and has a leg brace he wears on his R LE  Pts wife reports he has a R wrist brace to wear when sleeping but does not wear it  PATIENT GOAL: "Work on my movement"    HISTORY OF PRESENT ILLNESS:     Pt is a 79 y o  male who was referred to Occupational Therapy s/p  Cerebrovascular accident (CVA), unspecified mechanism ([facility] [address] ) [I63 9]  PMH:   Past Medical History:   Diagnosis Date   • Hyperlipidemia    • Hypertension        Past Surgical Hx: No past surgical history on file       Pain Levels:      Restin/10 in R shoulder      Objective    Impairments Section:                 Range of Motion:  AROM:   RUE   -  shoulder flexion 30%  - elbow flexion 80%  -  elbow extension 90%  -   wrist flexion 100%  -  wrist extension 80%  LUE within normal limits     MMT:  R UE:    -shoulder abduction: 2/5  -  shoulder flexion: 2/5  - elbow flexion: 3/5  -  elbow extension: 3-/5  -   wrist flexion: 3+/5  -  wrist extension: 3/5    LUE within normal limits    L UE 5/5 in all pivots   -  shoulder flexion  - elbow flexion  -  elbow extension   -   wrist flexion  -  wrist extension    LUE within normal limits       FUGYL JUNIOR ASSESSMENT   Performed fugyl junior assessment of the upper extremity to measure pt's motor impairment with upper extremity scores:  UPPER EXTREMITY SEATED:   WRIST: 310   HAND:   COORDINATION/SPEED: 6  OVERALL SCORE: 23/66     (clinical change= 5 points, severe impairment <19, and mild impairment is >50)          Further Observations in UE Assessment    Subluxation: 3/4 of finger width in R glenohumeral joint    Mild scapular winging and elevation noted at rest    Spasticity Noted           MODIFIED YARELY SCALE:   Performed modified yarely scale to assess spasticity of R upper extremity:  Shoulder external rotators: subluxation  Shoulder internal rotators: Not assessed  Shoulder flexors: Not assessed  Elbow flexors: 2/4  Elbow extensors: 1/4  Wrist flexors: 1+/4  Wrist extensors 1/4  Wrist pronators 3/4  Wrist supinators 0/4  Finger flexors: 0/4  Finger extensors: 3/4   0: No increase in muscle tone  1: Slight increase in muscle tone, manifested by a catch and release or by minimal resistance at the end of the range of motion when the affected part(s) is moved in flexion or extension  1+: Slight increase in muscle tone, manifested by a catch, followed by minimal resistance throughout the remainder (less than half) of the ROM  2: More marked increase in muscle tone through most of the ROM, but affected part(s) easily moved  3: Considerable increase in muscle tone, passive movement difficult  4: Affected part(s) rigid in flexion or extension       GOALS:   Short Term Goals:  Pt will demo improved R shoulder strength in order to score a 2+/5 on the shoulder flexion portion of MMTto increase independence with IADLs  Pt will score lower then a 2/4 on the digit extensors portions of the Modified Levy Scale assessment to increase independence with IADLs  Pt will demo improved R UE function by scoring a 25/66 or high on the Fugl-Ritchie Assessment to increase independence with IADLs  Pt will demo good carryover of clinic and home tone reduction strategies for improved AROM to increase independence donning/doffing jacket  Pt will demo good carryover of of Home Exercise Program to improve functional progression towards goals in R UE  Long Term Goals: 8-12 weeks  Pt will demo improved R shoulder strength in order to score a 3-/5 on the shoulder flexion portion of MMTto increase independence with IADLs  Pt will score lower then a 1+/4 on the digit extensors portions of the Modified Levy Scale assessment to increase independence with IADLs  Pt will demo improved R UE function by scoring a 26/66 or high on the Fugl-Ritchie Assessment to increase independence with IADLs    Pt will demo good carryover of clinic and home tone reduction strategies for improved AROM to increase independence donning/doffing jacket    Pt will demo good carryover of of Home Exercise Program to improve functional progression towards goals in R UE         OTHER PLANNED THERAPY INTERVENTIONS:   Supine, seated, and in stance neuro re-ed  Tricep AG  NMES/FES  FMC/prehension  Timed Trials  Manual tx  Hand to target  Sensory re-ed  Seated functional reach: crossing midline  Supine place and hold  WBearing strategies   Closed chain activities  Open chain activities

## 2023-02-21 NOTE — LETTER
2023    Luis Armando Salinas [address]    Patient: Dequan Moya   YOB: 1955   Date of Visit: 2023     Encounter Diagnosis     ICD-10-CM    1  Cerebrovascular accident (CVA), unspecified mechanism ([facility] )  I63 9       2  Weakness  R53 1       3  Lumbar radiculopathy  M54 16           Dear Dr Kassidy Giordano: Thank you for your recent referral of Dequan Moya  Please review the attached evaluation summary from Salvatore's recent visit  Please verify that you agree with the plan of care by signing the attached order  If you have any questions or concerns, please do not hesitate to call  I sincerely appreciate the opportunity to share in the care of one of your patients and hope to have another opportunity to work with you in the near future  Sincerely,    Ike Gallagher OT      Referring Provider:     I certify that I have read the below Plan of Care and certify the need for these services furnished under this plan of treatment while under my care  Luis Armando Salinas MD  [address] [place] [address]  Via Fax: [phone]          Today's Date: 2023  Patient Name: Dequan Moya  : 1955  MRN: [id number]  Referring Provider: No ref  provider found  Dx: Cerebrovascular accident (CVA), unspecified mechanism ([facility] ) [I63 9]      SKILLED ANALYSIS:  Pt is a R handed male presenting to skilled outpatient occupational therapy services s/p CVA in 2019  Pt is demonstrating difficulty with UB dressing and is unable to fasten buttons and required increased time performing ADLs and IADLs  Pt is demonstrating R UE spacticity, UE coordination, limited ROM and strength which is affecting his ablilty to complete ADLs and IADLs  Pt is currently demonstrating the following deficits: UE strength, glenohumeral subluxation, scapular winging, spacticity and limited R UE ROM   Pt's deficits noted based upon the following assessements Manual Muscle Test, Modified Levy Scale, and the Fugl-Ritchie Assessment   Pt recommended to attend OP OT services 2x per week for the next 12 weeks  Findings and recomendations discussed with pt and his wife and are in agreement of POC  Next session plan to complete: Preeti [place] Making Part A and B      PLAN OF CARE START: 23  PLAN OF CARE END: 23  FREQUENCY: 2x per week 12 weeks  PRECAUTIONS: Fall risk, standard, HTN      Subjective    Mechanism of Injury  Pt dx with CVA in 2019 affecting R (dominant) side of body  Occupational Profile  Pt lives with his wife in a [facility]  Pt spends time at his house in [place] and [address] in [facility]  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   Pt reports he is independent getting dressing except donning/doffing his jacket  Pt does not wear clothing with buttons or zippers  Pts wife reports she manages his medication and meal preps  Pt reports he does not use his R UE for eating  Pt reports one fall in the past six months at home in [facility]  Pt reports he does not use his R UE for ADLs or IADLs  Pt reports his R UE shoulder is always in pain  Pt uses a cane to walk and has a leg brace he wears on his R LE  Pts wife reports he has a R wrist brace to wear when sleeping but does not wear it  PATIENT GOAL: "Work on my movement"    HISTORY OF PRESENT ILLNESS:     Pt is a 79 y o  male who was referred to Occupational Therapy s/p  Cerebrovascular accident (CVA), unspecified mechanism ([facility] [address] ) [I63 9]  PMH:   Past Medical History:   Diagnosis Date   • Hyperlipidemia    • Hypertension        Past Surgical Hx: No past surgical history on file       Pain Levels:      Restin/10 in R shoulder      Objective    Impairments Section:                 Range of Motion:  AROM:   RUE   -  shoulder flexion 30%  - elbow flexion 80%  -  elbow extension 90%  -   wrist flexion 100%  -  wrist extension 80%  LUE within normal limits     MMT:  R UE:    -shoulder abduction: 2/5  -  shoulder flexion: 2/5  - elbow flexion: 3/5  -  elbow extension: 3-/5  -   wrist flexion: 3+/5  -  wrist extension: 3/5    LUE within normal limits    L UE 5/5 in all pivots   -  shoulder flexion  - elbow flexion  -  elbow extension   -   wrist flexion  -  wrist extension    LUE within normal limits       FUGYL JUNIOR ASSESSMENT   Performed fugyl junior assessment of the upper extremity to measure pt's motor impairment with upper extremity scores:  UPPER EXTREMITY SEATED: 11/36  WRIST: 3/10   HAND: 8/14  COORDINATION/SPEED: 1/6  OVERALL SCORE: 23/66     (clinical change= 5 points, severe impairment <19, and mild impairment is >50)          Further Observations in UE Assessment    Subluxation: 3/4 of finger width in R glenohumeral joint    Mild scapular winging and elevation noted at rest    Spasticity Noted           MODIFIED YARELY SCALE:   Performed modified yarely scale to assess spasticity of R upper extremity:  Shoulder external rotators: subluxation  Shoulder internal rotators: Not assessed  Shoulder flexors: Not assessed  Elbow flexors: 2/4  Elbow extensors: 1/4  Wrist flexors: 1+/4  Wrist extensors 1/4  Wrist pronators 3/4  Wrist supinators 0/4  Finger flexors: 0/4  Finger extensors: 3/4   0: No increase in muscle tone  1: Slight increase in muscle tone, manifested by a catch and release or by minimal resistance at the end of the range of motion when the affected part(s) is moved in flexion or extension  1+: Slight increase in muscle tone, manifested by a catch, followed by minimal resistance throughout the remainder (less than half) of the ROM  2: More marked increase in muscle tone through most of the ROM, but affected part(s) easily moved  3: Considerable increase in muscle tone, passive movement difficult  4: Affected part(s) rigid in flexion or extension       GOALS:   Short Term Goals:  Pt will demo improved R shoulder strength in order to score a 2+/5 on the shoulder flexion portion of MMTto increase independence with IADLs    Pt will score lower then a 2/4 on the digit extensors portions of the Modified Levy Scale assessment to increase independence with IADLs  Pt will demo improved R UE function by scoring a 25/66 or high on the Fugl-Ritchie Assessment to increase independence with IADLs  Pt will demo good carryover of clinic and home tone reduction strategies for improved AROM to increase independence donning/doffing jacket  Pt will demo good carryover of of Home Exercise Program to improve functional progression towards goals in R UE  Long Term Goals: 8-12 weeks  Pt will demo improved R shoulder strength in order to score a 3-/5 on the shoulder flexion portion of MMTto increase independence with IADLs  Pt will score lower then a 1+/4 on the digit extensors portions of the Modified Levy Scale assessment to increase independence with IADLs  Pt will demo improved R UE function by scoring a 26/66 or high on the Fugl-Ritchie Assessment to increase independence with IADLs  Pt will demo good carryover of clinic and home tone reduction strategies for improved AROM to increase independence donning/doffing jacket  Pt will demo good carryover of of Home Exercise Program to improve functional progression towards goals in R UE         OTHER PLANNED THERAPY INTERVENTIONS:   Supine, seated, and in stance neuro re-ed  Tricep AG  NMES/FES  FMC/prehension  Timed Trials  Manual tx  Hand to target  Sensory re-ed  Seated functional reach: crossing midline  Supine place and hold  WBearing strategies   Closed chain activities  Open chain activities    Attestation signed by Christiano Gautam OT at 2/22/2023 11:36 AM:  I supervised the visit  We discussed the case to ensure appropriate continuation and progression of care and I reviewed the documentation

## 2023-02-22 ENCOUNTER — APPOINTMENT (OUTPATIENT)
Dept: PHYSICAL THERAPY | Facility: CLINIC | Age: 68
End: 2023-02-22

## 2023-02-23 ENCOUNTER — OFFICE VISIT (OUTPATIENT)
Dept: PHYSICAL THERAPY | Facility: CLINIC | Age: 68
End: 2023-02-23

## 2023-02-23 DIAGNOSIS — I63.9 CEREBROVASCULAR ACCIDENT (CVA), UNSPECIFIED MECHANISM (HCC): ICD-10-CM

## 2023-02-23 DIAGNOSIS — R53.1 WEAKNESS: Primary | ICD-10-CM

## 2023-02-23 NOTE — PROGRESS NOTES
Daily Note     POC expires   5/10/23                   Visit/Unit Tracking  2/10                                              Today's date: 2023  Patient name: Jl Tavares  : 1955  MRN: [id number]  Referring provider: Brando Farias MD  Dx:   Encounter Diagnosis     ICD-10-CM    1  Weakness  R53 1       2  Cerebrovascular accident (CVA), unspecified mechanism ([facility] [address] )  I63 9                      Subjective: Patient reports to PT session with his original brace donned, reports no pain prior to treatment       Objective: See treatment diary below    TE  - Nerve glides: 10 reps (manual assistance)  - Hamstring stretch R LE: 5 mins + DF stretch  - Hook-lying SA activation: 20 reps (VC for "tight pants"), 5 sec holds   - Supine piriformis stretch R side: 3 mins  - LTRs in hook-lyin reps    - Clamshells AROM 2 x 20 B/L  - Glute bridges 2 x 10   - Prone hip extension: 2x    Manuals:   - TPR: R QL, R piriformis      Assessment: Patient tolerated treatment well  Attempted to challenge patient with hip extensions, however patient unable to complete without increased lower back pain  Provided patient with TPR to reduce overall pain level  Manual blocking used to improve patient overall exercise form with SL clamshells, plan to add TB next session to increase resistance  Patient would benefit from continued PT focused on mobility deficits due to gait deficits  Plan: Continue per plan of care            Outcome Measures Initial Eval  2/10 DN   23       5xSTS 18 73 sec        TUG  sec        10 meter  m/s 0 51 m/s w/ quad cane       ZAVALETA /56        FGA /30        DGI /24        MiniBEST /28        PASS /36        6MWT  ft        CB&M /96

## 2023-02-28 ENCOUNTER — OFFICE VISIT (OUTPATIENT)
Dept: OCCUPATIONAL THERAPY | Facility: CLINIC | Age: 68
End: 2023-02-28

## 2023-02-28 ENCOUNTER — OFFICE VISIT (OUTPATIENT)
Dept: PHYSICAL THERAPY | Facility: CLINIC | Age: 68
End: 2023-02-28

## 2023-02-28 DIAGNOSIS — R53.1 WEAKNESS: ICD-10-CM

## 2023-02-28 DIAGNOSIS — I63.9 CEREBROVASCULAR ACCIDENT (CVA), UNSPECIFIED MECHANISM (HCC): Primary | ICD-10-CM

## 2023-02-28 DIAGNOSIS — R53.1 WEAKNESS: Primary | ICD-10-CM

## 2023-02-28 DIAGNOSIS — M54.16 LUMBAR RADICULOPATHY: ICD-10-CM

## 2023-02-28 DIAGNOSIS — I63.9 CEREBROVASCULAR ACCIDENT (CVA), UNSPECIFIED MECHANISM (HCC): ICD-10-CM

## 2023-02-28 NOTE — PROGRESS NOTES
Daily Note     Today's date: 2023  Patient name: Ayla Coppola  : 1955  MRN: [id number]  Referring provider: Katherine Jones MD  Dx:   Encounter Diagnosis     ICD-10-CM    1  Cerebrovascular accident (CVA), unspecified mechanism ([facility] )  I63 9       2  Weakness  R53 1       3  Lumbar radiculopathy  M54 16           Start Time: 1800  Stop Time: 1845  Total time in clinic (min): 45 minutes    Subjective: Pt reports no changes since last session  Objective: See treatment diary below      [facility] Cognitive Assessment Version 8 2 (MoCA V8 2)  Visuospatial/executive functionin  Naming:  3/3  Memory: 1st trial:  , 2nd trial:    Attention/concentration: /  List of letters:   Serial Seven Subtraction:  0/3 w/ 0 errors  Language/sentence repetition:  1/2  Language Fluency:    Abstract/Correlational Thinkin/2  Delayed Recall:    Orientation:     Memory Index Score: 13/15  MoCA V1 8 2 Raw Score:  19/30, MIS:  13/15, indicative of mild neurocognitive impairments  MOCA Scoring        Normal: 26+        Mild Cognitive Impairment: 18-25         Moderate Cognitive Impairment: 10-17        Severe Cognitive Impairment: <10      Trail making Part A and Part B:   Part A: 50 4 seconds  Part B: 1 minute 18 seconds with 1 VCs for recall of instructions  Indicating deficits: Part A deficit > 39 14 seconds and Part B deficit > 91 32 seconds      CMT:  Immediate Recall:   Delayed Recall:     NMR:  Shoulder elevation with 5 sec iso holds x25  Scapular retraction with 5 sec iso holds 20x      Assessment: Tolerated treatment well  Pt would benefit from continued OT with focus on [facility], dexterity, functional cognition, sustained attention, divided attention and R UE strength  Plan: Continue POC  Pt educated on HEP  Shoulder elevation and scapular retraction exercises with 5 second isometric holds      Goals added 23  -Pt will demo improved sustained attention by completing Trail Making Part A in under 43 seconds   -Pt will demo improved divided attention by completing Trail Making Part B in under 1 minute with 0 VC for direction following  Pt will demo improved functional cognition by scoring over a 21/30 on the MoCA

## 2023-02-28 NOTE — PROGRESS NOTES
Daily Note     POC expires   5/10/23                   Visit/Unit Tracking  2/10                                              Today's date: 2023  Patient name: Gloria Carranza  : 1955  MRN: [id number]  Referring provider: Joana Pearl MD  Dx:   Encounter Diagnosis     ICD-10-CM    1  Weakness  R53 1       2  Cerebrovascular accident (CVA), unspecified mechanism ([facility] [address] )  I63 9       3  Lumbar radiculopathy  M54 16                      Subjective: Patient reports to PT session with his original brace donned, reports no pain prior to treatment       Objective: See treatment diary below    TE  - Nerve glides: 10 reps (manual assistance)  - Hamstring stretch R LE: 5 mins + DF stretch  - Hook-lying SA activation: 20 reps (VC for "tight pants"), 5 sec holds   - Supine piriformis stretch R side: 5 mins  - Glute bridges 2 x 10     Manuals:   - TPR: R QL, R piriformis    TA:   - AFO inspection, patient information      Assessment: Patient tolerated treatment well  TPR to R QL and piriformis to reduce patient pain level and improve overall mobility, with patient reporting reduced pain post treatment  Kizzy provided with bed mobility and positioning for stretches  Plan to reach out to orthotist to assist patient with obtaining new bracing for RLE  Patient would benefit from continued PT focused on mobility deficits due to gait deficits  Plan: Continue per plan of care            Outcome Measures Initial Eval  2/10 DN   23       5xSTS 18 73 sec        TUG  sec        10 meter  m/s 0 51 m/s w/ quad cane       ZAVALETA /56        FGA /30        DGI /24        MiniBEST /28        PASS /36        6MWT  ft        CB&M /96

## 2023-03-02 ENCOUNTER — OFFICE VISIT (OUTPATIENT)
Dept: PHYSICAL THERAPY | Facility: CLINIC | Age: 68
End: 2023-03-02

## 2023-03-02 ENCOUNTER — OFFICE VISIT (OUTPATIENT)
Dept: OCCUPATIONAL THERAPY | Facility: CLINIC | Age: 68
End: 2023-03-02

## 2023-03-02 DIAGNOSIS — M54.16 LUMBAR RADICULOPATHY: ICD-10-CM

## 2023-03-02 DIAGNOSIS — I63.9 CEREBROVASCULAR ACCIDENT (CVA), UNSPECIFIED MECHANISM (HCC): Primary | ICD-10-CM

## 2023-03-02 DIAGNOSIS — R53.1 WEAKNESS: ICD-10-CM

## 2023-03-02 DIAGNOSIS — I63.9 CEREBROVASCULAR ACCIDENT (CVA), UNSPECIFIED MECHANISM (HCC): ICD-10-CM

## 2023-03-02 DIAGNOSIS — R53.1 WEAKNESS: Primary | ICD-10-CM

## 2023-03-02 NOTE — PROGRESS NOTES
Daily Note     Today's date: 3/2/2023  Patient name: Araceli Diaz  : 1955  MRN: [id number]  Referring provider: Juliet Roach MD  Dx:   Encounter Diagnosis     ICD-10-CM    1  Cerebrovascular accident (CVA), unspecified mechanism ([facility] )  I63 9       2  Weakness  R53 1       3  Lumbar radiculopathy  M54 16           Start Time: 0930  Stop Time: 1015  Total time in clinic (min): 45 minutes    Subjective: Pts wife asked questions about hand splint and insurance  Presents wearing wrist brace that wife purchased off the rack, no finger involvement  Objective: See treatment diary below  NMRE:  -Pt performed seal stretch with FES to R triceps with focus on elbow extension, UE weight bearing, UE strength, b/l coordination, motor planning and control  Pt performed 20 reps with 5 seconds isometric holds  Pt performed exercises with increased inc time for breaks between reps  -Shoulder flexion in supine to prevent compensatory scapular winging and promote normal UE positioning during reaching with 2lb yellow therbar and FES to R anterior shoulder with focus on b/l coordination, motor planning, and control, elbow extension and UE strength  Pt completed 20 reps of exercise with 1 VC to perform exercises slower and 2 VC to extend R elbow     -Lateral push up performed sitting on johnny table with focus on elbow extension, R UE weight bearing, motor planning and control and R UE coordination  Pt performed 10 reps of exercise with increased inc time      -Pt educated on HEP and performed 3 reps of each exercise  Focus on shoulder flexion, shoulder elevation with 5 sec iso holds, scap retraction with 5 sec iso holds, lateral forearm pushups with stabilizing R hand with L UE, elbow flexion, mass finger flexion/extension  Assessment: Tolerated treatment well  Patient would benefit from continued OT services with focus on R UE strengthening, dexterity, motor planning and control, and [address]    NEXT SESSION, PLEASE PROVIDE PRINT OUT FOR RESTING HAND SPLINT      Plan: Continue POC and home exercise perform     HEP: (give daily checklists for pt to complete every week to inc compliance)  -Shoulder flexion in supine   -Shoulder elevation with 5 sec iso holds (in front of mirror)  -Scap retraction with 5 sec iso holds  -Lateral forearm pushups with stabilizing R hand with L UE  -Elbow flexion  -Mass finger flexion/extension with AAROM PRN

## 2023-03-02 NOTE — PROGRESS NOTES
Daily Note     POC expires   5/10/23                   Visit/Unit Tracking  2/10                                              Today's date: 3/2/2023  Patient name: Bang Del Rosario  : 1955  MRN: [id number]  Referring provider: Angel Dickson MD  Dx:   Encounter Diagnosis     ICD-10-CM    1  Weakness  R53 1       2  Cerebrovascular accident (CVA), unspecified mechanism ([address] )  I63 9       3  Lumbar radiculopathy  M54 16                      Subjective: Patient reports to PT session with his original brace donned, reports no pain prior to treatment       Objective: See treatment diary below    NMR:   -STS 2x10 stagger feet to increased weight bearing right side with foam pad on chair   - step taps L LE only 20 reps with manual blocking of knee at times to prevent knee flexion   - Step ups and down 10 reps with focus on hip flexion with ascent and descent   - Amb with focus on knee flexion with HHA  250 feet cuing for increased stride length  - fwd step over 6 inch hurdles 2 x 10 reps 1 UE support     TA:   - AFO inspection, patient information      Assessment: Patient tolerated treatment well  Noted no pian in hip region and agreeable to strengthening and balance program  Challenged with active weight bearing on right side with maintaining knee extension  VCs for active knee extension with stance phase of gait with HHA  Min to Mod A for sit to stand  Plan to reach out to orthotist to assist patient with obtaining new bracing for RLE  Patient would benefit from continued PT focused on mobility deficits due to gait deficits  Plan: Continue per plan of care            Outcome Measures Initial Eval  2/10 DN   23       5xSTS 18 73 sec        TUG  sec        10 meter  m/s 0 51 m/s w/ quad cane       ZAVALETA /56        FGA /30        DGI /24        MiniBEST /28        PASS /36        6MWT  ft        CB&M /96

## 2023-03-07 ENCOUNTER — OFFICE VISIT (OUTPATIENT)
Dept: PHYSICAL THERAPY | Facility: CLINIC | Age: 68
End: 2023-03-07

## 2023-03-07 ENCOUNTER — OFFICE VISIT (OUTPATIENT)
Dept: OCCUPATIONAL THERAPY | Facility: CLINIC | Age: 68
End: 2023-03-07

## 2023-03-07 DIAGNOSIS — I63.9 CEREBROVASCULAR ACCIDENT (CVA), UNSPECIFIED MECHANISM (HCC): Primary | ICD-10-CM

## 2023-03-07 DIAGNOSIS — R53.1 WEAKNESS: ICD-10-CM

## 2023-03-07 DIAGNOSIS — M54.16 LUMBAR RADICULOPATHY: ICD-10-CM

## 2023-03-07 DIAGNOSIS — I63.9 CEREBROVASCULAR ACCIDENT (CVA), UNSPECIFIED MECHANISM (HCC): ICD-10-CM

## 2023-03-07 DIAGNOSIS — R53.1 WEAKNESS: Primary | ICD-10-CM

## 2023-03-07 NOTE — PROGRESS NOTES
Daily Note     POC expires   5/10/23                   Visit/Unit Tracking  2/10                                              Today's date: 3/7/2023  Patient name: Nita Patel  : 1955  MRN: [id number]  Referring provider: Sukhdeep Calloway MD  Dx:   Encounter Diagnosis     ICD-10-CM    1  Weakness  R53 1       2  Cerebrovascular accident (CVA), unspecified mechanism ([facility] [address] )  I63 9       3  Lumbar radiculopathy  M54 16                      Subjective: Patient reports to PT session with his original brace donned, reports no pain prior to treatment       Objective: See treatment diary below    NMR:   - STS 2x10 stagger feet to increased weight bearing right side with foam pad on chair   - step taps L LE only 20 reps with manual blocking of knee at times to prevent knee flexion   - Step ups and down 10 reps with focus on hip flexion with ascent and descent   - Amb with focus on knee flexion with HHA  250 feet cuing for increased stride length  - FWD ambulation w/ TB resistance on RLE: 150 ft x RTB        Assessment: Patient tolerated treatment well  Due to overall reduced pain reported pre-treatment, continued with dynamic balance exercises  Manual blocking of R knee provided during step taps to prevent knee buckling with increased WBing  Verbal cueing to increase hip flexion provided during ambulation and increased when TB resistance added  Provided patient with letter for is PCP to request new prescription for appropriately fitting AFO  Patient would benefit from continued PT focused on mobility deficits due to gait deficits  Plan: Continue per plan of care            Outcome Measures Initial Eval  2/10 DN   23       5xSTS 18 73 sec        TUG  sec        10 meter  m/s 0 51 m/s w/ quad cane       ZAVALETA /56        FGA /30        DGI /24        MiniBEST /28        PASS /36        6MWT  ft        CB&M /96

## 2023-03-07 NOTE — PROGRESS NOTES
Daily Note     Today's date: 3/7/2023  Patient name: Linda Mcdowell  : 1955  MRN: [id number]  Referring provider: Nicholas Mooney MD  Dx:   Encounter Diagnosis     ICD-10-CM    1  Cerebrovascular accident (CVA), unspecified mechanism ([facility] [address] )  I63 9       2  Weakness  R53 1       3  Lumbar radiculopathy  M54 16           Start Time: 1415  Stop Time: 1500  Total time in clinic (min): 45 minutes    Subjective: Pt asked about a resting hand splint  Objective: See treatment diary below  NMRE:  -Shoulder flexion with FES to R anterior shoulder and pt standing against wall to prevent compensatory scapular winging and promote normal UE positioning during reaching with 2lb yellow therbar with focus on b/l coordination, motor planning, and control, elbow extension and UE strength  Pt stood in front of mirror for external feedback  Pt completed 20 reps of exercise with 1 break  -Bicep curls with focus on elbow flexion and extension with 2lb dumb bell and FES to anterior bicep  Pt performed 10 reps of exercise sitting in front of mirror for external feedback     -Pt performed peg transfer from anterior table top surface to R lateral side on blue johnny table with FES to wrist/digit extensors with focus on shoulder external rotation, grasp and release of items in hand, elbow extension, motor planning and control and coordination  Pt transferred 10 pegs and dropped 3 pegs when transferring from table top surface to blue johnny table  Benefits from VCs and modeling appropriate motor patterns to promote inc wrist and digit extension in preparation for effective grasp/release     -Cone flipping task with focus on pronation and supination of R UE with FES to anterior bicep with focus on motor planning, coordination, control, grasp and release of items in hand, digit extension, FMC and dexterity   Downgraded to [facility] with assist from OTS for supination    -Golf ball to cone transfer on anterior table top with focus on motor planning and control, coordination, [address], dexterity, grasp and release of items in hands, digit extension, kinesthesia and proprioception  Pt transferred 8 golf balls onto cones located on table anterior to pt  Pt dropped 3 golf balls and knocked over 4 balls and cones  Assessment: Tolerated treatment well  Patient would benefit from continued OT services with focus on R UE strengthening, dexterity, motor planning and control, and [address]  Plan: Continue POC and home exercise perform     HEP: (give daily checklists for pt to complete every week to inc compliance)  -Shoulder flexion in supine   -Shoulder elevation with 5 sec iso holds (in front of mirror)  -Scap retraction with 5 sec iso holds  -Lateral forearm pushups with stabilizing R hand with L UE  -Elbow flexion  -Mass finger flexion/extension with AAROM PRN

## 2023-03-09 ENCOUNTER — OFFICE VISIT (OUTPATIENT)
Dept: PHYSICAL THERAPY | Facility: CLINIC | Age: 68
End: 2023-03-09

## 2023-03-09 ENCOUNTER — OFFICE VISIT (OUTPATIENT)
Dept: OCCUPATIONAL THERAPY | Facility: CLINIC | Age: 68
End: 2023-03-09

## 2023-03-09 DIAGNOSIS — R53.1 WEAKNESS: Primary | ICD-10-CM

## 2023-03-09 DIAGNOSIS — I63.9 CEREBROVASCULAR ACCIDENT (CVA), UNSPECIFIED MECHANISM (HCC): Primary | ICD-10-CM

## 2023-03-09 DIAGNOSIS — R53.1 WEAKNESS: ICD-10-CM

## 2023-03-09 DIAGNOSIS — I63.9 CEREBROVASCULAR ACCIDENT (CVA), UNSPECIFIED MECHANISM (HCC): ICD-10-CM

## 2023-03-09 NOTE — PROGRESS NOTES
Daily Note     Today's date: 3/9/2023  Patient name: Bang Del Rosario  : 1955  MRN: [id number]  Referring provider: Angel Dickson MD  Dx:   Encounter Diagnosis     ICD-10-CM    1  Cerebrovascular accident (CVA), unspecified mechanism ([facility] [address] )  I63 9       2  Weakness  R53 1           Start Time: 3855  Stop Time: 9618  Total time in clinic (min): 68 minutes    Subjective: Pt reports they ordered a resting hand splint and are waiting for it to be delivered  Will bring it for OT to fit  Objective: See treatment diary below  NMRE:  -Shoulder flexion in supine with 2lb therabar and red theraband with focus on improving proximal control of R UE, improving motor planning R UE, anterior deltoid strengthening, and proprioception input R UE  20x  FES anterior and middle deltoids  -Shoulder abduction in supine with AAROM from therapist   Only able to achieve ~75* abduction with assist of therapist 2* significant adductor hypertonicity 20x  FES middle and posterior deltoids  -Chest press with 2lb therabar and rings placed in the middle to provide visual cues for R UE proprioception and kinesthesia  FES triceps  25x  -Large peg retrieval laterally on R with use of R UE to place into board anteriorly  Focus on increasing shoulder external rotation ROM, improving dexterity, improving FMC  Then completes retrieval of marbles from table ~45* to R and placement on top of pegs to inc focus on pincer grasp and [address]  Drops/knocks over ~10%  Initiates movement proximally and initiates lateral grasp>pincer      Assessment: Tolerated treatment well  Patient would benefit from continued OT services with focus on R UE strengthening, dexterity, motor planning and control, and [address]  Plan: Continue POC and home exercise perform     HEP: (give daily checklists for pt to complete every week to inc compliance)  -Shoulder flexion in supine   -Shoulder elevation with 5 sec iso holds (in front of mirror)  -Scap retraction with 5 sec iso holds  -Lateral forearm pushups with stabilizing R hand with L UE  -Elbow flexion  -Mass finger flexion/extension with AAROM PRN

## 2023-03-09 NOTE — PROGRESS NOTES
Daily Note     POC expires   5/10/23                   Visit/Unit Tracking  3/9                                              Today's date: 3/9/2023  Patient name: Gerry Weeks  : 1955  MRN: [id number]  Referring provider: Paige Hampton MD  Dx:   Encounter Diagnosis     ICD-10-CM    1  Weakness  R53 1       2  Cerebrovascular accident (CVA), unspecified mechanism ([address] )  I63 9                      Subjective: Patient reports to PT session with his original brace donned, reports no pain prior to treatment       Objective: See treatment diary below    NMR:   - STS 2x10 stagger feet to increased weight bearing right side with foam pad on chair   - step taps L LE only 20 reps with manual blocking of knee at times to prevent knee flexion   - Step ups and down 10 reps with focus on hip flexion with ascent and descent   - Amb with focus on knee flexion with HHA  250 feet cuing for increased stride length x 2  - Amb with focus on knee flexion with HHA  250 feet cuing for increased stride length with 20# ankle wt on right x 1  - FWD ambulation w/ TB resistance on RLE: 150 ft x RTB ( NOT TODAY)        Assessment: Patient tolerated treatment well  Manual blocking of R knee provided during step taps to prevent knee buckling with increased WBing  Verbal cueing to increase hip flexion provided during ambulation  Marked improvement in gait cycle with use of 20# ankle wt  Patient would benefit from continued PT focused on mobility deficits due to gait deficits  Plan: Continue per plan of care            Outcome Measures Initial Eval  2/10 DN   23       5xSTS 18 73 sec        TUG  sec        10 meter  m/s 0 51 m/s w/ quad cane       ZAVALETA /56        FGA /30        DGI /24        MiniBEST /28        PASS /36        6MWT  ft        CB&M /96

## 2023-03-14 ENCOUNTER — EVALUATION (OUTPATIENT)
Dept: PHYSICAL THERAPY | Facility: CLINIC | Age: 68
End: 2023-03-14

## 2023-03-14 ENCOUNTER — OFFICE VISIT (OUTPATIENT)
Dept: OCCUPATIONAL THERAPY | Facility: CLINIC | Age: 68
End: 2023-03-14

## 2023-03-14 DIAGNOSIS — I63.9 CEREBROVASCULAR ACCIDENT (CVA), UNSPECIFIED MECHANISM (HCC): ICD-10-CM

## 2023-03-14 DIAGNOSIS — I63.9 CEREBROVASCULAR ACCIDENT (CVA), UNSPECIFIED MECHANISM (HCC): Primary | ICD-10-CM

## 2023-03-14 DIAGNOSIS — R53.1 WEAKNESS: ICD-10-CM

## 2023-03-14 DIAGNOSIS — M54.16 LUMBAR RADICULOPATHY: ICD-10-CM

## 2023-03-14 DIAGNOSIS — R53.1 WEAKNESS: Primary | ICD-10-CM

## 2023-03-14 NOTE — PROGRESS NOTES
PT Evaluation          POC expires   5/10/23                   Visit/Unit Tracking  2/10                                                     Today's date: 3/14/2023  Patient name: Charlie Downing  : 1955  MRN: [id number]  Referring provider: Colette Garcia MD  Dx:   Encounter Diagnosis     ICD-10-CM    1  Weakness  R53 1       2  Cerebrovascular accident (CVA), unspecified mechanism ([facility] [address] )  I63 9       3  Lumbar radiculopathy  M54 16             Assessment  Assessment details: Patient is a 74 y o  Male who has been presenting to skilled outpatient PT with low back pain on right side secondary to CVA after affects  Patient initially presented with pain since getting new PLF brace to address drop foot  Initial PT sessions were utilized to assess new versus old brace as well as to address patient's low back pain with associated radicular symptoms  Patient presents with improved initial contact/heel strike with old brace, thus current plan is to reach out to orthotist to improve fit of old brace  Due to focus of IE being to address pain due to potential poor brace fit, majority of outcome measures were deferred  Outcome measures thus assessed for first time today to establish baseline values as patient transitions to gait and strengthening program post-CVA  He performed below age-matched normative values for the Lal, FGA, and 6 MWT, likely indicating overall deficits in static balance, dynamic balance, and cardiovascular endurance, respectively  Improvements illustrated in 5 x STS and 10 MWT  Per cutoff scores for the TUG, Lal, and FGA he is classified as HIGH risk for falls  All cutoff scores and normative values taken from the APTA Neuro Section and Rehab Measures  Plan to incorporate high intensity principals as this is current supported evidence for chronic CVA patients  He has met 6 short term goals at this time   Patient will continue to benefit from skilled PT for low back pain treatment, balance therapy and AFO assessment with overall goal to return to highest functional level possible with reduction in fall risk  Impairments: Abnormal coordination, Abnormal gait, Abnormal muscle tone, Abnormal or restricted ROM, Activity intolerance, Impaired balance, Impaired physical strength, Lacks appropriate HEP, Poor posture, Poor body mechanics, Pain with function, Weight-bearing intolerance, Abnormal movement and Abnormal muscle firing  Understanding of Dx/Px/POC: Good  Prognosis: Good      Patient verbalized understanding of POC  Please contact me if you have any questions or recommendations  Thank you for the referral and the opportunity to share in [address] [place] [address] Chris Rivera care          Plan  Planned modality interventions: TENS and Thermotherapy: Hydrocollator Packs  Planned therapy interventions: Abdominal trunk stabilization, ADL training, Balance, Balance/WB training, Breathing training, Body mechanics training, Coordination, Functional ROM exercises, Gait training, HEP, Neuromuscular re-education, Patient education, Postural training, Strengthening, Stretching, Therapeutic activities, Therapeutic exercises, Therapeutic training, Transfer training and Activity modification  Frequency: 2-3x/wk  Plan of Care beginning date: 2/10/2023  Plan of Care expiration date: 3 months - 5/10/2023  Treatment plan discussed with: Patient         Goals  Short Term Goals (4 weeks):    - Patient will complete TUG test - MET  - patient will report reduction in low back pain by 50% - MET  - Patient will complete FGA test - MET  - Patient will complete 6 MWT - MET  - Patient will be independent in basic HEP 2-3 weeks - MET  - Patient will improve 5xSTS score by 2 3 seconds from 18 73 seconds to 15 seconds to promote improved LE functional strength needed for ADLs - ONGOING  - Patient will complete components of CB&M to promote agility necessary for sports related tasks (d/c 3/14/2023)  - patient will complete 10 MWT - MET    Long Term Goals (12 weeks):  - Patient will be independent in a comprehensive home exercise program  - Patient will improve gait speed to 1 0 m/s with LRAD  - Patient will improve scoring 23/30 or higher indicating lower risk for falls with dynamic tasks  - Patient will be able to demonstrate HT in gait without veering  - Patient will improve 6 Minute Walk Test score to  800 feet to promote improved cardiovascular endurance  - Patient will report 50% reduction in near falls in order to improve safety with functional tasks and reduce his risk for falls  - Patient will report going on walks at least 3 days per week to promote independence and improved cardiovascular endurance  - Patient will be able to ascend/descend stairs reciprocally without UE assist to promote independence and safety with ADLs  - Patient will report 50% reduction in near falls when ambulating on uneven terrain  - Patient will report no low back pain radiating down leg/thigh       Cut off score    All date taken from APTA Neuro Section or Rehab Measures      Lal:  Erin et al , 2018  [employer] [address]: 2 7 pts    Mary Ann ramsey al , 2011  Cut-off score: 45/56    Chronic CVA  < 44/56 high risk for falls (Erin et al , 2018)  < 47 5/56 slow walker status (Alexandre ramsey al , 2011) 5xSTS: Estella 2010  [address] [employer]: 2 3 sec  Age Norms:  60-69: 11 1 sec  70-79: 12 6 sec  80-89: 14 8 sec    Leticia 2010, Chronic Stroke  Chronic CVA: 12 sec   TUG  Zandra , 2005  MDC: 2 9 sec    Cut off score:  >13 5 sec community dwelling adults  >32 2 frail elderly  <20 I for basic transfers  >30 dependent on transfers 10 Meter Walk Test:   Levi garcia , 2006  Small meaningful change: 0 06 m/s  Substantial meaningful change: 0 14 m/s  MCID: 0 16 m/s    < 0 4 m/s household ambulators  0 4 - 0 8 m/s limited community ambulators  > 0 8 m/s community ambulators   FGA: Zhao , 2010  MCID: 4 2 pts  Geriatrics/community < 22/30 fall risk  Geriatrics/community < 20/30 unexplained falls DGI  MDC: vestibular - 4 pts  MDC: geriatric/community - 3 pts  Falls risk <19/24   6 Minute Walk Test  Chiqui garcia , 2006  MDC: 60 98 m (200 01 ft)    Geovanna Bansal, & Cut off, 2012  MCID: 34 4 m    Age Norms  60-69: M - 1876 ft   F - 1765 ft  70-79: M - 1729 ft   F - 1545 ft  80-89: M - 1368 ft   F - 1286 ft Modified Levy  0: No increase in tone  1: Catch and release or min resistance at end range  1+: Catch f/b min resistance throughout remainder (< half ROM)  2: Easily moved, but more marked tone throughout most ROM  3: Significant tone, PROM difficult  4: Rigid   MiniBest: Katerine et al , 2013  CVA < 17 5 fall risk Pass (Acute CVA)  MDC: 1 8 points (acute), 3 2 points (chronic)         Subjective    History of Present Illness  Mechanism of injury: Pt reports CVA 3 years ago, splits time between [place] and [address] [place] [address]  Notes getting a new brace PLF but notes low back has been bothering him more now due to walking  Notes walking on toes and is getting a shin splint like issue  Updated (3/14/2023): Patient reports overall satisfaction with PT services thus far  States his back pain is largely resolved and he has been wearing his old brace  He wants to continue to work on strength, balance, and gait  He is waiting to hear back regarding lead therapist reaching out to orthotist regarding his old brace's fit      Primary AD: [facility]  Assist level at home: spouse   WC usage: none   PLOF: SBQC     Pain  Current pain ratin/10  At best pain ratin/10  At worst pain ratin/10  Location: R   Aggravating factors: none     Social Support  Steps to enter house: none   Stairs in house: none   Lives in: ranc[place]   Lives with: spouse     Employment status: retired   Hand dominance: left hand now     Treatments  Previous treatment: Nov in [place]  Current treatment: None   Diagnostic Testing: none       Objective       LE MMT  - R Hip Flexion: 3-/5   - L Hip Flexion: 4-/5  - R Hip Extension: 3-/5  - L Hip Extension: 4-/5  - R Hip Abduction: 4-/5  - L Hip abduction: 4-/5  - R Hip adduction: 4-/5  - L Hip adduction: 4-/5  - R Knee Extension: 4-/5  - L Knee Extension: 4-/5  - R Knee Flexion: 4-/5   - L Knee Flexion: 4-/5  - R Ankle DF: 2-/5   - L Ankle DF: 4/5  - R Ankle PF: 2-/5   - L Ankle PF: 4/5    Sensation  - Light touch: intact  - Deep pressure: intact     Coordination  - Dysdiadochokinesia: unable   - Alternating Toe Taps: decrease on right, limited active DF movement     Modified Levy  - R knee extension: 1 - catch and release or minimum resistance at end range  - L knee extension: 0 - no increase in tone  - R knee flexion: 1 - catch and release or minimum resistance at end range   - L knee flexion: 0 - no increase in tone  - R ankle DF: 3 - significant tone, PROM difficulty      - L ankle DF: 0 - no increase in tone  - R ankle inversion: 3 - significant tone, PROM difficulty     - L ankle inversion: 0 - no increase in tone  - R ankle eversion: 3 - significant tone, PROM difficulty     - L ankle eversion: 0 - no increase in tone    Clonus  - L: No  - R: No    Vision  - Glasses: No  - Abnormalities prior to CVA: No,     Neglect  - R sided: No  - L sided: No    Pusher's Syndrome  - R sided: No  - L sided: No      Outcome Measures Initial Eval  2/10/2023 DN  2/14/2023 PN  3/14/2023      5xSTS 18 73 sec  16 33 sec;   1 UE      TUG  sec  16 30 sec w/ quad cane    14 38 sec NO AD      10 meter  m/s 0 51 m/s with quad cane 0 78 m/s w/ quad cane      ZAVALETA /56  38/56      6MWT  ft  700 ft w/ quad cane      FGA   9/30 NO AD               Precautions: Fall risk   Past Medical History:   Diagnosis Date   • Hyperlipidemia    • Hypertension

## 2023-03-14 NOTE — PROGRESS NOTES
Daily Note     Today's date: 3/14/2023  Patient name: Katrin Zheng  : 1955  MRN: [id number]  Referring provider: Rosa Harp MD  Dx:   Encounter Diagnosis     ICD-10-CM    1  Cerebrovascular accident (CVA), unspecified mechanism ([facility] [address] )  I63 9       2  Weakness  R53 1                      Subjective: Pt reports he has been wearing rest hand splint since delivery  OT reeducated pt on recommended wear schedule    Objective: See treatment diary below  NMRE/TA:  -OTR fit pre-malia resting hand splint to pt's R UE and educated on wear schedule and signs of skin breakdown  Pt acknowledges understanding    -Modified pushups in stance with FES to R triceps and assist to maintain hand and wrist positioning  20x with focus on NMR R UE for reaching tasks     -Pt performs stackable peg activity with four foot theraband wrap to facilitate movement and provide proprioceptive input  Wrap starts at metacarpal heads, continues proximally b/l UE to axilla, crosses posterior of thoracic spine to pull b/l elbows into flexion and shoulders in extension  Focus on [facility], rotation of items in R hand, shoulder internal/external rotation, elbow extension    -[place] manipulation block flipping activity with serial 3 subtraction with focus on rotation of items in hand, sequencing, working memory  3 errors in sequence  Requires significantly inc time for manipulation and processing and drops 2 items  Then performed transfer of ~15 items from table top to container with green theraband wrap still donned and container anterior     -Pt performed matching clips task onto matching paper with focus on [facility], manipulation of items in hand, motor control and coordination, grasp and release of items in hand, pincer grasp, elbow extension and dexterity  Pt retrieved clips from box located anteriorly to pt and clipped matching clips onto paper located anteriorly and vertical to pt  Pt dropped 2 clips   Pt completed task with increased inc time      -Pt performed marble transfer onto letter grid using R UE with focus on motor planning and control, [address] [place], grasp and release of items in hand, in hand manipulation, palm to digit transfer, hand eye coordination, and working memory  Pt dropped 5 marbles when transferring marbles onto letter grid  Pt completed task with 3 marbles in palm of hand  Pt completed task with increased inc time     -Card sort task with focus on motor planning and control, [address], grasp and release of items in hand, supination and pronation of wrist  Pt sorted 15 cards with increased inc time      -Pt transferred coins into piggy bank located anteriorly on table top surface  Focus on motor planning and control, [address], grasp and release of items in hand, supination and pronation of wrist, lateral grasp, and dexterity  Pt transferred 4 coins into piggy bank then task was modified with piggy bank turned horizontal to pt to inc radial deviation of L UE  Pt completed task with increased inc time  Assessment: Tolerated treatment well  Patient would benefit from continued OT services with focus on R UE strengthening, dexterity, motor planning and control, and [address] [place]  Plan: Continue POC and home exercise perform     HEP: (give daily checklists for pt to complete every week to inc compliance)  -Shoulder flexion in supine   -Shoulder elevation with 5 sec iso holds (in front of mirror)  -Scap retraction with 5 sec iso holds  -Lateral forearm pushups with stabilizing R hand with L UE  -Elbow flexion  -Mass finger flexion/extension with AAROM PRN

## 2023-03-15 ENCOUNTER — APPOINTMENT (OUTPATIENT)
Dept: OCCUPATIONAL THERAPY | Facility: CLINIC | Age: 68
End: 2023-03-15

## 2023-03-16 ENCOUNTER — OFFICE VISIT (OUTPATIENT)
Dept: OCCUPATIONAL THERAPY | Facility: CLINIC | Age: 68
End: 2023-03-16

## 2023-03-16 ENCOUNTER — APPOINTMENT (OUTPATIENT)
Dept: OCCUPATIONAL THERAPY | Facility: CLINIC | Age: 68
End: 2023-03-16

## 2023-03-16 ENCOUNTER — OFFICE VISIT (OUTPATIENT)
Dept: PHYSICAL THERAPY | Facility: CLINIC | Age: 68
End: 2023-03-16

## 2023-03-16 DIAGNOSIS — I63.9 CEREBROVASCULAR ACCIDENT (CVA), UNSPECIFIED MECHANISM (HCC): ICD-10-CM

## 2023-03-16 DIAGNOSIS — I63.9 CEREBROVASCULAR ACCIDENT (CVA), UNSPECIFIED MECHANISM (HCC): Primary | ICD-10-CM

## 2023-03-16 DIAGNOSIS — R53.1 WEAKNESS: Primary | ICD-10-CM

## 2023-03-16 DIAGNOSIS — M54.16 LUMBAR RADICULOPATHY: ICD-10-CM

## 2023-03-16 DIAGNOSIS — R53.1 WEAKNESS: ICD-10-CM

## 2023-03-16 NOTE — PROGRESS NOTES
Daily Note     POC expires   5/10/23                   Visit/Unit Tracking  3/9                                              Today's date: 3/16/2023  Patient name: Rosa Mckeon  : 1955  MRN: [id number]  Referring provider: Ekta Kirkpatrick MD  Dx:   Encounter Diagnosis     ICD-10-CM    1  Weakness  R53 1       2  Cerebrovascular accident (CVA), unspecified mechanism ([facility] [address] )  I63 9       3  Lumbar radiculopathy  M54 16                      Subjective: Patient reports to PT session with his original brace donned, reports no pain prior to treatment       Objective: See treatment diary below    NMR:   - STS 2x10 stagger feet to increased weight bearing right side with foam pad on chair   - step taps: 20 reps, 2 sets with manual blocking of R knee at times to prevent knee flexion   - Step ups and down: 20 reps 2 sets, with focus on hip flexion with ascent and descent   - Amb with focus on knee flexion with HHA  250 feet cuing for increased stride length x 2 (10# ankle weights on B/L LE)  - Sidestepping over hurdles: 2 laps w/ 10# ankle weights, 2 laps w/o ankle weights          Assessment: Patient tolerated treatment well  Continued to utilize ankle weights to increase LE resistance to further challenge patient dynamic balance along with functional mobility with CVA  With staggered stance, patient forced to increase WBing though RLE and increase difficulty  Post ankle weight exercises, patient displays appropriate carryover with foot clearance to reduce toe drag during ambulation  Patient would benefit from continued PT focused on mobility deficits due to gait deficits  Plan: Continue per plan of care            Outcome Measures Initial Eval  2/10 DN   23       5xSTS 18 73 sec        TUG  sec        10 meter  m/s 0 51 m/s w/ quad cane       ZAVALETA /56        FGA /30        DGI /24        MiniBEST /28        PASS /36        6MWT  ft        CB&M /96

## 2023-03-16 NOTE — PROGRESS NOTES
Daily Note     Today's date: 3/16/2023  Patient name: Velma Benites  : 1955  MRN: [id number]  Referring provider: Olimpia Jaimes MD  Dx:   Encounter Diagnosis     ICD-10-CM    1  Cerebrovascular accident (CVA), unspecified mechanism ([facility] [address] )  I63 9       2  Weakness  R53 1           Start Time: 1545  Stop Time: 1630  Total time in clinic (min): 45 minutes    Subjective: Pt reports he has been wearing rest hand splint since delivery  OT reeducated pt on recommended wear schedule    Objective: See treatment diary below  NMRE:  -Completed seal stretch 2x10 for RUE WB to provide proprioceptive input  Required facilitation of wrist/digit extension    -Completed low-tall kneel with BUE WB on large bosu focusing on RUE WB, motor control and coordination for functional mobility  Graded up to completing with blue theraband around hips to provide resistance when transitioning to tall kneeling    -Scapular mobilizations for protraction/retraction and upward/downward rotation in preparation for reaching activities  -Multimatrix with white cubes placed shape side up to R side and pt picking up to place on shape card on anterior table top focusing on motor control, [address]  Provided proximal stabilization to improve motor control during release on target  Assessment: Tolerated treatment well  Patient would benefit from continued OT services with focus on R UE strengthening, dexterity, motor planning and control, and [address]  Plan: Continue POC and home exercise perform     HEP: (give daily checklists for pt to complete every week to inc compliance)  -Shoulder flexion in supine   -Shoulder elevation with 5 sec iso holds (in front of mirror)  -Scap retraction with 5 sec iso holds  -Lateral forearm pushups with stabilizing R hand with L UE  -Elbow flexion  -Mass finger flexion/extension with AAROM PRN

## 2023-03-21 ENCOUNTER — OFFICE VISIT (OUTPATIENT)
Dept: PHYSICAL THERAPY | Facility: CLINIC | Age: 68
End: 2023-03-21

## 2023-03-21 ENCOUNTER — OFFICE VISIT (OUTPATIENT)
Dept: OCCUPATIONAL THERAPY | Facility: CLINIC | Age: 68
End: 2023-03-21

## 2023-03-21 DIAGNOSIS — M54.16 LUMBAR RADICULOPATHY: ICD-10-CM

## 2023-03-21 DIAGNOSIS — I63.9 CEREBROVASCULAR ACCIDENT (CVA), UNSPECIFIED MECHANISM (HCC): ICD-10-CM

## 2023-03-21 DIAGNOSIS — I63.9 CEREBROVASCULAR ACCIDENT (CVA), UNSPECIFIED MECHANISM (HCC): Primary | ICD-10-CM

## 2023-03-21 DIAGNOSIS — R53.1 WEAKNESS: Primary | ICD-10-CM

## 2023-03-21 DIAGNOSIS — R53.1 WEAKNESS: ICD-10-CM

## 2023-03-21 NOTE — PROGRESS NOTES
Daily Note     Today's date: 3/21/2023  Patient name: Kentrell Maher  : 1955  MRN: [id number]  Referring provider: Avi Simental MD  Dx:   Encounter Diagnosis     ICD-10-CM    1  Cerebrovascular accident (CVA), unspecified mechanism ([facility] [address] )  I63 9       2  Weakness  R53 1       3  Lumbar radiculopathy  M54 16           Start Time: 1415  Stop Time: 1500  Total time in clinic (min): 45 minutes    Subjective: Pt reports no changes since last session  Objective: See treatment diary below  NMRE:  -Towel slide exercise on table top with FES to posterior bicep with focus on R elbow extension, motor planning and control, coordination, strengthening and functional mobility  Pt pushed towel anterior on table top surface with 2 lb wrist weight in towel to inc strengthening and elbow extension  Pt completed 20 reps  -Hammer curls with FES to anterior bicep with focus on R elbow flexion and strengthening, motor planning and control, and coordination  Pt performed task standing  Pt performed 20 reps  -Rice transfer task with focus on pronation and supination of R wrist, motor planning and control, coordination, and functional mobility  Pt transferred rice from container of table top surface located anterior to pt into container located to pts L side  Pt required 2 VC to use cylinder grasp to hold cup to transfer/dump rice  -Benji flip task with focus on grasp and release of items in hands, motor control and coordination, hand eye coordination, in hand manipulation, and functional mobility  Pt flipped benji blocks over on table top so numbers and letters were facing up  Pt dropped 1 benji when flipping the block on the table top surface  Pt completed flipped 12 dominos   -Prichard line up with focus on grasp and release of items in hands, motor control and coordination, hand eye coordination, in hand manipulation, and functional mobility   Task modified with pt lining up dominos horizontally instead of vertical  Pt dropped 2 dominos  Assessment: Tolerated treatment well  Patient would benefit from continued OT services with focus on R UE strengthening, dexterity, motor planning and control, and [address]  Plan: Continue POC and home exercise perform     HEP: (give daily checklists for pt to complete every week to inc compliance)  -Shoulder flexion in supine   -Shoulder elevation with 5 sec iso holds (in front of mirror)  -Scap retraction with 5 sec iso holds  -Lateral forearm pushups with stabilizing R hand with L UE  -Elbow flexion  -Mass finger flexion/extension with AAROM PRN

## 2023-03-21 NOTE — PROGRESS NOTES
Daily Note     POC expires   5/10/23                   Visit/Unit Tracking  3/9                                              Today's date: 3/21/2023  Patient name: Pilar Howell  : 1955  MRN: [id number]  Referring provider: Yolie Castelan MD  Dx:   Encounter Diagnosis     ICD-10-CM    1  Weakness  R53 1       2  Cerebrovascular accident (CVA), unspecified mechanism ([facility] [address] )  I63 9       3  Lumbar radiculopathy  M54 16                      Subjective: Patient reports to PT session with his original brace donned, reports no pain prior to treatment       Objective: See treatment diary below    NMR:   - STS 2x10 w/ foam on chair and under L foot  - step taps: 20 reps, 2 sets with manual blocking of R knee at times to prevent knee flexion w/ 10# ankle weight, 8" step  - Treadmill: 5 minutes (x2 sets) @ 0 8-1 mph, 10# ankle weight (HR  bpm)  - Step ups and down: 20 reps, with focus on hip flexion with ascent and descent           Assessment: Patient tolerated treatment well  Began patient with treadmill training to continue to progress patient toward high intensity gait training  10# ankle weight utilized for all exercises to increase difficulty and challenge patient to maintain balance despite addition of resistance  When on treadmill, patient displays reduced RLE coordination when placing steps, having 2 instances of missing treadmill belt and requiring PT assist to prevent LOB  Patient would benefit from continued PT focused on mobility deficits due to gait deficits  Plan: Continue per plan of care            Outcome Measures Initial Eval  2/10 DN   23       5xSTS 18 73 sec        TUG  sec        10 meter  m/s 0 51 m/s w/ quad cane       ZAVALETA /56        FGA /30        DGI /24        MiniBEST /28        PASS /36        6MWT  ft        CB&M /96

## 2023-03-23 ENCOUNTER — OFFICE VISIT (OUTPATIENT)
Dept: PHYSICAL THERAPY | Facility: CLINIC | Age: 68
End: 2023-03-23

## 2023-03-23 ENCOUNTER — EVALUATION (OUTPATIENT)
Dept: OCCUPATIONAL THERAPY | Facility: CLINIC | Age: 68
End: 2023-03-23

## 2023-03-23 DIAGNOSIS — R53.1 WEAKNESS: Primary | ICD-10-CM

## 2023-03-23 DIAGNOSIS — I63.9 CEREBROVASCULAR ACCIDENT (CVA), UNSPECIFIED MECHANISM (HCC): ICD-10-CM

## 2023-03-23 DIAGNOSIS — M54.16 LUMBAR RADICULOPATHY: ICD-10-CM

## 2023-03-23 DIAGNOSIS — I63.9 CEREBROVASCULAR ACCIDENT (CVA), UNSPECIFIED MECHANISM (HCC): Primary | ICD-10-CM

## 2023-03-23 DIAGNOSIS — R53.1 WEAKNESS: ICD-10-CM

## 2023-03-23 NOTE — PROGRESS NOTES
OT Re-Evaluation     Today's Date: 3/23/2023  Patient Name: Frank Woodall  : 1955  MRN: [id number]  Referring Provider: Jasen To MD  Dx: Cerebrovascular accident (CVA), unspecified mechanism ([address] ) [I63 9]      SKILLED ANALYSIS:  Pt is a R handed male presenting presenting for re-evaluation s/p CVA in 2019  Pt is demonstrating difficulty with UB dressing and is unable to fasten buttons and requires increased time performing ADLs and IADLs  Pt is demonstrating R UE spacticity, impaired RUE coordination, limited ROM and strength which is affecting his ablilty to complete ADLs and IADLs  Pt is currently demonstrating the following deficits: UE strength, glenohumeral subluxation, overall functional cognition, scapular winging, spacticity and limited R UE ROM  Pt's deficits noted based upon the following assessements Manual Muscle Test, Modified Levy Scale, MoCA, Trail Making Part A and B, Contextual Memory, Fugl-Ritchie Assessment, ROM assessment  Pt achieved 3 STG's from POC  Pt recommended to attend OP OT services 2x per week for the next 8 weeks  Findings and recomendations discussed with pt and he is in agreement with POC  PLAN OF CARE START: 23  PLAN OF CARE END: 23  FREQUENCY: 2x per week 12 weeks  PRECAUTIONS: Fall risk, standard, HTN      Subjective   Pt reports no major changes since [facility]  Reports can move fingers better  Mechanism of Injury  Pt dx with CVA in 2019 affecting R (dominant) side of body  Occupational Profile  Pt lives with his wife in a [facility]  Pt spends time at his house in [place] and [facility] in [place]  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   Pt reports he is independent getting dressing except donning/doffing his jacket  Pt does not wear clothing with buttons or zippers  Pts wife reports she manages his medication and meal preps  Pt reports he does not use his R UE for eating  Pt reports one fall in the past six months at home in [place]   Pt reports he does not use his R UE for ADLs or IADLs  Pt reports his R UE shoulder is always in pain  Pt uses a cane to walk and has a leg brace he wears on his R LE  Pts wife reports he has a R wrist brace to wear when sleeping but does not wear it  PATIENT GOAL: "Work on my movement"    HISTORY OF PRESENT ILLNESS:     Pt is a 79 y o  male who was referred to Occupational Therapy s/p  Cerebrovascular accident (CVA), unspecified mechanism ([facility] [address] ) [I63 9]  PMH:   Past Medical History:   Diagnosis Date   • Hyperlipidemia    • Hypertension        Past Surgical Hx: No past surgical history on file       Pain Levels:      Restin/10 in R shoulder      Objective    Impairments Section:                 Range of Motion:  AROM:   RUE   -  shoulder flexion 45% (prior: 30%)  - elbow flexion 90% (prior: 80%)  -  elbow extension 90%  -   wrist flexion 100%  -  wrist extension 80%  LUE within normal limits     MMT:  R UE:    -shoulder abduction: 2/5  -  shoulder flexion: 2+/5 (prior: 2/5)  - elbow flexion: 3+/5 (prior: 3/5)  -  elbow extension: 3/5 (prior: 3-/5)  -   wrist flexion: 4-/5 (prior: 3+/5)  -  wrist extension: 3+/5 (3/5)        L UE 5/5 in all pivots          FUGYL JUNIOR ASSESSMENT   Performed fugyl junior assessment of the upper extremity to measure pt's motor impairment with upper extremity scores:  UPPER EXTREMITY SEATED:  (prior: )  WRIST: 3/10 (prior: 3/10)  HAND:  (prior: )  COORDINATION/SPEED:  (prior: )  OVERALL SCORE: / (prior: )     (clinical change= 5 points, severe impairment <19, and mild impairment is >50)          Further Observations in UE Assessment    Subluxation: 3/4 of finger width in R glenohumeral joint    Mild scapular winging and elevation noted at rest    Spasticity Noted           MODIFIED YARELY SCALE:   Performed modified yarely scale to assess spasticity of R upper extremity:  Shoulder external rotators: subluxation  Shoulder internal rotators: Not assessed  Shoulder flexors: Not assessed  Elbow flexors: 1+/4 (prior: 2/4)  Elbow extensors: 1/4  Wrist flexors: 1/4 (prior: 1+/4)  Wrist extensors 1/4  Wrist pronators 3/4  Wrist supinators 0/4  Finger flexors: 0/4  Finger extensors: 3/4   0: No increase in muscle tone  1: Slight increase in muscle tone, manifested by a catch and release or by minimal resistance at the end of the range of motion when the affected part(s) is moved in flexion or extension  1+: Slight increase in muscle tone, manifested by a catch, followed by minimal resistance throughout the remainder (less than half) of the ROM  2: More marked increase in muscle tone through most of the ROM, but affected part(s) easily moved  3: Considerable increase in muscle tone, passive movement difficult  4: Affected part(s) rigid in flexion or extension    Levon Cognitive Assessment Version 8 2 (MoCA V8 2)  Visuospatial/executive functionin/5 (2/5)  Naming:  3/3 (3/3)  Memory: 1st trial:  5/5 (4/5), 2nd trial: 5/5 (5/5)  Attention/concentration: 2/2 (2/2)  List of letters: 1/ (1/)  Serial Seven Subtraction:  2/3 (0/3 w/ 0 errors)  Language/sentence repetition: 0/2 (1/2)  Language Fluency: 1/ (1/)  Abstract/Correlational Thinkin/2 (1/2)  Delayed Recall: 4/5 (4/5)  Orientation: 4/6 (5/6)              Memory Index Score: 14/15 (prior: 13/15)  MoCA V1 8 2 Raw Score: , MIS: 15/15, indicative of mild neurocognitive impairments (prior: , MIS:  13/15, indicative of mild neurocognitive impairments)     MOCA Scoring        Normal: 26+        Mild Cognitive Impairment: 18-25         Moderate Cognitive Impairment: 10-17        Severe Cognitive Impairment: <10        Trail making Part A and Part B:   Part A: 38 07 seconds (prior: 50 4 seconds)  Part B: 1:06 20 seconds (prior: 1 minute 18 seconds with 1 VCs for recall of instructions)  Indicating deficits: Part A deficit > 39 14 seconds and Part B deficit > 91 32 seconds       CMT:  Immediate Recall: 17/20 (18/20)  Delayed Recall: 17/20 (17/20)    TE:  -Scapular mobilization to address scapular protraction, retraction, elevation, and depression in preparation for WB and reaching activities  NMRE:   -Pt performed serratus anterior push-ups to address scapular strengthening and UE motor control  -Pt performed lateral push-ups to address UE motor control and UE strengthening/endurance  -Pt performed shoulder flexion using 2lb weighted bar to address UE strengthening/endurance, bimanual coordination, and UE motor control  -Pt transferred squigs from lateral mat to anterior mirror to address UE strengthening, motor control, and gross grasp strength    GOALS:   Short Term Goals:  Pt will demo improved R shoulder strength in order to score a 2+/5 on the shoulder flexion portion of MMTto increase independence with IADLs ACHIEVED  Pt will score lower then a 2/4 on the digit extensors portions of the Modified Levy Scale assessment to increase independence with IADLs  PROGRESSING  Pt will demo improved R UE function by scoring a 25/66 or high on the Fugl-Ritchie Assessment to increase independence with IADLs  ACHIEVED  Pt will demo good carryover of clinic and home tone reduction strategies for improved AROM to increase independence donning/doffing jacket  PROGRESSING  Pt will demo good carryover of of Home Exercise Program to improve functional progression towards goals in R UE  PROGRESSING        Long Term Goals: 8-12 weeks  Pt will demo improved R shoulder strength in order to score a 3-/5 on the shoulder flexion portion of MMTto increase independence with IADLs  PROGRESSING  Pt will score lower then a 1+/4 on the digit extensors portions of the Modified Levy Scale assessment to increase independence with IADLs  PROGRESSING  Pt will demo improved R UE function by scoring a 26/66 or high on the Fugl-Ritchie Assessment to increase independence with IADLs   PROGRESSING  Pt will demo good carryover of clinic and home tone reduction strategies for improved AROM to increase independence donning/doffing jacket  PROGRESSING  Pt will demo good carryover of of Home Exercise Program to improve functional progression towards goals in R UE  PROGRESSING    Goals added 2/28/23  -Pt will demo improved sustained attention by completing Trail Making Part A in under 43 seconds  ACHIEVED  -Pt will demo improved divided attention by completing Trail Making Part B in under 1 minute with 0 VC for direction following  PROGRESSING  Pt will demo improved functional cognition by scoring over a 21/30 on the MoCA   NOT ACHIEVED

## 2023-03-23 NOTE — PROGRESS NOTES
Daily Note     POC expires   5/10/23                   Visit/Unit Tracking  3/9                                              Today's date: 3/23/2023  Patient name: Carlos Avelar  : 1955  MRN: [id number]  Referring provider: Nicho Luna MD  Dx:   Encounter Diagnosis     ICD-10-CM    1  Weakness  R53 1       2  Cerebrovascular accident (CVA), unspecified mechanism ([facility] [address] )  I63 9       3  Lumbar radiculopathy  M54 16                      Subjective: Patient reports to PT session with his original brace donned, reports no pain prior to treatment  "I'm doing good, how can I complain!"      Objective: See treatment diary below    Vitals:  - HR remained between 76 - 128 bpm t/o session   HIGT target HR: 108 - 131 bpm    NMR:    - STS 2x12 w/ foam on chair and under L foot  - Step taps: 2x20 reps w/ 10# ankle weight, 8" step  - Treadmill: 2 x 5 minutes, @ 0 8-1 mph, 10# ankle weight (HR  bpm)   1 near lob due to R toe drag   Used Solostep  - Step ups and down on 8" step: 20 reps; removed 10# ankle weight      Assessment: Patient tolerated treatment well  Continued high intensity exercises  10# ankle weight utilized for most exercises to increase intensity and challenge patient's RLE strength and balance  On treadmill, pt with one near LOB due to R toe drag  Towards end of sets, noted decrease stride length and difficulty clearing R leg due to an increase in fatigue  Utilization of SoloStep for enhanced pt safety  Patient would benefit from continued skilled PT to focus on mobility and gait deficits and to improve pt safety at home and in the community  Plan: Continue per plan of care            Outcome Measures Initial Eval  2/10 DN   23       5xSTS 18 73 sec        TUG  sec        10 meter  m/s 0 51 m/s w/ quad cane       ZAVALETA /56        FGA /30        DGI /24        MiniBEST /28        PASS /36        6MWT  ft        CB&M /96

## 2023-03-28 ENCOUNTER — APPOINTMENT (OUTPATIENT)
Dept: PHYSICAL THERAPY | Facility: CLINIC | Age: 68
End: 2023-03-28

## 2023-03-28 ENCOUNTER — OFFICE VISIT (OUTPATIENT)
Dept: OCCUPATIONAL THERAPY | Facility: CLINIC | Age: 68
End: 2023-03-28

## 2023-03-28 ENCOUNTER — APPOINTMENT (OUTPATIENT)
Dept: OCCUPATIONAL THERAPY | Facility: CLINIC | Age: 68
End: 2023-03-28

## 2023-03-28 ENCOUNTER — OFFICE VISIT (OUTPATIENT)
Dept: PHYSICAL THERAPY | Facility: CLINIC | Age: 68
End: 2023-03-28

## 2023-03-28 DIAGNOSIS — I63.9 CEREBROVASCULAR ACCIDENT (CVA), UNSPECIFIED MECHANISM (HCC): Primary | ICD-10-CM

## 2023-03-28 DIAGNOSIS — R53.1 WEAKNESS: ICD-10-CM

## 2023-03-28 DIAGNOSIS — R53.1 WEAKNESS: Primary | ICD-10-CM

## 2023-03-28 DIAGNOSIS — M54.16 LUMBAR RADICULOPATHY: ICD-10-CM

## 2023-03-28 DIAGNOSIS — I63.9 CEREBROVASCULAR ACCIDENT (CVA), UNSPECIFIED MECHANISM (HCC): ICD-10-CM

## 2023-03-28 NOTE — PROGRESS NOTES
Daily Note     Today's date: 3/28/2023  Patient name: Ann Solis  : 1955  MRN: [id number]  Referring provider: Dimas Swann MD  Dx:   Encounter Diagnosis     ICD-10-CM    1  Cerebrovascular accident (CVA), unspecified mechanism ([facility] [address] )  I63 9       2  Weakness  R53 1           Start Time: 1415  Stop Time: 1500  Total time in clinic (min): 45 minutes    Subjective: Pt reports no changes since last session  Objective: See treatment diary below  NMRE:  -Shoulder flexion with 2lb therabar with rings donned to provide visual input regarding UE placement  3 second isometric holds  Focus on motor planning, improving proximal control R UE, NMR R UE  -PNF D1 and D2 flexion in stance with 2lb med ball 20x each direction  Focus on reaching XML, motor planning R UE, bimanual coordination  -Screw/bolt activity with use of R UE with focus on rotation of items within finger tips, FMC, dexterity, motor planning  Completes 5 on/off with SIGNIFICANTLY inc time  -dice rotation in hand with use of table to compensate ~75% of items with use of R UE    Assessment: Tolerated treatment well  Patient would benefit from continued OT services with focus on R UE strengthening, dexterity, motor planning and control, and [facility]  Plan: Continue POC and home exercise perform     HEP: (give daily checklists for pt to complete every week to inc compliance)  -Shoulder flexion in supine   -Shoulder elevation with 5 sec iso holds (in front of mirror)  -Scap retraction with 5 sec iso holds  -Lateral forearm pushups with stabilizing R hand with L UE  -Elbow flexion  -Mass finger flexion/extension with AAROM PRN

## 2023-03-28 NOTE — PROGRESS NOTES
"Daily Note     POC expires   5/10/23                   Visit/Unit Tracking  3/9                                              Today's date: 3/28/2023  Patient name: Leisa Garces  : 1955  MRN: [id number]  Referring provider: Lamar Dodd MD  Dx:   Encounter Diagnosis     ICD-10-CM    1  Weakness  R53 1       2  Cerebrovascular accident (CVA), unspecified mechanism ([facility] [address] )  I63 9       3  Lumbar radiculopathy  M54 16                      Subjective: Patient presents to PT with SPC and no new changes, complaints or falls  Objective: See treatment diary below    Vitals:  - HR remained between 76 - 128 bpm t/o session   HIGT target HR: 108 - 131 bpm  Pre 97%, 60bpm    NMR:    - STS 2x12 w/ foam on chair and balance pod L foot  - Step taps: 2x20 reps w/ 10# R ankle weight, 8\" step   - Treadmill w solostep:  (HR 74-84 bpm)        -5 minutes, @ 0 5-8 mph, 10# R ankle weight         -3 minutes, @ 0 8-1 0 mph, 10# R ankle weight   - Forward propulsion ambulation following treadmill: 200 ft SPC/ no AD, 2 sets      Assessment: Patient tolerated treatment session well today with continued focus on brianna intensity training to promote neuroplastsity following CVA  He was able to perform most exercises with 10# R ankle weight to promote RLE limb advancement  Patient required frequent rest periods due to fatigue but was able to recover in <1 minute  Introduced ambulation with forward propulsion immediately following treadmill, patient able to perform without AD second trial  Patient could benefit from weighted vest during therapy session, plan to trial next session  He will continue to benefit from continued skilled PT to focus on mobility and gait deficits to improve function and safety at home and in his community  Plan: Continue per plan of care            Outcome Measures Initial Eval  2/10 DN   23       5xSTS 18 73 sec        TUG  sec        10 meter  m/s 0 51 m/s w/ quad cane       ZAVALETA /56        FGA " /30        DGI /24        MiniBEST /28        PASS /36        6MWT  ft        CB&M /96

## 2023-03-29 ENCOUNTER — APPOINTMENT (OUTPATIENT)
Dept: OCCUPATIONAL THERAPY | Facility: CLINIC | Age: 68
End: 2023-03-29

## 2023-03-29 ENCOUNTER — APPOINTMENT (OUTPATIENT)
Dept: PHYSICAL THERAPY | Facility: CLINIC | Age: 68
End: 2023-03-29

## 2023-03-30 ENCOUNTER — APPOINTMENT (OUTPATIENT)
Dept: PHYSICAL THERAPY | Facility: CLINIC | Age: 68
End: 2023-03-30

## 2023-03-30 ENCOUNTER — APPOINTMENT (OUTPATIENT)
Dept: OCCUPATIONAL THERAPY | Facility: CLINIC | Age: 68
End: 2023-03-30

## 2023-04-04 ENCOUNTER — APPOINTMENT (OUTPATIENT)
Dept: OCCUPATIONAL THERAPY | Facility: CLINIC | Age: 68
End: 2023-04-04

## 2023-04-05 ENCOUNTER — OFFICE VISIT (OUTPATIENT)
Dept: PHYSICAL THERAPY | Facility: CLINIC | Age: 68
End: 2023-04-05

## 2023-04-05 ENCOUNTER — OFFICE VISIT (OUTPATIENT)
Dept: OCCUPATIONAL THERAPY | Facility: CLINIC | Age: 68
End: 2023-04-05

## 2023-04-05 DIAGNOSIS — M54.16 LUMBAR RADICULOPATHY: ICD-10-CM

## 2023-04-05 DIAGNOSIS — R53.1 WEAKNESS: ICD-10-CM

## 2023-04-05 DIAGNOSIS — I63.9 CEREBROVASCULAR ACCIDENT (CVA), UNSPECIFIED MECHANISM (HCC): ICD-10-CM

## 2023-04-05 DIAGNOSIS — I63.9 CEREBROVASCULAR ACCIDENT (CVA), UNSPECIFIED MECHANISM (HCC): Primary | ICD-10-CM

## 2023-04-05 DIAGNOSIS — R53.1 WEAKNESS: Primary | ICD-10-CM

## 2023-04-05 NOTE — PROGRESS NOTES
"Daily Note     POC expires   5/10/23                   Visit/Unit Tracking  3/9                                              Today's date: 2023  Patient name: Snehal Esparza  : 1955  MRN: [id number]  Referring provider: Erin Holder MD  Dx:   Encounter Diagnosis     ICD-10-CM    1  Weakness  R53 1       2  Cerebrovascular accident (CVA), unspecified mechanism ([facility] [address] )  I63 9       3  Lumbar radiculopathy  M54 16                      Subjective: Patient presents to PT with SPC and no new changes, complaints or falls  Objective: See treatment diary below    Vitals:  - HR remained between 76 - 128 bpm t/o session   HIGT target HR: 108 - 131 bpm  Pre 96%, 74 bpm    NMR:    - STS 2x12 w/ foam on chair and balance pod L foot; 0-1 UE pushoff  - Step taps: 2x20 reps w/ 10# R ankle weight, 8\" step   - Treadmill w solostep:  (HR 74-84 bpm)        -5 minutes, @ 1 2 mph, 10# R ankle weight         -5 minutes, @ 0 8 mph, 3% incline 10# R ankle weight  - Forward propulsion ambulation following treadmill: 200 ft SPC/ no AD, 2 sets      Assessment: Patient tolerated treatment session well today with continued focus on brianna intensity training to promote neuroplastsity following CVA  Patient unable to complete STS trials without use of single UE pushoff  Able to increase speed of treadmill training this date which appeared to contribute to longer step length B/L, however patient fatigued quickly  Consider use of gait  mode to provide external cues of patient's gait pattern  Introduced treadmill training at incline to further encourage improved hip and knee flexion in RLE for improved clearance  He will continue to benefit from continued skilled PT to focus on mobility and gait deficits to improve function and safety at home and in his community  Plan: Continue per plan of care            Outcome Measures Initial Eval  2/10 DN   23       5xSTS 18 73 sec        TUG  sec        10 meter  m/s 0 51 m/s w/ " quad cane       ZAVALETA /56        FGA /30        DGI /24        MiniBEST /28        PASS /36        6MWT  ft        CB&M /96

## 2023-04-05 NOTE — PROGRESS NOTES
Daily Note     Today's date: 2023  Patient name: Leisa Garces  : 1955  MRN: [id number]  Referring provider: Lamar Dodd MD  Dx:   Encounter Diagnosis     ICD-10-CM    1  Cerebrovascular accident (CVA), unspecified mechanism ([facility] [address] )  I63 9       2  Weakness  R53 1                      Subjective: Pt reports no changes since last session  Objective: See treatment diary below  NMRE:  -Shoulder flexion with 2lb therabar and red theraband to provide proprioceptive input to R UE  Focus on motor planning, improving proximal control R UE, NMR R UE    -PNF D1 and D2 flexion patterns with [place] glide 12x each direction  Focus on reaching across midline, dynamic reaching R UE, retraining movement patterns with R UE    -Large and medium squig removal from vertical surface anteriorly and placement into container with focus on dynamic reaching, functional grasp/release  Initiates release consistently with forearm supinated  Educated pt on normal movement patterns and tenodesis to initiate release  Requires assist to release ~15% of squigs  Drops 2    -Index retrieval laterally on R and placement into slot in container anteriorly  Focus on improving shoulder external rotation motor planning and ROM, functional grasp and release, lateral vs pincer grasp, FMC, dexterity  Requires significantly inc time and PRN assist from L UE for release of items      Assessment: Tolerated treatment well  Patient would benefit from continued OT services with focus on R UE strengthening, dexterity, motor planning and control, and [address] [place]  Plan: Continue POC and home exercise perform     HEP: (give daily checklists for pt to complete every week to inc compliance)  -Shoulder flexion in supine   -Shoulder elevation with 5 sec iso holds (in front of mirror)  -Scap retraction with 5 sec iso holds  -Lateral forearm pushups with stabilizing R hand with L UE  -Elbow flexion  -Mass finger flexion/extension with AAROM PRN

## 2023-04-06 ENCOUNTER — APPOINTMENT (OUTPATIENT)
Dept: OCCUPATIONAL THERAPY | Facility: CLINIC | Age: 68
End: 2023-04-06

## 2023-04-07 ENCOUNTER — OFFICE VISIT (OUTPATIENT)
Dept: PHYSICAL THERAPY | Facility: CLINIC | Age: 68
End: 2023-04-07

## 2023-04-07 DIAGNOSIS — M54.16 LUMBAR RADICULOPATHY: ICD-10-CM

## 2023-04-07 DIAGNOSIS — I63.9 CEREBROVASCULAR ACCIDENT (CVA), UNSPECIFIED MECHANISM (HCC): ICD-10-CM

## 2023-04-07 DIAGNOSIS — R53.1 WEAKNESS: Primary | ICD-10-CM

## 2023-04-07 NOTE — PROGRESS NOTES
"Daily Note     POC expires   5/10/23                   Visit/Unit Tracking  3/9                                              Today's date: 2023  Patient name: Rinku Downing  : 1955  MRN: [id number]  Referring provider: Allie rGimes MD  Dx:   Encounter Diagnosis     ICD-10-CM    1  Weakness  R53 1       2  Cerebrovascular accident (CVA), unspecified mechanism ([facility] [address] )  I63 9       3  Lumbar radiculopathy  M54 16                      Subjective: Patient presents to PT with SPC and no new changes, complaints or falls  Objective: See treatment diary below    Vitals:  - HR remained between 76 - 128 bpm t/o session   HIGT target HR: 108 - 131 bpm  Pre 96%, 74 bpm    NMR:    - STS 2x12 w/ foam on chair and balance pod L foot; 0-1 UE pushoff  - Step taps: 2x20 reps w/ 10# R ankle weight, 8\" step       - Treadmill w solostep:  (HR 74-84 bpm)        -7 minutes, @ 1 2-1 3 mph, 1-3% grade (4 mins in) 10# R ankle weight   Overground walking 10# R ankle weight: 283 ft (Ramp included) + AD, 1 cycle        -3 minutes, @ 1 3-1 4 mph, 3% incline 10# R ankle weight  Overground walking 10# R ankle weight: 283 ft (Ramp included) + AD, 1 cycle      Assessment: Patient tolerated treatment session well today with continued focus on brianna intensity training to promote neuroplastsity following CVA  Patient continues to be challenged with step length and gait speed, however with increased speed minor improvements with step length  Continued with treadmill training at incline to further encourage improved hip and knee flexion in RLE for improved clearance  He will continue to benefit from continued skilled PT to focus on mobility and gait deficits to improve function and safety at home and in his community  Plan: Continue per plan of care            Outcome Measures Initial Eval  2/10 DN   23       5xSTS 18 73 sec        TUG  sec        10 meter  m/s 0 51 m/s w/ quad cane       ZAVALETA /56        FGA /30        DG /   " MiniBEST /28        PASS /36        6MWT  ft        CB&M /96

## 2023-04-25 ENCOUNTER — OFFICE VISIT (OUTPATIENT)
Dept: OCCUPATIONAL THERAPY | Facility: CLINIC | Age: 68
End: 2023-04-25

## 2023-04-25 ENCOUNTER — OFFICE VISIT (OUTPATIENT)
Dept: PHYSICAL THERAPY | Facility: CLINIC | Age: 68
End: 2023-04-25

## 2023-04-25 DIAGNOSIS — I63.9 CEREBROVASCULAR ACCIDENT (CVA), UNSPECIFIED MECHANISM (HCC): ICD-10-CM

## 2023-04-25 DIAGNOSIS — R53.1 WEAKNESS: ICD-10-CM

## 2023-04-25 DIAGNOSIS — M54.16 LUMBAR RADICULOPATHY: ICD-10-CM

## 2023-04-25 DIAGNOSIS — R53.1 WEAKNESS: Primary | ICD-10-CM

## 2023-04-25 DIAGNOSIS — I63.9 CEREBROVASCULAR ACCIDENT (CVA), UNSPECIFIED MECHANISM (HCC): Primary | ICD-10-CM

## 2023-04-25 NOTE — PROGRESS NOTES
Daily Note     Today's date: 2023  Patient name: Henry Bowling  : 1955  MRN: [id number]  Referring provider: Sunny Mcintyre MD  Dx:   Encounter Diagnosis     ICD-10-CM    1  Weakness  R53 1       2  Cerebrovascular accident (CVA), unspecified mechanism ([facility] [address] )  I63 9                      Subjective: Pt reports no changes since last session  Objective: See treatment diary below  NMRE:  -UBE with FES to R UE triceps and pedaling with only R UE   50x with focus on motor planning R UE, elbow ext/flexion AROM, maintaining effective grasp R UE    -Cove/peg placement on board anteriorly with FES wrist and digit extensors  Focus on R UE kinesthesia, proprioception, pincer grasp, [address] [place], dexterity  Knocks over ~10 items    Assessment: Tolerated treatment well  Patient would benefit from continued OT services with focus on R UE strengthening, dexterity, motor planning and control, and [address]  Plan: Continue POC and home exercise perform     HEP: (give daily checklists for pt to complete every week to inc compliance)  -Shoulder flexion in supine   -Shoulder elevation with 5 sec iso holds (in front of mirror)  -Scap retraction with 5 sec iso holds  -Lateral forearm pushups with stabilizing R hand with L UE  -Elbow flexion  -Mass finger flexion/extension with AAROM PRN

## 2023-04-25 NOTE — PROGRESS NOTES
"Daily Note     POC expires   5/10/23                   Visit/Unit Tracking  3/9                                              Today's date: 2023  Patient name: Ahmet Nguyen  : 1955  MRN: [id number]  Referring provider: Ant Jacobsen MD  Dx:   Encounter Diagnosis     ICD-10-CM    1  Cerebrovascular accident (CVA), unspecified mechanism ([facility] [address] )  I63 9       2  Weakness  R53 1       3  Lumbar radiculopathy  M54 16                      Subjective: Patient reports to skilled outpatient PT with [facility] with no new changes, complaints, or falls  Objective: See treatment diary below    Vitals  HR: 86 BPM  SPO2: 99%  BP:     NMR:    - STS 2x20 w/ foam on chair and 2\" step under L foot; 0 UE pushoff  - Stepping up/over  stairs: 4 cycles, 10# R ankle weight   - Treadmill w solostep:        - 10 minutes, @ 1 3 mph, 3% grade, 10# R ankle weight , RPE 18/20  - Overground walking 10# R ankle weight w/ PT anterior propulsion: 250 ft + no AD, 2 cycles      Assessment: Patient able to tolerate treatment session well today with continued focus on high intensity training to promote neuroplasticity following CVA  Most difficulty when performing dynamic activities in lateral direction resulting in occasional LoB and good ability to self correct via stepping strategy or UE support  He will continue to benefit from continued skilled PT to focus on mobility and gait deficits to improve function and safety at home and in his community  Plan: Continue per plan of care              Outcome Measures Initial Eval  2/10/2023 DN  2023 PN  3/14/2023 RE  23     5xSTS 18 73 sec  16 33 sec;   1 UE 11 6 sec, 1 UE      TUG  sec  16 30 sec w/ quad cane    14 38 sec NO AD 14 0 sec w/ quad cane   12 0 sec NO AD      10 meter  m/s 0 51 m/s with quad cane 0 78 m/s w/ quad cane 0  81 m/s with quad cane     ZAVALETA /56  38/56 46/56     6MWT  ft  700 ft w/ quad cane 700 ft w/ quad cane      FGA    NO AD  NO AD             "

## 2023-04-27 ENCOUNTER — OFFICE VISIT (OUTPATIENT)
Dept: PHYSICAL THERAPY | Facility: CLINIC | Age: 68
End: 2023-04-27

## 2023-04-27 ENCOUNTER — OFFICE VISIT (OUTPATIENT)
Dept: OCCUPATIONAL THERAPY | Facility: CLINIC | Age: 68
End: 2023-04-27

## 2023-04-27 DIAGNOSIS — I63.9 CEREBROVASCULAR ACCIDENT (CVA), UNSPECIFIED MECHANISM (HCC): ICD-10-CM

## 2023-04-27 DIAGNOSIS — R53.1 WEAKNESS: Primary | ICD-10-CM

## 2023-04-27 DIAGNOSIS — M54.16 LUMBAR RADICULOPATHY: ICD-10-CM

## 2023-04-27 DIAGNOSIS — I63.9 CEREBROVASCULAR ACCIDENT (CVA), UNSPECIFIED MECHANISM (HCC): Primary | ICD-10-CM

## 2023-04-27 DIAGNOSIS — R53.1 WEAKNESS: ICD-10-CM

## 2023-04-27 NOTE — PROGRESS NOTES
Daily Note     Today's date: 2023  Patient name: Henry Bowling  : 1955  MRN: [id number]  Referring provider: Sunny Mcintyre MD  Dx:   Encounter Diagnosis     ICD-10-CM    1  Weakness  R53 1       2  Cerebrovascular accident (CVA), unspecified mechanism ([facility] [address] )  I63 9           Start Time: 1500  Stop Time: 1545  Total time in clinic (min): 45 minutes    Subjective: Pt reports no changes since last session  Objective: See treatment diary below  NMRE:  -Instrument assisted soft tissue management with use of graston R pronators, digit flexors while completing AAROM  Focus on spasticity mgmt in preparation for functional grasp/release and reaching and sensory retraining    -Pt performed wrist flexion/extension with forearm in neutral, forearm pronation/supination with 3 second isometric holds, digit flexion/extension with use of mirror therapy to transmit visual information through the observation of movements performed by unaffected arm/hand, creating an interaction between the visual, proprioceptive and motor inputs  Strategy to take advantage of visual feedback over somatosensory/proprioceptive feedback concerning limb positioning to initiate movement of affected limb     -Retrieval of shane pieces 45* to R and stacking in piles of 3 on table anteriorly  Focus on shoulder external/internal rotation, [address], pincer grasp, dexterity, R UE motor planning  Drops 4 pieces     Assessment: Tolerated treatment well  Patient would benefit from continued OT services with focus on R UE strengthening, dexterity, motor planning and control, and [address]  Plan: Continue POC and home exercise perform     HEP: (give daily checklists for pt to complete every week to inc compliance)  -Shoulder flexion in supine   -Shoulder elevation with 5 sec iso holds (in front of mirror)  -Scap retraction with 5 sec iso holds  -Lateral forearm pushups with stabilizing R hand with L UE  -Elbow flexion  -Mass finger flexion/extension with SERGIO GREER

## 2023-04-27 NOTE — PROGRESS NOTES
"Daily Note     POC expires   5/10/23                   Visit/Unit Tracking  3/9                                              Today's date: 2023  Patient name: Ana Jaimes  : 1955  MRN: [id number]  Referring provider: Eddie Harp MD  Dx:   Encounter Diagnosis     ICD-10-CM    1  Cerebrovascular accident (CVA), unspecified mechanism ([facility] [address] )  I63 9       2  Weakness  R53 1       3  Lumbar radiculopathy  M54 16                      Subjective: Patient reports to skilled outpatient PT with [facility] with no new changes, complaints, or falls  Objective: See treatment diary below    Vitals  HR: 63 BPM  SPO2: 96%  BP: 126/70    NMR:    - STS 2x20 w/ foam on chair and balance pod under L foot; 0 UE pushoff   - STS w/ FWD hurdles w/ 20# ankle weights: 4 laps x 2 sets  - FWD cone pickup/replace w/ posterior resistance: 10x 3 sets, 20# ankle weight  - Overground walking 10# R ankle weight : 250 ft + no AD, 1 cycles      Assessment: Patient able to tolerate treatment session well today with continued focus on high intensity training to promote neuroplasticity following CVA  With 20# ankle weights, patient displays difficulty achieving 6\" foot clearance to navigate hurdles  With balance pod under L foot, patient displays hip retraction when standing causing posterior LOB during STS despite verbal cueing to improve glute contraction  He will continue to benefit from continued skilled PT to focus on mobility and gait deficits to improve function and safety at home and in his community  Plan: Continue per plan of care              Outcome Measures Initial Eval  2/10/2023 DN  2023 PN  3/14/2023 RE  23     5xSTS 18 73 sec  16 33 sec;   1 UE 11 6 sec, 1 UE      TUG  sec  16 30 sec w/ quad cane    14 38 sec NO AD 14 0 sec w/ quad cane   12 0 sec NO AD      10 meter  m/s 0 51 m/s with quad cane 0 78 m/s w/ quad cane 0  81 m/s with quad cane     ZAVALETA /56  38/56 46/56     6MWT  ft  700 ft w/ quad cane 700 ft w/ " quad cane      FGA   9/30 NO AD 11/30 NO AD

## 2023-05-02 ENCOUNTER — OFFICE VISIT (OUTPATIENT)
Dept: PHYSICAL THERAPY | Facility: CLINIC | Age: 68
End: 2023-05-02

## 2023-05-02 ENCOUNTER — OFFICE VISIT (OUTPATIENT)
Dept: OCCUPATIONAL THERAPY | Facility: CLINIC | Age: 68
End: 2023-05-02

## 2023-05-02 DIAGNOSIS — M54.16 LUMBAR RADICULOPATHY: ICD-10-CM

## 2023-05-02 DIAGNOSIS — R53.1 WEAKNESS: Primary | ICD-10-CM

## 2023-05-02 DIAGNOSIS — I63.9 CEREBROVASCULAR ACCIDENT (CVA), UNSPECIFIED MECHANISM (HCC): ICD-10-CM

## 2023-05-02 NOTE — PROGRESS NOTES
Daily Note     Today's date: 2023  Patient name: Drea Castro  : 1955  MRN: [id number]  Referring provider: Smiat Wilson MD  Dx:   Encounter Diagnosis     ICD-10-CM    1  Weakness  R53 1       2  Cerebrovascular accident (CVA), unspecified mechanism ([facility] [address] )  I63 9                      Subjective: Pt reports no changes since last session  Objective: See treatment diary below  NMRE:  -Retrieval of bananagram tiles on table top anteriorly and thread into slot in container on table 90* to R  Focus on pincer grasp, [address] [place], dexterity, shoulder internal/external rotation, trunk rotation  Requires significantly inc time for all aspects with use of R UE    -Retrieval of rings and placement onto labeled cones in all planes  Focus on lateral pinch and release, dynamic reaching in a variety of planes, trunk rotation and dynamic standing balance  Requires cues to decrease compensatory movements to facilitate inc focus on elbow flexion/extension    Assessment: Tolerated treatment well  Patient would benefit from continued OT services with focus on R UE strengthening, dexterity, motor planning and control, and [address] [place]  Plan: Continue POC and home exercise perform     HEP: (give daily checklists for pt to complete every week to inc compliance)  -Shoulder flexion in supine   -Shoulder elevation with 5 sec iso holds (in front of mirror)  -Scap retraction with 5 sec iso holds  -Lateral forearm pushups with stabilizing R hand with L UE  -Elbow flexion  -Mass finger flexion/extension with AAROM PRN

## 2023-05-02 NOTE — PROGRESS NOTES
Daily Note     POC expires   5/10/23                   Visit/Unit Tracking  3/9                                              Today's date: 2023  Patient name: Drea Castro  : 1955  MRN: [id number]  Referring provider: Smita Wilson MD  Dx:   Encounter Diagnosis     ICD-10-CM    1  Weakness  R53 1       2  Cerebrovascular accident (CVA), unspecified mechanism ([facility] [address] )  I63 9       3  Lumbar radiculopathy  M54 16                      Subjective: Patient reports to skilled outpatient PT with [facility] with no new changes, complaints, or falls  Objective: See treatment diary below    Vitals  HR: 61 BPM  SPO2: 97%  BP: 130/78    NMR:    - STS 2x20 w/ foam on chair and balance pod under L foot; 1 UE pushoff   - Overground walking 10# R ankle weight : 250 ft + no AD, 1 cycles  - Overground walking 20# R ankle weight : 250 ft + no AD, 3 cycles       Assessment: Patient able to tolerate treatment session well today with continued focus on high intensity training to promote neuroplasticity following CVA  With 20# ankle weights, provided vc for high knees to improve foot clearance and increased gait speed to improve intensity  Following last set, patient with multiple R knee buckling, possibly due to fatigue  With balance pod under L foot, patient displays hip retraction when standing causing posterior LOB during STS despite verbal cueing to improve glute contraction  He also demonstrates poor eccentric control from stand > sit and limited ability to stand without UE assist  He will continue to benefit from continued skilled PT to focus on mobility and gait deficits to improve function and safety at home and in his community  Plan: Continue per plan of care              Outcome Measures Initial Eval  2/10/2023 DN  2023 PN  3/14/2023 RE  23     5xSTS 18 73 sec  16 33 sec;   1 UE 11 6 sec, 1 UE      TUG  sec  16 30 sec w/ quad cane    14 38 sec NO AD 14 0 sec w/ quad cane   12 0 sec NO AD      10 meter m/s 0 51 m/s with quad cane 0 78 m/s w/ quad cane 0  81 m/s with quad cane     ZAVALETA /56  38/56 46/56     6MWT  ft  700 ft w/ quad cane 700 ft w/ quad cane      FGA   9/30 NO AD 11/30 NO AD

## 2023-05-04 ENCOUNTER — OFFICE VISIT (OUTPATIENT)
Dept: PHYSICAL THERAPY | Facility: CLINIC | Age: 68
End: 2023-05-04

## 2023-05-04 ENCOUNTER — OFFICE VISIT (OUTPATIENT)
Dept: OCCUPATIONAL THERAPY | Facility: CLINIC | Age: 68
End: 2023-05-04

## 2023-05-04 DIAGNOSIS — I63.9 CEREBROVASCULAR ACCIDENT (CVA), UNSPECIFIED MECHANISM (HCC): ICD-10-CM

## 2023-05-04 DIAGNOSIS — R53.1 WEAKNESS: Primary | ICD-10-CM

## 2023-05-04 DIAGNOSIS — R53.1 WEAKNESS: ICD-10-CM

## 2023-05-04 DIAGNOSIS — M54.16 LUMBAR RADICULOPATHY: ICD-10-CM

## 2023-05-04 DIAGNOSIS — I63.9 CEREBROVASCULAR ACCIDENT (CVA), UNSPECIFIED MECHANISM (HCC): Primary | ICD-10-CM

## 2023-05-04 NOTE — PROGRESS NOTES
Daily Note     Today's date: 2023  Patient name: Anita Yusuf  : 1955  MRN: [id number]  Referring provider: Jf Barnes MD  Dx:   Encounter Diagnosis     ICD-10-CM    1  Weakness  R53 1       2  Cerebrovascular accident (CVA), unspecified mechanism ([facility] [address] )  I63 9                      Subjective: Pt reports no changes since last session  Objective: See treatment diary below  NMRE:  -Large squig retrieval from vertical surface anteriorly from waist to shoulder height with focus on effective grasp/release, dynamic reaching, R UE coordination and motor planning  Requires significantly inc time, limited by spasticity and coordination deficits    -Scooping beans with cup activity performed with pt scooping and transferring beads via cup to basin  Focus on improving motor planning and coordination or forearm pronation/supination, maintaining effective cylindrical grasp  Difficulty with applying appropriate pressure to cup 2* spasticity  Modified from plastic cup to ceramic mug    -Stacking pegs activity performed with focus on rotation of items in hand, [address], coordination, motor planning, digit isolation  Significant difficulty with rotation aspect of task and requires compensation    Assessment: Tolerated treatment well  Patient would benefit from continued OT services with focus on R UE strengthening, dexterity, motor planning and control, and [address]  Plan: Continue POC and home exercise perform     HEP: (give daily checklists for pt to complete every week to inc compliance)  -Shoulder flexion in supine   -Shoulder elevation with 5 sec iso holds (in front of mirror)  -Scap retraction with 5 sec iso holds  -Lateral forearm pushups with stabilizing R hand with L UE  -Elbow flexion  -Mass finger flexion/extension with AAROM PRN

## 2023-05-04 NOTE — PROGRESS NOTES
Daily Note     POC expires   5/10/23                   Visit/Unit Tracking  3/9                                              Today's date: 2023  Patient name: Amalia Capellan  : 1955  MRN: [id number]  Referring provider: Tabby Ibarra MD  Dx:   Encounter Diagnosis     ICD-10-CM    1  Cerebrovascular accident (CVA), unspecified mechanism ([facility] [address] )  I63 9       2  Weakness  R53 1       3  Lumbar radiculopathy  M54 16                      Subjective: Patient reports to skilled outpatient PT with [facility] with no new changes, complaints, or falls  Objective: See treatment diary below    Vitals  HR: 56 BPM  SPO2: 99%  BP: 134/82    NMR:    - STS 2x20 w/ foam on chair and balance pod under L foot; 1 UE pushoff   - Treadmill w solostep:   - Warm up: 1 0 mph, 0% grade, 10# R ankle weight   - 10 minutes, @ 1 3 mph, 3% grade, 10# R ankle weight , RPE 20/20  - Outdoor ambulation w/ 20# on RLE, no AD: 200', RPE 15/20    Assessment: Patient able to tolerate treatment session well today with continued focus on high intensity training to promote neuroplasticity following CVA  With balance pod under L foot, patient displays hip retraction when standing causing posterior LOB during STS despite verbal cueing to improve glute contraction  He also demonstrates poor eccentric control from stand > sit and limited ability to stand without UE assist  Patient reported significant fatigue with TM ambulation - may need to modify intensity based on patient's activity tolerance  He will continue to benefit from continued skilled PT to focus on mobility and gait deficits to improve function and safety at home and in his community  Plan: Continue per plan of care              Outcome Measures Initial Eval  2/10/2023 DN  2023 PN  3/14/2023 RE  23     5xSTS 18 73 sec  16 33 sec;   1 UE 11 6 sec, 1 UE      TUG  sec  16 30 sec w/ quad cane    14 38 sec NO AD 14 0 sec w/ quad cane   12 0 sec NO AD      10 meter  m/s 0 51 m/s with quad cane 0 78 m/s w/ quad cane 0  81 m/s with quad cane     ZAVALETA /56  38/56 46/56     6MWT  ft  700 ft w/ quad cane 700 ft w/ quad cane      FGA   9/30 NO AD 11/30 NO AD

## 2023-05-09 ENCOUNTER — OFFICE VISIT (OUTPATIENT)
Dept: OCCUPATIONAL THERAPY | Facility: CLINIC | Age: 68
End: 2023-05-09

## 2023-05-09 ENCOUNTER — OFFICE VISIT (OUTPATIENT)
Dept: PHYSICAL THERAPY | Facility: CLINIC | Age: 68
End: 2023-05-09

## 2023-05-09 DIAGNOSIS — I63.9 CEREBROVASCULAR ACCIDENT (CVA), UNSPECIFIED MECHANISM (HCC): ICD-10-CM

## 2023-05-09 DIAGNOSIS — R53.1 WEAKNESS: ICD-10-CM

## 2023-05-09 DIAGNOSIS — I63.9 CEREBROVASCULAR ACCIDENT (CVA), UNSPECIFIED MECHANISM (HCC): Primary | ICD-10-CM

## 2023-05-09 DIAGNOSIS — R53.1 WEAKNESS: Primary | ICD-10-CM

## 2023-05-09 DIAGNOSIS — M54.16 LUMBAR RADICULOPATHY: ICD-10-CM

## 2023-05-09 NOTE — PROGRESS NOTES
Daily Note     POC expires   5/10/23                   Visit/Unit Tracking  3/9                                              Today's date: 2023  Patient name: Radha Castanon  : 1955  MRN: [id number]  Referring provider: Robert Mariscal MD  Dx:   Encounter Diagnosis     ICD-10-CM    1  Cerebrovascular accident (CVA), unspecified mechanism ([facility] [address] )  I63 9       2  Weakness  R53 1       3  Lumbar radiculopathy  M54 16                      Subjective: Patient reports to skilled outpatient PT with [facility] with no new changes, complaints, or falls  Objective: See treatment diary below    Vitals  HR: 63 BPM  SPO2: 98%  BP: 154/84    NMR:    - STS 2x20 w/ foam on chair and balance pod under L foot; 1 UE pushoff   - Treadmill w solostep:   - Warm up: 1 0 mph, 0% grade, 10# R ankle weight   - 10 minutes, @ 1 3 mph, 2-3% grade, 10# R ankle weight , RPE 13/20    3% grade for last 1 5 min  - Ambulation w/ 10# on RLE w/ posterior resistance (red), no AD: 2 x 300'    Assessment: Patient able to tolerate treatment session well today with continued focus on high intensity training to promote neuroplasticity following CVA  With balance pod under L foot, patient displays decreased hip retraction when standing  He continues to demonstrate poor eccentric control from stand > sit and limited ability to stand without UE assist  Due to patient reports of significant fatigue with TM ambulation last session, regressed intensity today to patient's activity tolerance  He will continue to benefit from continued skilled PT to focus on mobility and gait deficits to improve function and safety at home and in his community  Plan: Continue per plan of care              Outcome Measures Initial Eval  2/10/2023 DN  2023 PN  3/14/2023 RE  23     5xSTS 18 73 sec  16 33 sec;   1 UE 11 6 sec, 1 UE      TUG  sec  16 30 sec w/ quad cane    14 38 sec NO AD 14 0 sec w/ quad cane   12 0 sec NO AD      10 meter  m/s 0 51 m/s with quad cane 0 78 m/s w/ quad cane 0  81 m/s with quad cane     ZAVALETA /56  38/56 46/56     6MWT  ft  700 ft w/ quad cane 700 ft w/ quad cane      FGA   9/30 NO AD 11/30 NO AD

## 2023-05-09 NOTE — PROGRESS NOTES
Daily Note     Today's date: 2023  Patient name: Luke Prasad  : 1955  MRN: [id number]  Referring provider: Mikey Da Silva MD  Dx:   Encounter Diagnosis     ICD-10-CM    1  Weakness  R53 1       2  Cerebrovascular accident (CVA), unspecified mechanism ([facility] [address] )  I63 9                      Subjective: Pt reports no changes since last session  Objective: See treatment diary below  NMRE:  Performed weight bearing task of lateral weight shifting for 5-10 seconds and with FES on tricep lateral pushups for weight bearing for proprioceptive feedback for motor rcovery  Performed AAROM of PNF D 2 patterns at shoulder x 20 reps fcusing on RUE coordination  Performed with FES on wrist extensors of PNF D2 patterns using bean bags focusing on UE coordination, grasp/release, RUE coordination and motor planning- performed for 8-9 minutes   Performed with FES on wrist extensors while completing shoulder flexion  Using large squigs focusing on UE coordination, grasp/release, RUE coordination and motor planning- performed for 7-8 minutes       Assessment: Tolerated treatment well  Pt demonstrating increased spasticity in beginning of session however post FES and weight bearing, pt demonstrating decreased spasticity  Patient would benefit from continued OT services with focus on R UE strengthening, dexterity, motor planning and control, and [address]  Plan: Continue POC and home exercise perform     HEP: (give daily checklists for pt to complete every week to inc compliance)  -Shoulder flexion in supine   -Shoulder elevation with 5 sec iso holds (in front of mirror)  -Scap retraction with 5 sec iso holds  -Lateral forearm pushups with stabilizing R hand with L UE  -Elbow flexion  -Mass finger flexion/extension with AAROM PRN

## 2023-05-11 ENCOUNTER — APPOINTMENT (OUTPATIENT)
Dept: PHYSICAL THERAPY | Facility: CLINIC | Age: 68
End: 2023-05-11
Payer: MEDICARE

## 2023-05-11 ENCOUNTER — APPOINTMENT (OUTPATIENT)
Dept: OCCUPATIONAL THERAPY | Facility: CLINIC | Age: 68
End: 2023-05-11
Payer: MEDICARE

## 2023-05-16 ENCOUNTER — APPOINTMENT (OUTPATIENT)
Dept: OCCUPATIONAL THERAPY | Facility: CLINIC | Age: 68
End: 2023-05-16
Payer: MEDICARE

## 2023-05-16 ENCOUNTER — APPOINTMENT (OUTPATIENT)
Dept: PHYSICAL THERAPY | Facility: CLINIC | Age: 68
End: 2023-05-16
Payer: MEDICARE

## 2023-05-18 ENCOUNTER — OFFICE VISIT (OUTPATIENT)
Dept: PHYSICAL THERAPY | Facility: CLINIC | Age: 68
End: 2023-05-18

## 2023-05-18 ENCOUNTER — EVALUATION (OUTPATIENT)
Dept: OCCUPATIONAL THERAPY | Facility: CLINIC | Age: 68
End: 2023-05-18

## 2023-05-18 DIAGNOSIS — M54.16 LUMBAR RADICULOPATHY: ICD-10-CM

## 2023-05-18 DIAGNOSIS — I63.9 CEREBROVASCULAR ACCIDENT (CVA), UNSPECIFIED MECHANISM (HCC): Primary | ICD-10-CM

## 2023-05-18 DIAGNOSIS — R53.1 WEAKNESS: ICD-10-CM

## 2023-05-18 NOTE — PROGRESS NOTES
PT Progress Note         POC expires   5/10/23   7/18/23                       Today's date: 2023  Patient name: Marla Erwin  : 1955  MRN: [id number]  Referring provider: Malika Barker MD  Dx:   Encounter Diagnosis     ICD-10-CM    1  Cerebrovascular accident (CVA), unspecified mechanism ([facility] [address] )  I63 9       2  Weakness  R53 1       3  Lumbar radiculopathy  M54 16             Assessment  Assessment details: Patient is a 74 y o  Male who has been presenting to skilled outpatient PT with low back pain on right side secondary to CVA after affects  Patient has experienced complete resolution of his back pain symptoms and has had adjustments to his brace from orthotist  Since resolution of back pain, sessions have utilized principles of high intensity gait training per current evidence to optimize neuroplastic changes with goal of improving gait and balance  Since his most recent progress note, patient displays improvements with his FGA and ZAVALETA scores, suggesting overall improvement with his static and dynamic balance  Despite his scoring improvements, he remains below age related normative values with FGA and at high risk of falls  His TUG, 5xSTS, and 10 MWT all maintain similar values, with minor improvements/regressions not enough to reflect MDC  Based on plateau in progress with majority of objective measures, plan to continue for 2 more weeks to establish comprehensive HEP  Patient will continue to benefit from skilled PT services continue to optimize gait with LRAD, maximize his overall functional mobility, and provide comprehensive HEP to maximize gains post-treatment         Impairments: Abnormal coordination, Abnormal gait, Abnormal muscle tone, Abnormal or restricted ROM, Activity intolerance, Impaired balance, Impaired physical strength, Lacks appropriate HEP, Poor posture, Poor body mechanics, Pain with function, Weight-bearing intolerance, Abnormal movement and Abnormal muscle firing  Understanding of Dx/Px/POC: Good  Prognosis: Good      Patient verbalized understanding of POC  Please contact me if you have any questions or recommendations  Thank you for the referral and the opportunity to share in [address] care          Plan  Planned modality interventions: TENS and Thermotherapy: Hydrocollator Packs  Planned therapy interventions: Abdominal trunk stabilization, ADL training, Balance, Balance/WB training, Breathing training, Body mechanics training, Coordination, Functional ROM exercises, Gait training, HEP, Neuromuscular re-education, Patient education, Postural training, Strengthening, Stretching, Therapeutic activities, Therapeutic exercises, Therapeutic training, Transfer training and Activity modification  Frequency: 2-3x/wk  Plan of Care beginning date: 4/18/2023  Plan of Care expiration date: 3 months - 7/18/2023  Treatment plan discussed with: Patient         Goals  Short Term Goals (4 weeks):    - Patient will complete TUG test - MET  - patient will report reduction in low back pain by 50% - MET  - Patient will complete FGA test - MET  - Patient will complete 6 MWT - MET  - Patient will be independent in basic HEP 2-3 weeks - MET  - Patient will improve 5xSTS score by 2 3 seconds from 18 73 seconds to 15 seconds to promote improved LE functional strength needed for ADLs - MET  - Patient will complete components of CB&M to promote agility necessary for sports related tasks (d/c 3/14/2023)  - patient will complete 10 MWT - MET    Long Term Goals (12 weeks):  - Patient will be independent in a comprehensive home exercise program - PROGRESSING  - Patient will improve gait speed to 1 0 m/s with LRAD - PROGRESSING  - Patient will improve scoring on FGA to 23/30 or higher indicating lower risk for falls with dynamic tasks - PROGRESSING  - Patient will be able to demonstrate HT in gait without veering - PROGRESSING   - Patient will improve 6 Minute Walk Test score to  800 feet to promote improved cardiovascular endurance - PROGRESING   - Patient will report 50% reduction in near falls in order to improve safety with functional tasks and reduce his risk for falls - PROGRESSING   - Patient will report going on walks at least 3 days per week to promote independence and improved cardiovascular endurance - PROGRESSING   - Patient will be able to ascend/descend stairs reciprocally without UE assist to promote independence and safety with ADLs - PROGRESSING   - Patient will report 50% reduction in near falls when ambulating on uneven terrain - PROGRESSING   - Patient will report no low back pain radiating down leg/thigh - MET       Cut off score    All date taken from APTA Neuro Section or Rehab Measures      Lal:  Erin et al , 2018  Nataliia [employer] [address]: 2 7 pts    Mary Ann et al , 2011  Cut-off score: 45/56    Chronic CVA  < 44/56 high risk for falls (Erin et al , 2018)  < 47 5/56 slow walker status (Alexandre ramsey al , 2011) 5xSTS: Leticia ramsey al 2010  Nataliia [address]: 2 3 sec  Age Norms:  60-69: 11 1 sec  70-79: 12 6 sec  80-89: 14 8 sec    Leticia 2010, Chronic Stroke  Chronic CVA: 12 sec   TUG  Zandra , 2005  MDC: 2 9 sec    Cut off score:  >13 5 sec community dwelling adults  >32 2 frail elderly  <20 I for basic transfers  >30 dependent on transfers 10 Meter Walk Test:   Melissa garcia , 2006  Small meaningful change: 0 06 m/s  Substantial meaningful change: 0 14 m/s  MCID: 0 16 m/s    < 0 4 m/s household ambulators  0 4 - 0 8 m/s limited community ambulators  > 0 8 m/s community ambulators   FGA: Jazmine et al , 2010  MCID: 4 2 pts  Geriatrics/community < 22/30 fall risk  Geriatrics/community < 20/30 unexplained falls DGI  MDC: vestibular - 4 pts  MDC: geriatric/community - 3 pts  Falls risk <19/24   6 Minute Walk Test  Rabia et al , 2006  MDC: 60 98 m (200 01 ft)    Richard Goodman, Geovanna, & Cut off, 2012  MCID: 34 4 m    Age Norms  60-69: M - 1876 ft   F - 1765 ft  70-79: M - 1729 ft   F - 1545 ft  80-89: M - 1368 ft   F - 1286 ft Modified Levy  0: No increase in tone  1: Catch and release or min resistance at end range  1+: Catch f/b min resistance throughout remainder (< half ROM)  2: Easily moved, but more marked tone throughout most ROM  3: Significant tone, PROM difficult  4: Rigid   MiniBest: Katerine et al , 2013  CVA < 17 5 fall risk Pass (Acute CVA)  MDC: 1 8 points (acute), 3 2 points (chronic)         Subjective    History of Present Illness  Mechanism of injury: Pt reports CVA 3 years ago, splits time between [place] and [place]  Notes getting a new brace PLF but notes low back has been bothering him more now due to walking  Notes walking on toes and is getting a shin splint like issue  Updated (3/14/2023): Patient reports overall satisfaction with PT services thus far  States his back pain is largely resolved and he has been wearing his old brace  He wants to continue to work on strength, balance, and gait  He is waiting to hear back regarding lead therapist reaching out to orthotist regarding his old brace's fit  Update 23: Patient reports he had adjustments on his brace at [facility] yesterday, and his orthotist will reach out to lead therapist to discuss  He feels his walking has improved since starting PT  Update 2023: Patient returns to treatment after 1 week off in [place], arrives to treatment with quad cane   He states that he is able to get around well and feels like his activity levels are improving since starting PT    Primary AD: [facility]  Assist level at home: spouse   WC usage: none   PLOF: [facility]     Pain  Current pain ratin/10  At best pain ratin/10  At worst pain ratin/10  Location: R   Aggravating factors: none     Social Support  Steps to enter house: none   Stairs in house: none   Lives in: [place]   Lives with: spouse     Employment status: retired   Hand dominance: left hand now     Treatments  [facility]  Current treatment: None   Diagnostic Testing: none       Objective       LE MMT  - R Hip Flexion: 3-/5   - L Hip Flexion: 4-/5  - R Hip Extension: 3-/5  - L Hip Extension: 4-/5  - R Hip Abduction: 4-/5  - L Hip abduction: 4-/5  - R Hip adduction: 4-/5  - L Hip adduction: 4-/5  - R Knee Extension: 4-/5  - L Knee Extension: 4-/5  - R Knee Flexion: 4-/5   - L Knee Flexion: 4-/5  - R Ankle DF: 2-/5   - L Ankle DF: 4/5  - R Ankle PF: 2-/5   - L Ankle PF: 4/5    Sensation  - Light touch: intact  - Deep pressure: intact     Coordination  - Dysdiadochokinesia: unable   - Alternating Toe Taps: decrease on right, limited active DF movement     Modified Levy  - R knee extension: 1 - catch and release or minimum resistance at end range  - L knee extension: 0 - no increase in tone  - R knee flexion: 1 - catch and release or minimum resistance at end range   - L knee flexion: 0 - no increase in tone  - R ankle DF: 3 - significant tone, PROM difficulty      - L ankle DF: 0 - no increase in tone  - R ankle inversion: 3 - significant tone, PROM difficulty     - L ankle inversion: 0 - no increase in tone  - R ankle eversion: 3 - significant tone, PROM difficulty     - L ankle eversion: 0 - no increase in tone    Clonus  - L: No  - R: No    Vision  - Glasses: No  - Abnormalities prior to CVA: No,     Neglect  - R sided: No  - L sided: No    Pusher's Syndrome  - R sided: No  - L sided: No        Outcome Measures Initial Eval  2/10/2023 DN  2/14/2023 PN  3/14/2023 RE  4/18/23 PN  5/18/2023    5xSTS 18 73 sec  16 33 sec;   1 UE 11 6 sec, 1 UE  12 8 sec, 1 UE    TUG  sec  16 30 sec w/ quad cane    14 38 sec NO AD 14 0 sec w/ quad cane   12 0 sec NO AD  13 25 sec w/ quad cane  11 09 sec no AD    10 meter  m/s 0 51 m/s with quad cane 0 78 m/s w/ quad cane 0  81 m/s with quad cane 0 72 m/s w/ quad cane    ZAVALETA /56  38/56 46/56 51/56    6MWT  ft  700 ft w/ quad cane 700 ft w/ quad cane  704 ft w/ quad cane    FGA   9/30 NO AD 11/30 NO AD  15/30             Precautions: Fall risk   Past Medical History:   Diagnosis Date   • Hyperlipidemia    • Hypertension Calli Bryant MD

## 2023-05-18 NOTE — PROGRESS NOTES
"Occupational Therapy Discharge    Today's Date: 2023  Patient Name: Radha Castanon  : 1955  MRN: [id number]  Referring Provider: Robert Mariscal MD  Dx: Cerebrovascular accident (CVA), unspecified mechanism ([facility] [address] ) [I63 9]      SKILLED ANALYSIS:  Pt presents to OP neuro OT re-evaluation after ~3 month POC with focus on R NMR  Pt is demonstrating significantly improved functional use of R UE and overall functional cognition since start of POC  His Fugl-Ritchie score improved from 23/ to 42/66, and his MoCA score improved from  to   Pt continues to be limited by the following deficits: R UE spasticity, FMC, dysmetria, motor planning deficits, limited R UE ROM  Pt achieved 4 additional OT goals  Recommend a break from OT services at this time, with planned return in 3-4 months  Discussed progress and recommendations with pt, and he is in agreement  PLAN OF CARE START: 23  PLAN OF CARE END: 23 D/C  FREQUENCY: 2x per week 12 weeks  PRECAUTIONS: Fall risk, standard, HTN      Subjective   Pt reports no major changes since [facility]  Reports can move fingers better  Mechanism of Injury  Pt dx with CVA in 2019 affecting R (dominant) side of body  Occupational Profile  Pt lives with his wife in a [facility]  Pt spends time at his house in [place] and [place] [id number] [place]  [facility]  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   Pt reports he is independent getting dressing except donning/doffing his jacket  Pt does not wear clothing with buttons or zippers  Pts wife reports she manages his medication and meal preps  Pt reports he does not use his R UE for eating  Pt reports one fall in the past six months at home in  [place] [facility]  Pt reports he does not use his R UE for ADLs or IADLs  Pt reports his R UE shoulder is always in pain  Pt uses a cane to walk and has a leg brace he wears on his R LE  Pts wife reports he has a R wrist brace to wear when sleeping but does not wear it         PATIENT GOAL: \"Work on my " "movement\"    HISTORY OF PRESENT ILLNESS:     Pt is a 79 y o  male who was referred to Occupational Therapy s/p  Cerebrovascular accident (CVA), unspecified mechanism ([facility] [address] ) [I63 9]  PMH:   Past Medical History:   Diagnosis Date   • Hyperlipidemia    • Hypertension        Past Surgical Hx: No past surgical history on file       Pain Levels: 0/10    Objective    Range of Motion:  AROM:   RUE   -  shoulder flexion: 70* (initially: 30%)  - elbow flexion 100% (initially: 80%)  -  elbow extension: 90% (previously 90%)  -   wrist flexion: 100%  -  wrist extension: 90%  LUE within normal limits     MMT:  R UE:    -shoulder abduction: 2+/5  -  shoulder flexion: 3-/5 (prior: 2/5)  - elbow flexion: 4+/5 (prior: 3/5)  -  elbow extension: 4+/5 (prior: 3-/5)  -   wrist flexion: 4/5 (prior: 3+/5)  -  wrist extension: 4/5 (3/5)      L UE 5/5 in all pivots     FUGYL JUNIOR ASSESSMENT   Performed fugyl junior assessment of the upper extremity to measure pt's motor impairment with upper extremity scores:  UPPER EXTREMITY SEATED: 23/36 (prior: 11/36)  WRIST: 8/10 (prior: 3/10)  HAND: 11/14 (prior: 8/14)  COORDINATION/SPEED: 0/6  OVERALL SCORE: 42/66 (prior: 23/66)     (clinical change= 5 points, severe impairment <19, and mild impairment is >50)      Further Observations in UE Assessment    Subluxation: 1/2 finger width in R glenohumeral joint    Mild scapular winging and elevation noted at rest    Spasticity Noted    MODIFIED YARELY SCALE:   Performed modified yarely scale to assess spasticity of R upper extremity:  Shoulder external rotators: subluxation  Shoulder internal rotators: Not assessed  Shoulder flexors: Not assessed  Elbow flexors: 1+ (initially: 2/4)  Elbow extensors: 1+ (previously 1/4)  Wrist flexors: 1/4 (initially: 1+/4)  Wrist extensors 1+/4 (initially 1/4)  Wrist pronators 2 (initially 3/4)  Wrist supinators: 0/4  Finger flexors: 2/4  Finger extensors: 2/4(initially 3/4)   0: No increase in muscle tone  1: " Slight increase in muscle tone, manifested by a catch and release or by minimal resistance at the end of the range of motion when the affected part(s) is moved in flexion or extension  1+: Slight increase in muscle tone, manifested by a catch, followed by minimal resistance throughout the remainder (less than half) of the ROM  2: More marked increase in muscle tone through most of the ROM, but affected part(s) easily moved  3: Considerable increase in muscle tone, passive movement difficult  4: Affected part(s) rigid in flexion or extension    [place] Cognitive Assessment Version 8 3 (MoCA V8 3)  Visuospatial/executive functionin/5  Naming:  3/3  Memory: 1st trial: , 2nd trial:    Attention/concentration:   List of letters:    Serial Seven Subtraction:  1/3  Language/sentence repetition:   Language Fluency:    Abstract/Correlational Thinkin/2  Delayed Recall:   Orientation:               Memory Index Score: 15/15   MoCA V1 8 3 Raw Score: , MIS: 15/15, indicative of NO neurocognitive impairments (prior: , MIS:  15/15, indicative of mild neurocognitive impairments)     MOCA Scoring        Normal: 26+        Mild Cognitive Impairment: 18-25         Moderate Cognitive Impairment: 10-17        Severe Cognitive Impairment: <10        Trail making Part A and Part B:   Part A: 53 seconds with 1 error  (prior: 50 4 seconds)  Part B: 1:21 with 2 cues (prior: 1 minute 18 seconds with 1 VCs for recall of instructions)  Indicating deficits: Part A deficit > 39 14 seconds and Part B deficit > 91 32 seconds       CMT:  Immediate Recall:  (previously )  Delayed Recall:  (previously )    GOALS:   Short Term Goals:  Pt will demo improved R shoulder strength in order to score a 2+/5 on the shoulder flexion portion of MMTto increase independence with IADLs ACHIEVED  Pt will score lower then a 2/4 on the digit extensors portions of the Modified Levy Scale assessment to increase independence with IADLs  ACHIEVED  Pt will demo improved R UE function by scoring a 25/66 or high on the Fugl-Ritchie Assessment to increase independence with IADLs  ACHIEVED    Long Term Goals: 8-12 weeks  Pt will demo improved R shoulder strength in order to score a 3-/5 on the shoulder flexion portion of MMTto increase independence with IADLs  ACHIEVED  Pt will score lower then a 1+/4 on the digit extensors portions of the Modified Levy Scale assessment to increase independence with IADLs  PROGRESSED  Pt will demo improved R UE function by scoring a 26/66 or high on the Fugl-Ritchie Assessment to increase independence with IADLs  ACHIEVED  Pt will demo good carryover of clinic and home tone reduction strategies for improved AROM to increase independence donning/doffing jacket  PROGRESSED, FAIR CARRY OVER  Pt will demo good carryover of of Home Exercise Program to improve functional progression towards goals in R UE  PROGRESSED, FAIR CARRY OVER    Goals added 2/28/23  -Pt will demo improved sustained attention by completing Trail Making Part A in under 43 seconds  ACHIEVED  -Pt will demo improved divided attention by completing Trail Making Part B in under 1 minute with 0 VC for direction following  PROGRESSED  Pt will demo improved functional cognition by scoring over a 21/30 on the MoCA   ACHIEVED

## 2023-05-23 ENCOUNTER — APPOINTMENT (OUTPATIENT)
Dept: OCCUPATIONAL THERAPY | Facility: CLINIC | Age: 68
End: 2023-05-23
Payer: MEDICARE

## 2023-05-23 ENCOUNTER — OFFICE VISIT (OUTPATIENT)
Dept: PHYSICAL THERAPY | Facility: CLINIC | Age: 68
End: 2023-05-23

## 2023-05-23 DIAGNOSIS — M54.16 LUMBAR RADICULOPATHY: ICD-10-CM

## 2023-05-23 DIAGNOSIS — R53.1 WEAKNESS: ICD-10-CM

## 2023-05-23 DIAGNOSIS — I63.9 CEREBROVASCULAR ACCIDENT (CVA), UNSPECIFIED MECHANISM (HCC): Primary | ICD-10-CM

## 2023-05-23 NOTE — PROGRESS NOTES
"Daily Note     POC expires   5/10/23                   Visit/Unit Tracking  3/9                                              Today's date: 2023  Patient name: Fernando Rahman  : 1955  MRN: [id number]  Referring provider: Rowena Cummings MD  Dx:   Encounter Diagnosis     ICD-10-CM    1  Cerebrovascular accident (CVA), unspecified mechanism ([facility] [address] )  I63 9       2  Weakness  R53 1       3  Lumbar radiculopathy  M54 16                      Subjective: Patient reports to skilled outpatient PT with [facility] with no new changes, complaints, or falls  States he is feeling tired today      Objective: See treatment diary below    Vitals  HR: 63 BPM  SPO2: 98%      NMR:    - STS 2x20 w/ foam on chair and balance pod under L foot; 1 UE pushoff   - Treadmill w solostep:   - 8 minutes, @ 1 3 mph, 0% grade, 5# R ankle weight , RPE 13/20    HR  bpm  - LAT hurdles: 10x 12\" alistair  - FWD hurdles: 10x 12\" alistair    Assessment: Patient tolerated treatment well  Continued with treadmill training, reducing time to 8 minutes today with 5# ankle weight due to patient reported increase in fatigue pre-treatment  During hurdles, patient displays increased trunk extension and rotation to assist with foot clearance over 12\" alistair  He will continue to benefit from continued skilled PT to focus on mobility and gait deficits to improve function and safety at home and in his community  Plan: Continue per plan of care              Outcome Measures Initial Eval  2/10/2023 DN  2023 PN  3/14/2023 RE  23 PN  2023    5xSTS 18 73 sec  16 33 sec;   1 UE 11 6 sec, 1 UE  12 8 sec, 1 UE    TUG  sec  16 30 sec w/ quad cane    14 38 sec NO AD 14 0 sec w/ quad cane   12 0 sec NO AD  13 25 sec w/ quad cane  11 09 sec no AD    10 meter  m/s 0 51 m/s with quad cane 0 78 m/s w/ quad cane 0  81 m/s with quad cane 0 72 m/s w/ quad cane    ZAVALETA /56  38/56 46/56 51/56    6MWT  ft  700 ft w/ quad cane 700 ft w/ quad cane  704 ft w/ quad cane  " FGA   9/30 NO AD 11/30 NO AD  15/30

## 2023-05-25 ENCOUNTER — OFFICE VISIT (OUTPATIENT)
Dept: PHYSICAL THERAPY | Facility: CLINIC | Age: 68
End: 2023-05-25

## 2023-05-25 ENCOUNTER — APPOINTMENT (OUTPATIENT)
Dept: OCCUPATIONAL THERAPY | Facility: CLINIC | Age: 68
End: 2023-05-25
Payer: MEDICARE

## 2023-05-25 DIAGNOSIS — I63.9 CEREBROVASCULAR ACCIDENT (CVA), UNSPECIFIED MECHANISM (HCC): Primary | ICD-10-CM

## 2023-05-25 DIAGNOSIS — R53.1 WEAKNESS: ICD-10-CM

## 2023-05-25 DIAGNOSIS — M54.16 LUMBAR RADICULOPATHY: ICD-10-CM

## 2023-05-25 NOTE — PROGRESS NOTES
"Daily Note     POC expires   5/10/23                   Visit/Unit Tracking  3/9                                              Today's date: 2023  Patient name: Bailey Rosas  : 1955  MRN: [id number]  Referring provider: Aroldo Colon MD  Dx:   Encounter Diagnosis     ICD-10-CM    1  Cerebrovascular accident (CVA), unspecified mechanism ([facility] [address] )  I63 9       2  Weakness  R53 1       3  Lumbar radiculopathy  M54 16                      Subjective: Patient reports to skilled outpatient PT with [facility] and wife, reports no new changes, complaints, or falls      Objective: See treatment diary below    Vitals  HR: 62 BPM  SPO2: 98%  BP: 160/90    NMR:    - STS 2x20 w/ foam on chair and balance pod under L foot; 1 UE pushoff   - Treadmill:   - 8 minutes, @ 1 3 mph, 0% grade, 10# R ankle weight , RPE 13/20    HR  bpm  - Step ups: 20x 12\" step  - FWD hurdles: 10x 12\" alistair    HEP: walking, step taps, step ups, standing marches, standing hip extension, sit to stand      Assessment: Patient tolerated treatment well  Continued with treadmill training, completing 8 minutes with 10# ankle weight with 13/20 RPE  Step ups added today with RLE to increase RLE WBing when standing  Provided patient and spouse with HEP, reviewed all exercises, and discussed safety of exercising at home, patient verbalized agreement  He will continue to benefit from continued skilled PT to focus on mobility and gait deficits to improve function and safety at home and in his community  Plan: Continue per plan of care              Outcome Measures Initial Eval  2/10/2023 DN  2023 PN  3/14/2023 RE  23 PN  2023    5xSTS 18 73 sec  16 33 sec;   1 UE 11 6 sec, 1 UE  12 8 sec, 1 UE    TUG  sec  16 30 sec w/ quad cane    14 38 sec NO AD 14 0 sec w/ quad cane   12 0 sec NO AD  13 25 sec w/ quad cane  11 09 sec no AD    10 meter  m/s 0 51 m/s with quad cane 0 78 m/s w/ quad cane 0  81 m/s with quad cane 0 72 m/s w/ quad cane  " BERG /56  38/56 46/56 51/56    6MWT  ft  700 ft w/ quad cane 700 ft w/ quad cane  704 ft w/ quad cane    FGA   9/30 NO AD 11/30 NO AD  15/30

## 2023-05-30 ENCOUNTER — APPOINTMENT (OUTPATIENT)
Dept: OCCUPATIONAL THERAPY | Facility: CLINIC | Age: 68
End: 2023-05-30
Payer: MEDICARE

## 2023-05-30 ENCOUNTER — OFFICE VISIT (OUTPATIENT)
Dept: PHYSICAL THERAPY | Facility: CLINIC | Age: 68
End: 2023-05-30

## 2023-05-30 DIAGNOSIS — M54.16 LUMBAR RADICULOPATHY: ICD-10-CM

## 2023-05-30 DIAGNOSIS — I63.9 CEREBROVASCULAR ACCIDENT (CVA), UNSPECIFIED MECHANISM (HCC): Primary | ICD-10-CM

## 2023-05-30 DIAGNOSIS — R53.1 WEAKNESS: ICD-10-CM

## 2023-05-30 NOTE — PROGRESS NOTES
Daily/Discharge Note     POC expires   5/10/23                   Visit/Unit Tracking  3/9                                              Today's date: 2023  Patient name: Sandra Thomson  : 1955  MRN: [id number]  Referring provider: Alli Yeh MD  Dx:   Encounter Diagnosis     ICD-10-CM    1  Cerebrovascular accident (CVA), unspecified mechanism ([address] )  I63 9       2  Weakness  R53 1       3  Lumbar radiculopathy  M54 16                      Subjective: Patient reports to skilled outpatient PT with [facility], reports no new changes, complaints, or falls      Objective: See treatment diary below    Vitals  HR: 62 BPM  SPO2: 98%  BP: 158/88    NMR:    - STS 2x20 w/ foam on chair and balance pod under L foot; 1 UE pushoff   - FWD lunges on BOSU: 20x 2 sets  - LAT lunges on BOSU: 20x 2 sets  - Treadmill:   - 10 minutes, @ 1 2-1 6 mph, 0-10% grade, 10# R ankle weight , RPE 13/20    HR  bpm      HEP: walking, step taps, step ups, standing marches, standing hip extension, sit to stand      Assessment: Patient tolerated treatment well  Continued to challenge patient dynamic balance and incorporate increased WBing through LLE  BOSU added today to increase difficulty of unstable surface, with patient self-correcting with 1 UE support with LOB  Provided patient with additional information regarding gym program and offered to review HEP however patient had no questions about exercises  Based on his overall plateau in progress, patient will be D/C from skilled PT services         Goals  Short Term Goals (4 weeks):    - Patient will complete TUG test - MET  - patient will report reduction in low back pain by 50% - MET  - Patient will complete FGA test - MET  - Patient will complete 6 MWT - MET  - Patient will be independent in basic HEP 2-3 weeks - MET  - Patient will improve 5xSTS score by 2 3 seconds from 18 73 seconds to 15 seconds to promote improved LE functional strength needed for ADLs - MET  - Patient will complete components of CB&M to promote agility necessary for sports related tasks (d/c 3/14/2023)  - patient will complete 10 MWT - MET    Long Term Goals (12 weeks):  - Patient will be independent in a comprehensive home exercise program - PROGRESSING  - Patient will improve gait speed to 1 0 m/s with LRAD - PROGRESSING  - Patient will improve scoring on FGA to 23/30 or higher indicating lower risk for falls with dynamic tasks - PROGRESSING  - Patient will be able to demonstrate HT in gait without veering - PROGRESSING   - Patient will improve 6 Minute Walk Test score to  800 feet to promote improved cardiovascular endurance - PROGRESING   - Patient will report 50% reduction in near falls in order to improve safety with functional tasks and reduce his risk for falls - PROGRESSING   - Patient will report going on walks at least 3 days per week to promote independence and improved cardiovascular endurance - PROGRESSING   - Patient will be able to ascend/descend stairs reciprocally without UE assist to promote independence and safety with ADLs - PROGRESSING   - Patient will report 50% reduction in near falls when ambulating on uneven terrain - PROGRESSING   - Patient will report no low back pain radiating down leg/thigh - MET         Plan: Discharge           Outcome Measures Initial Eval  2/10/2023 DN  2/14/2023 PN  3/14/2023 RE  4/18/23 PN  5/18/2023    5xSTS 18 73 sec  16 33 sec;   1 UE 11 6 sec, 1 UE  12 8 sec, 1 UE    TUG  sec  16 30 sec w/ quad cane    14 38 sec NO AD 14 0 sec w/ quad cane   12 0 sec NO AD  13 25 sec w/ quad cane  11 09 sec no AD    10 meter  m/s 0 51 m/s with quad cane 0 78 m/s w/ quad cane 0  81 m/s with quad cane 0 72 m/s w/ quad cane    ZAVALETA /56  38/56 46/56 51/56    6MWT  ft  700 ft w/ quad cane 700 ft w/ quad cane  704 ft w/ quad cane    FGA   9/30 NO AD 11/30 NO AD  15/30